# Patient Record
Sex: FEMALE | Race: WHITE | NOT HISPANIC OR LATINO | Employment: OTHER
[De-identification: names, ages, dates, MRNs, and addresses within clinical notes are randomized per-mention and may not be internally consistent; named-entity substitution may affect disease eponyms.]

---

## 2017-05-05 DIAGNOSIS — G25.81 RESTLESS LEG SYNDROME: ICD-10-CM

## 2017-05-05 RX ORDER — ROPINIROLE 1 MG/1
TABLET, FILM COATED ORAL
Qty: 135 TABLET | Refills: 1 | Status: SHIPPED | OUTPATIENT
Start: 2017-05-05 | End: 2017-12-08

## 2017-05-05 NOTE — TELEPHONE ENCOUNTER
Patient request for 90 day supply.      DX: was for restless legs.  Per protocol can give up to 1 year.  No diagnosis for parkinsons.           Last Office Visit with G, P or [facility] prescribing provider: 10/28/16        BP Readings from Last 3 Encounters:   10/28/16 126/68   10/23/15 96/69   04/29/15 125/72     Medication filled for 6 months per protocol.      Ibis Peter RN, [facility]

## 2017-05-26 ENCOUNTER — HEALTH MAINTENANCE LETTER (OUTPATIENT)
Age: 57
End: 2017-05-26

## 2017-06-05 ENCOUNTER — TELEPHONE (OUTPATIENT)
Dept: PEDIATRICS | Facility: CLINIC | Age: 57
End: 2017-06-05

## 2017-06-05 NOTE — TELEPHONE ENCOUNTER
[facility] Call Center    Phone Message    Name of Caller: RAZIA VILLA    Phone Number: Cell number on file:    Telephone Information:   Mobile [phone]       Best time to return call: TODAY.     May a detailed message be left on voicemail: yes    Relation to patient: Self    Reason for Call: Other: Pt needs refill.  Uses it for restless legs (not Parkinsons).   rOPINIRole (REQUIP) 1 MG tablet.  Pt upset that nurse thought she had Parkinsons.     Action Taken: Message routed to:  Primary Care p [phone]

## 2017-06-05 NOTE — TELEPHONE ENCOUNTER
Per 5/5/17 refill encounter:  5/5/17 11:23 AM   Note      Patient request for 90 day supply.       DX: was for restless legs.  Per protocol can give up to 1 year.  No diagnosis for parkinsons.              Last Office Visit with [facility], P or [facility] prescribing provider: 10/28/16              BP Readings from Last 3 Encounters:   10/28/16 126/68   10/23/15 96/69   04/29/15 125/72      Medication filled for 6 months per protocol.       Ibis Peter RN, [facility]            Last fill:   rOPINIRole (REQUIP) 1 MG tablet 135 tablet 1 5/5/2017  No     Sig: TAKE ONE-HALF TABLET BY MOUTH DURING THE DAY AND 1 TABLET AT BEDTIME AS DIRECTED     Class: E-Prescribe     Notes to Pharmacy: **Patient requests 90 days supply**     Order: [id number]     E-Prescribing Status: Receipt confirmed by pharmacy (5/5/2017 11:36 AM CDT)       Printout Tracking      External Result Report       Pharmacy      [facility] [address] [place] [address]           Patient states she spoke to the pharmacist and they dont have the RX above.  Called [facility] pharmacy. They never received this RX.   Gave them verbal order of above RX Requip.  Called patient to notify her verbal order given to pharmacy.    Ibis Peter RN,   [facility]

## 2017-06-13 ENCOUNTER — TELEPHONE (OUTPATIENT)
Dept: PEDIATRICS | Facility: CLINIC | Age: 57
End: 2017-06-13

## 2017-06-13 DIAGNOSIS — Z11.1 SCREENING EXAMINATION FOR PULMONARY TUBERCULOSIS: Primary | ICD-10-CM

## 2017-06-13 DIAGNOSIS — Z11.1 SCREENING EXAMINATION FOR PULMONARY TUBERCULOSIS: ICD-10-CM

## 2017-06-13 PROCEDURE — 36415 COLL VENOUS BLD VENIPUNCTURE: CPT | Performed by: FAMILY MEDICINE

## 2017-06-13 PROCEDURE — 86480 TB TEST CELL IMMUN MEASURE: CPT | Performed by: FAMILY MEDICINE

## 2017-06-13 NOTE — TELEPHONE ENCOUNTER
[facility] Call Center    Phone Message    Name of Caller: DAMIAN VILLA    Phone Number: Cell number on file:    Telephone Information:   Mobile [phone]       Best time to return call: Today, when an order is placed for lab    May a detailed message be left on voicemail: yes    Relation to patient: Self    Reason for Call: Other: Pt states her employeer is asking for a blood mantoux test. They cannot accept the skin mantoux test. Please ask Dr Dyer for order and then call patient when she can come in. Thanks.      Action Taken: Message routed to:  Primary Care p [phone]

## 2017-06-13 NOTE — TELEPHONE ENCOUNTER
Routing encounter to PCP for review and lab order. Patient's employer is requesting a tuberculosis quantiferon. They do not accept mantoux placement.  Marie Kaur CMA

## 2017-06-13 NOTE — TELEPHONE ENCOUNTER
Notified patient that lab order has been placed. Patient scheduled a lab only appointment for today at 2:20 PM.  Marie Kaur CMA

## 2017-06-15 ENCOUNTER — TELEPHONE (OUTPATIENT)
Dept: PEDIATRICS | Facility: CLINIC | Age: 57
End: 2017-06-15

## 2017-06-15 LAB
M TB TUBERC IFN-G BLD QL: NEGATIVE
M TB TUBERC IFN-G/MITOGEN IGNF BLD: 0.17 IU/ML

## 2017-06-15 NOTE — LETTER
June 16, 2017      Zari Francisco  [address]  [place] [address]              Dear Zari,    Your Tuberculosis screen lab test was negative.    Component      Latest Ref Rng & Units 6/13/2017   M Tuberculosis Result      NEG Negative   M Tuberculosis Antigen Value      IU/mL 0.17         Sincerely,      Susanna Dyer MD

## 2017-06-15 NOTE — TELEPHONE ENCOUNTER
Notes Recorded by Susanna Dyer MD on 6/15/2017 at 1:47 PM  Please inform patient of negative TB screen test results

## 2017-06-16 NOTE — TELEPHONE ENCOUNTER
Patient returned call.  Gave her lab results.  She would like a copy of her lab results mailed to her.    Ibis Peter RN,   [facility], [facility]

## 2017-06-16 NOTE — TELEPHONE ENCOUNTER
2nd attempt:    Left message for patient to return call to clinic and ask to speak with nurse.    Ibis Peter RN,   [facility]

## 2017-12-08 DIAGNOSIS — G25.81 RESTLESS LEG SYNDROME: ICD-10-CM

## 2017-12-08 NOTE — LETTER
December 11, 2017      Zari Francisco  [address] [place] [address]              Dear Zari,      We recently received a call from your pharmacy requesting a refill of your medication. We have provided a 30 day refill of your medication, rOPINIRole (REQUIP) 1 MG tablet, as requested.      A review of your chart indicates that your last office visit was on 10/28/2016.  An appointment is required yearly with your provider.      We have authorized one time 30 day refill of your medication to allow time for you to schedule.     If you have a history of diabetes or high cholesterol, please come in fasting for the appointment. Fasting entails nothing to eat or drink 8 hours prior to your appointment; with the exception on water. You may take your medication the day of the appointment.    Please call the clinic to schedule your appointment.    Thank you for taking an active role in your healthcare.    Sincerely,       [facility]  Susanna Dyer MD

## 2017-12-11 RX ORDER — ROPINIROLE 1 MG/1
TABLET, FILM COATED ORAL
Qty: 45 TABLET | Refills: 0 | Status: SHIPPED | OUTPATIENT
Start: 2017-12-11 | End: 2018-01-02

## 2017-12-11 NOTE — TELEPHONE ENCOUNTER
rOPINIRole (REQUIP) 1 MG tablet  Last Written Prescription Date: 5/5/2017  Last Fill Quantity: 135, # refills: 1  Last Office Visit with [facility], [facility] or [facility] prescribing provider: 10/28/2016        BP Readings from Last 3 Encounters:   10/28/16 126/68   10/23/15 96/69   04/29/15 125/72     Lila refill. Letter sent to patient to schedule annual office visit. Jenny Wilks RN

## 2017-12-12 DIAGNOSIS — G25.81 RESTLESS LEG SYNDROME: ICD-10-CM

## 2017-12-13 RX ORDER — ROPINIROLE 1 MG/1
TABLET, FILM COATED ORAL
Qty: 135 TABLET | Refills: 0 | OUTPATIENT
Start: 2017-12-13

## 2018-01-02 ENCOUNTER — MYC MEDICAL ADVICE (OUTPATIENT)
Dept: PEDIATRICS | Facility: CLINIC | Age: 58
End: 2018-01-02

## 2018-01-02 DIAGNOSIS — G25.81 RESTLESS LEG SYNDROME: ICD-10-CM

## 2018-01-02 DIAGNOSIS — Z13.6 CARDIOVASCULAR SCREENING; LDL GOAL LESS THAN 160: Primary | ICD-10-CM

## 2018-01-02 RX ORDER — ROPINIROLE 1 MG/1
TABLET, FILM COATED ORAL
Qty: 45 TABLET | Refills: 0 | Status: SHIPPED | OUTPATIENT
Start: 2018-01-02 | End: 2018-02-09

## 2018-01-02 NOTE — TELEPHONE ENCOUNTER
**Second bernadette given to get patient through to appointment below**    rOPINIRole (REQUIP) 1 MG tablet  Last Written Prescription Date: 12/11/2017  Last Fill Quantity: 45, # refills: 0  Last Office Visit with FMSUKHWINDER, [facility] or [facility] prescribing provider: 10/28/2016   Next 5 appointments (look out 90 days)     Jan 16, 2018  7:50 AM CST   [facility] with Susanna Dyer MD   [facility] ([facility])    [address] [place] [address] [phone]                   BP Readings from Last 3 Encounters:   10/28/16 126/68   10/23/15 96/69   04/29/15 125/72       Jenny Wilks RN

## 2018-01-05 DIAGNOSIS — Z13.1 SCREENING FOR DIABETES MELLITUS (DM): ICD-10-CM

## 2018-01-05 DIAGNOSIS — G25.81 RESTLESS LEG SYNDROME: ICD-10-CM

## 2018-01-05 DIAGNOSIS — Z13.0 SCREENING FOR DEFICIENCY ANEMIA: ICD-10-CM

## 2018-01-05 DIAGNOSIS — Z13.6 CARDIOVASCULAR SCREENING; LDL GOAL LESS THAN 160: ICD-10-CM

## 2018-01-05 DIAGNOSIS — Z00.00 ROUTINE GENERAL MEDICAL EXAMINATION AT A HEALTH CARE FACILITY: Primary | ICD-10-CM

## 2018-01-11 ENCOUNTER — TELEPHONE (OUTPATIENT)
Dept: PEDIATRICS | Facility: CLINIC | Age: 58
End: 2018-01-11

## 2018-01-11 DIAGNOSIS — E55.9 VITAMIN D DEFICIENCY: ICD-10-CM

## 2018-01-11 DIAGNOSIS — G25.81 RESTLESS LEG SYNDROME: ICD-10-CM

## 2018-01-11 DIAGNOSIS — Z13.0 SCREENING FOR DEFICIENCY ANEMIA: ICD-10-CM

## 2018-01-11 DIAGNOSIS — Z00.00 ROUTINE GENERAL MEDICAL EXAMINATION AT A HEALTH CARE FACILITY: ICD-10-CM

## 2018-01-11 DIAGNOSIS — Z13.6 CARDIOVASCULAR SCREENING; LDL GOAL LESS THAN 160: ICD-10-CM

## 2018-01-11 DIAGNOSIS — E55.9 VITAMIN D DEFICIENCY: Primary | ICD-10-CM

## 2018-01-11 DIAGNOSIS — Z13.1 SCREENING FOR DIABETES MELLITUS (DM): ICD-10-CM

## 2018-01-11 LAB
ALBUMIN SERPL-MCNC: 4.1 G/DL (ref 3.4–5)
ALP SERPL-CCNC: 79 U/L (ref 40–150)
ALT SERPL W P-5'-P-CCNC: 19 U/L (ref 0–50)
ANION GAP SERPL CALCULATED.3IONS-SCNC: 7 MMOL/L (ref 3–14)
AST SERPL W P-5'-P-CCNC: 13 U/L (ref 0–45)
BASOPHILS # BLD AUTO: 0.1 10E9/L (ref 0–0.2)
BASOPHILS NFR BLD AUTO: 1 %
BILIRUB SERPL-MCNC: 0.4 MG/DL (ref 0.2–1.3)
BUN SERPL-MCNC: 16 MG/DL (ref 7–30)
CALCIUM SERPL-MCNC: 8.9 MG/DL (ref 8.5–10.1)
CHLORIDE SERPL-SCNC: 106 MMOL/L (ref 94–109)
CHOLEST SERPL-MCNC: 218 MG/DL
CO2 SERPL-SCNC: 27 MMOL/L (ref 20–32)
CREAT SERPL-MCNC: 0.74 MG/DL (ref 0.52–1.04)
DIFFERENTIAL METHOD BLD: NORMAL
EOSINOPHIL # BLD AUTO: 0.5 10E9/L (ref 0–0.7)
EOSINOPHIL NFR BLD AUTO: 6.8 %
ERYTHROCYTE [DISTWIDTH] IN BLOOD BY AUTOMATED COUNT: 11.2 % (ref 10–15)
GFR SERPL CREATININE-BSD FRML MDRD: 80 ML/MIN/1.7M2
GLUCOSE SERPL-MCNC: 88 MG/DL (ref 70–99)
HCT VFR BLD AUTO: 42 % (ref 35–47)
HDLC SERPL-MCNC: 78 MG/DL
HGB BLD-MCNC: 13.6 G/DL (ref 11.7–15.7)
IMM GRANULOCYTES # BLD: 0 10E9/L (ref 0–0.4)
IMM GRANULOCYTES NFR BLD: 0.3 %
LDLC SERPL CALC-MCNC: 127 MG/DL
LYMPHOCYTES # BLD AUTO: 2.1 10E9/L (ref 0.8–5.3)
LYMPHOCYTES NFR BLD AUTO: 30 %
MCH RBC QN AUTO: 29.3 PG (ref 26.5–33)
MCHC RBC AUTO-ENTMCNC: 32.4 G/DL (ref 31.5–36.5)
MCV RBC AUTO: 91 FL (ref 78–100)
MONOCYTES # BLD AUTO: 0.5 10E9/L (ref 0–1.3)
MONOCYTES NFR BLD AUTO: 7.1 %
NEUTROPHILS # BLD AUTO: 3.9 10E9/L (ref 1.6–8.3)
NEUTROPHILS NFR BLD AUTO: 54.8 %
NONHDLC SERPL-MCNC: 140 MG/DL
PLATELET # BLD AUTO: 272 10E9/L (ref 150–450)
POTASSIUM SERPL-SCNC: 3.9 MMOL/L (ref 3.4–5.3)
PROT SERPL-MCNC: 7.4 G/DL (ref 6.8–8.8)
RBC # BLD AUTO: 4.64 10E12/L (ref 3.8–5.2)
SODIUM SERPL-SCNC: 140 MMOL/L (ref 133–144)
TRIGL SERPL-MCNC: 65 MG/DL
WBC # BLD AUTO: 7.1 10E9/L (ref 4–11)

## 2018-01-11 PROCEDURE — 85025 COMPLETE CBC W/AUTO DIFF WBC: CPT | Performed by: FAMILY MEDICINE

## 2018-01-11 PROCEDURE — 80061 LIPID PANEL: CPT | Performed by: FAMILY MEDICINE

## 2018-01-11 PROCEDURE — 82306 VITAMIN D 25 HYDROXY: CPT | Performed by: FAMILY MEDICINE

## 2018-01-11 PROCEDURE — 80053 COMPREHEN METABOLIC PANEL: CPT | Performed by: FAMILY MEDICINE

## 2018-01-11 PROCEDURE — 36415 COLL VENOUS BLD VENIPUNCTURE: CPT | Performed by: FAMILY MEDICINE

## 2018-01-11 NOTE — TELEPHONE ENCOUNTER
----- Message from Anne Malave sent at 1/11/2018  8:05 AM CST -----  Regarding: lab  Patient is requesting a Vitamin D level check. Lab can add this onto the previous draw if ordered. Please order lab if appropriate. Thanks, Anne VALENCIA

## 2018-01-11 NOTE — TELEPHONE ENCOUNTER
Called lab and spoke with Mahogany. Mahogany states she will add on Vitamin D level as requested.  Marie Kaur, CMA

## 2018-01-12 LAB — DEPRECATED CALCIDIOL+CALCIFEROL SERPL-MC: 36 UG/L (ref 20–75)

## 2018-01-15 DIAGNOSIS — G25.81 RESTLESS LEG SYNDROME: ICD-10-CM

## 2018-01-16 RX ORDER — ROPINIROLE 1 MG/1
TABLET, FILM COATED ORAL
Qty: 45 TABLET | Refills: 0 | OUTPATIENT
Start: 2018-01-16

## 2018-01-16 NOTE — TELEPHONE ENCOUNTER
rOPINIRole (REQUIP) 1 MG tablet  Last Written Prescription Date:  1/2/2018  Last Fill Quantity: 45,  # refills: 0   Last Office Visit with [facility], P or [facility] prescribing provider:  10/28/2016   Future Office Visit:         Antiparkinson's Agents Protocol Failed1/15 5:40 PM   Blood pressure under 140/90    Recent or future visit with authorizing provider's specialty    Patient is age 18 or older    No active pregnancy on record    No positive pregnancy test in the past 12 months     Patient canceled her 1/16/2018 appointment due to illness. The above Rx was for a 3 month refill. Patient has not rescheduled. Refill request too soon. Jenny Wilks RN

## 2018-01-30 NOTE — PROGRESS NOTES
Normal vitamin D, fasting blood sugar, liver and kidney functions, hemoglobin and blood counts, stable fasting cholesterol numbers.  These are good and reassuring.  Susanna Dyer MD

## 2018-02-09 ENCOUNTER — MYC MEDICAL ADVICE (OUTPATIENT)
Dept: PEDIATRICS | Facility: CLINIC | Age: 58
End: 2018-02-09

## 2018-02-09 DIAGNOSIS — G25.81 RESTLESS LEG SYNDROME: ICD-10-CM

## 2018-02-09 RX ORDER — ROPINIROLE 1 MG/1
TABLET, FILM COATED ORAL
Qty: 8 TABLET | Refills: 0 | Status: SHIPPED | OUTPATIENT
Start: 2018-02-09 | End: 2018-02-12

## 2018-02-12 ENCOUNTER — OFFICE VISIT (OUTPATIENT)
Dept: PEDIATRICS | Facility: CLINIC | Age: 58
End: 2018-02-12
Payer: COMMERCIAL

## 2018-02-12 VITALS
OXYGEN SATURATION: 100 % | DIASTOLIC BLOOD PRESSURE: 70 MMHG | TEMPERATURE: 97.2 F | WEIGHT: 140.4 LBS | SYSTOLIC BLOOD PRESSURE: 136 MMHG | HEIGHT: 66 IN | BODY MASS INDEX: 22.56 KG/M2 | HEART RATE: 80 BPM

## 2018-02-12 DIAGNOSIS — Z12.39 BREAST CANCER SCREENING: ICD-10-CM

## 2018-02-12 DIAGNOSIS — Z00.00 ROUTINE GENERAL MEDICAL EXAMINATION AT A HEALTH CARE FACILITY: Primary | ICD-10-CM

## 2018-02-12 DIAGNOSIS — R91.8 PULMONARY NODULES: ICD-10-CM

## 2018-02-12 DIAGNOSIS — G25.81 RESTLESS LEG SYNDROME: ICD-10-CM

## 2018-02-12 DIAGNOSIS — N95.2 ATROPHIC VAGINITIS: ICD-10-CM

## 2018-02-12 DIAGNOSIS — F41.1 GENERALIZED ANXIETY DISORDER: ICD-10-CM

## 2018-02-12 DIAGNOSIS — G47.00 PERSISTENT DISORDER OF INITIATING OR MAINTAINING SLEEP: ICD-10-CM

## 2018-02-12 PROBLEM — Z72.0 OCCASIONAL TOBACCO SMOKER: Status: ACTIVE | Noted: 2018-02-12

## 2018-02-12 PROCEDURE — 99396 PREV VISIT EST AGE 40-64: CPT | Performed by: FAMILY MEDICINE

## 2018-02-12 RX ORDER — TRAZODONE HYDROCHLORIDE 50 MG/1
TABLET, FILM COATED ORAL
Qty: 135 TABLET | Refills: 3 | Status: SHIPPED | OUTPATIENT
Start: 2018-02-12 | End: 2020-05-13

## 2018-02-12 RX ORDER — ROPINIROLE 1 MG/1
TABLET, FILM COATED ORAL
Qty: 135 TABLET | Refills: 3 | Status: SHIPPED | OUTPATIENT
Start: 2018-02-12 | End: 2019-01-12

## 2018-02-12 RX ORDER — ROPINIROLE 1 MG/1
TABLET, FILM COATED ORAL
Qty: 8 TABLET | Refills: 0 | Status: SHIPPED | OUTPATIENT
Start: 2018-02-12 | End: 2018-02-12

## 2018-02-12 ASSESSMENT — PAIN SCALES - GENERAL: PAINLEVEL: NO PAIN (0)

## 2018-02-12 NOTE — MR AVS SNAPSHOT
After Visit Summary   2/12/2018    Zari Francisco    MRN: [id number]           Patient Information     Date Of Birth          1960        Visit Information        Provider Department      2/12/2018 2:50 PM Susanna Dyer MD [facility]        Today's Diagnoses     Routine general medical examination at a health care facility    -  1    Restless leg syndrome        Breast cancer screening        Persistent disorder of initiating or maintaining sleep        Atrophic vaginitis        Pulmonary nodules          Care Instructions    Schedule for chest CT and mammogram      Preventive Health Recommendations  Female Ages 50 - 64    Yearly exam: See your health care provider every year in order to  o Review health changes.   o Discuss preventive care.    o Review your medicines if your doctor has prescribed any.      Get a Pap test every three years (unless you have an abnormal result and your provider advises testing more often).    If you get Pap tests with HPV test, you only need to test every 5 years, unless you have an abnormal result.     You do not need a Pap test if your uterus was removed (hysterectomy) and you have not had cancer.    You should be tested each year for STDs (sexually transmitted diseases) if you're at risk.     Have a mammogram every 1 to 2 years.    Have a colonoscopy at age 50, or have a yearly FIT test (stool test). These exams screen for colon cancer.      Have a cholesterol test every 5 years, or more often if advised.    Have a diabetes test (fasting glucose) every three years. If you are at risk for diabetes, you should have this test more often.     If you are at risk for osteoporosis (brittle bone disease), think about having a bone density scan (DEXA).    Shots: Get a flu shot each year. Get a tetanus shot every 10 years.    Nutrition:     Eat at least 5 servings of fruits and vegetables each day.    Eat whole-grain bread, whole-wheat pasta and brown  rice instead of white grains and rice.    Talk to your provider about Calcium and Vitamin D.     Lifestyle    Exercise at least 150 minutes a week (30 minutes a day, 5 days a week). This will help you control your weight and prevent disease.    Limit alcohol to one drink per day.    No smoking.     Wear sunscreen to prevent skin cancer.     See your dentist every six months for an exam and cleaning.    See your eye doctor every 1 to 2 years.            Follow-ups after your visit        Future tests that were ordered for you today     Open Future Orders        Priority Expected Expires Ordered    CT Chest w/o Contrast Routine  2/12/2019 2/12/2018    MA Screening Digital Bilateral Routine  2/12/2019 2/12/2018            Who to contact     If you have questions or need follow up information about today's clinic visit or your schedule please contact [facility] directly at [phone].  Normal or non-critical lab and imaging results will be communicated to you by [employer], letter or phone within 4 business days after the clinic has received the results. If you do not hear from us within 7 days, please contact the clinic through [employer] or phone. If you have a critical or abnormal lab result, we will notify you by phone as soon as possible.  Submit refill requests through [facility] or call your pharmacy and they will forward the refill request to us. Please allow 3 business days for your refill to be completed.          Additional Information About Your Visit        [employer] Information     [facility] gives you secure access to your electronic health record. If you see a primary care provider, you can also send messages to your care team and make appointments. If you have questions, please call your primary care clinic.  If you do not have a primary care provider, please call [phone] and they will assist you.      [facility] is an electronic gateway that provides easy, online access to your medical records.  "With MyChart, you can request a clinic appointment, read your test results, renew a prescription or communicate with your care team.     To access your existing account, please contact your [facility] or call [phone] for assistance.        Care EveryWhere ID     This is your Care EveryWhere ID. This could be used by other organizations to access your [facility] medical records  [id number]        Your Vitals Were     Pulse Temperature Height Pulse Oximetry BMI (Body Mass Index)       80 97.2  F (36.2  C) (Oral) 5' 5.5\" (1.664 m) 100% 23.01 kg/m2        Blood Pressure from Last 3 Encounters:   02/12/18 136/70   10/28/16 126/68   10/23/15 96/69    Weight from Last 3 Encounters:   02/12/18 140 lb 6.4 oz (63.7 kg)   10/28/16 136 lb 12.8 oz (62.1 kg)   10/23/15 136 lb 14.4 oz (62.1 kg)                 Today's Medication Changes          These changes are accurate as of 2/12/18  3:20 PM.  If you have any questions, ask your nurse or doctor.               These medicines have changed or have updated prescriptions.        Dose/Directions    conjugated estrogens cream   Commonly known as:  PREMARIN   This may have changed:  See the new instructions.   Used for:  Atrophic vaginitis   Changed by:  Susanna Dyer MD        INSERT 0.5 GRAMS INTRAVAGINALLY TWICE WEEKLY AS DIRECTED   Quantity:  30 g   Refills:  3         Stop taking these medicines if you haven't already. Please contact your care team if you have questions.     estradiol 0.05 MG/24HR BIW patch   Commonly known as:  VIVELLE-DOT   Stopped by:  Susanna Dyer MD           mometasone 50 MCG/ACT spray   Commonly known as:  NASONEX   Stopped by:  Susanna Dyer MD                Where to get your medicines      These medications were sent to [facility] [address] [facility] [address] [place] [address]  [address] [address] [address]     Phone:  [phone]     conjugated estrogens cream    " rOPINIRole 1 MG tablet    traZODone 50 MG tablet                Primary Care Provider Office Phone # Fax #    Susanna Dyer [phone] [address] [facility] [address]        Equal Access to Services     CHEYENNELINDA SERA : Hadii aad ku hadtoroo Soomaali, waaxda luqadaha, qaybta kaalmada adeegyada, waxay idiin hayloryn adegoyo mercado kerline fiore. So [facility] [phone].    ATENCIÓN: Si habla español, tiene a holbrook disposición servicios gratuitos de asistencia lingüística. Llame al [phone].    We comply with applicable federal civil rights laws and [place] laws. We do not discriminate on the basis of race, color, national origin, age, disability, sex, sexual orientation, or gender identity.            Thank you!     Thank you for choosing [facility]  for your care. Our goal is always to provide you with excellent care. Hearing back from our patients is one way we can continue to improve our services. Please take a few minutes to complete the written survey that you may receive in the mail after your visit with us. Thank you!             Your Updated Medication List - Protect others around you: Learn how to safely use, store and throw away your medicines at www.disposemymeds.org.          This list is accurate as of 2/12/18  3:20 PM.  Always use your most recent med list.                   Brand Name Dispense Instructions for use Diagnosis    albuterol 108 (90 BASE) MCG/ACT Inhaler    VENTOLIN HFA    18 g    INHALE 2 PUFFS BY MOUTH INTO THE LUNGS EVERY 4 HOURS AS NEEDED FOR SHORTNESS OF BREATH/ DYSPNEA    Bronchospasm       conjugated estrogens cream    PREMARIN    30 g    INSERT 0.5 GRAMS INTRAVAGINALLY TWICE WEEKLY AS DIRECTED    Atrophic vaginitis       DULoxetine 30 MG EC capsule    CYMBALTA    360 capsule    Take 2 capsules (60 mg) by mouth 2 times daily    Menopausal syndrome (hot flashes), Generalized anxiety disorder       fluticasone 50 MCG/ACT spray    FLONASE    1  Package    Spray 2 sprays into both nostrils daily    Atrophic vaginitis       lamoTRIgine 25 MG tablet    LaMICtal     Take 1.5 tablets by mouth daily        polyethylene glycol powder    MIRALAX    510 g    Take 17 g by mouth daily.    Chronic constipation       rOPINIRole 1 MG tablet    REQUIP    8 tablet    TAKE 1/2 TABLET BY MOUTH DURING THE DAY AND 1 TABLET AT BEDTIME AS DIRECTED    Restless leg syndrome       traZODone 50 MG tablet    DESYREL    135 tablet    TAKE ONE AND ONE-HALF TABLETS BY MOUTH DAILY AS DIRECTED AS NEEDED FOR SLEEP    Persistent disorder of initiating or maintaining sleep

## 2018-02-12 NOTE — NURSING NOTE
"Chief Complaint   Patient presents with     Physical       Initial /70 (BP Location: Right arm, Patient Position: Sitting, Cuff Size: Adult Regular)  Pulse 80  Temp 97.2  F (36.2  C) (Oral)  Ht 5' 5.5\" (1.664 m)  Wt 140 lb 6.4 oz (63.7 kg)  SpO2 100%  BMI 23.01 kg/m2 Estimated body mass index is 23.01 kg/(m^2) as calculated from the following:    Height as of this encounter: 5' 5.5\" (1.664 m).    Weight as of this encounter: 140 lb 6.4 oz (63.7 kg).  Medication Reconciliation: complete  Nhung Avitia CMA    "
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
## 2018-02-12 NOTE — PATIENT INSTRUCTIONS
Schedule for chest CT and mammogram      Preventive Health Recommendations  Female Ages 50 - 64    Yearly exam: See your health care provider every year in order to  o Review health changes.   o Discuss preventive care.    o Review your medicines if your doctor has prescribed any.      Get a Pap test every three years (unless you have an abnormal result and your provider advises testing more often).    If you get Pap tests with HPV test, you only need to test every 5 years, unless you have an abnormal result.     You do not need a Pap test if your uterus was removed (hysterectomy) and you have not had cancer.    You should be tested each year for STDs (sexually transmitted diseases) if you're at risk.     Have a mammogram every 1 to 2 years.    Have a colonoscopy at age 50, or have a yearly FIT test (stool test). These exams screen for colon cancer.      Have a cholesterol test every 5 years, or more often if advised.    Have a diabetes test (fasting glucose) every three years. If you are at risk for diabetes, you should have this test more often.     If you are at risk for osteoporosis (brittle bone disease), think about having a bone density scan (DEXA).    Shots: Get a flu shot each year. Get a tetanus shot every 10 years.    Nutrition:     Eat at least 5 servings of fruits and vegetables each day.    Eat whole-grain bread, whole-wheat pasta and brown rice instead of white grains and rice.    Talk to your provider about Calcium and Vitamin D.     Lifestyle    Exercise at least 150 minutes a week (30 minutes a day, 5 days a week). This will help you control your weight and prevent disease.    Limit alcohol to one drink per day.    No smoking.     Wear sunscreen to prevent skin cancer.     See your dentist every six months for an exam and cleaning.    See your eye doctor every 1 to 2 years.

## 2018-02-19 ENCOUNTER — TELEPHONE (OUTPATIENT)
Dept: NURSING | Facility: CLINIC | Age: 58
End: 2018-02-19

## 2018-02-19 NOTE — TELEPHONE ENCOUNTER
Patient is calling about a medication she was started on by Isidros. She thought she was calling her PCP Dr Dyer at [place].     Patient states that for 3 days she had no issues with the adderall 10 mg BID that she was started on. She is taking one at 6 am then a second at 1:20 pm. She states that the second one is causing her to have the jitters and some shaking. Today she is noting a racing heart and shaking.    Patient is advised to be seen in Urgent Care for assessment of the symptoms. She is informed of where Urgent Care are located.     She is also informed to call and inform Nystroms of these symptoms.    Vanessa Cordero RN, [facility] Triage

## 2018-03-02 ENCOUNTER — RADIANT APPOINTMENT (OUTPATIENT)
Dept: MAMMOGRAPHY | Facility: CLINIC | Age: 58
End: 2018-03-02
Attending: FAMILY MEDICINE
Payer: COMMERCIAL

## 2018-03-02 ENCOUNTER — RADIANT APPOINTMENT (OUTPATIENT)
Dept: CT IMAGING | Facility: CLINIC | Age: 58
End: 2018-03-02
Attending: FAMILY MEDICINE
Payer: COMMERCIAL

## 2018-03-02 DIAGNOSIS — R91.8 PULMONARY NODULES: ICD-10-CM

## 2018-03-02 DIAGNOSIS — Z00.00 ROUTINE GENERAL MEDICAL EXAMINATION AT A HEALTH CARE FACILITY: ICD-10-CM

## 2018-03-02 DIAGNOSIS — Z12.39 BREAST CANCER SCREENING: ICD-10-CM

## 2018-03-02 PROCEDURE — 71250 CT THORAX DX C-: CPT | Performed by: RADIOLOGY

## 2018-03-02 PROCEDURE — 77067 SCR MAMMO BI INCL CAD: CPT

## 2018-03-02 PROCEDURE — 77063 BREAST TOMOSYNTHESIS BI: CPT

## 2018-03-05 NOTE — PROGRESS NOTES
Hi Maddie,  The CT showed stable and unchanged lung nodules form 3 years ago, this is reassuring.   Let me know if you have any questions. Take care.  Susanna Dyer MD

## 2018-10-22 ENCOUNTER — OFFICE VISIT (OUTPATIENT)
Dept: PEDIATRICS | Facility: CLINIC | Age: 58
End: 2018-10-22
Payer: COMMERCIAL

## 2018-10-22 VITALS
DIASTOLIC BLOOD PRESSURE: 80 MMHG | HEART RATE: 91 BPM | TEMPERATURE: 98 F | WEIGHT: 137 LBS | SYSTOLIC BLOOD PRESSURE: 126 MMHG | OXYGEN SATURATION: 95 % | BODY MASS INDEX: 22.45 KG/M2

## 2018-10-22 DIAGNOSIS — N95.2 ATROPHIC VAGINITIS: ICD-10-CM

## 2018-10-22 DIAGNOSIS — G25.81 RESTLESS LEG SYNDROME: Primary | ICD-10-CM

## 2018-10-22 DIAGNOSIS — N89.8 VAGINAL ITCHING: ICD-10-CM

## 2018-10-22 LAB
FERRITIN SERPL-MCNC: 29 NG/ML (ref 8–252)
MAGNESIUM SERPL-MCNC: 2.3 MG/DL (ref 1.6–2.3)
SPECIMEN SOURCE: NORMAL
TSH SERPL DL<=0.005 MIU/L-ACNC: 2.38 MU/L (ref 0.4–4)
WET PREP SPEC: NORMAL

## 2018-10-22 PROCEDURE — 36415 COLL VENOUS BLD VENIPUNCTURE: CPT | Performed by: FAMILY MEDICINE

## 2018-10-22 PROCEDURE — 99214 OFFICE O/P EST MOD 30 MIN: CPT | Performed by: FAMILY MEDICINE

## 2018-10-22 PROCEDURE — 82728 ASSAY OF FERRITIN: CPT | Performed by: FAMILY MEDICINE

## 2018-10-22 PROCEDURE — 87210 SMEAR WET MOUNT SALINE/INK: CPT | Performed by: FAMILY MEDICINE

## 2018-10-22 PROCEDURE — 83735 ASSAY OF MAGNESIUM: CPT | Performed by: FAMILY MEDICINE

## 2018-10-22 PROCEDURE — 84443 ASSAY THYROID STIM HORMONE: CPT | Performed by: FAMILY MEDICINE

## 2018-10-22 RX ORDER — GABAPENTIN 100 MG/1
100 CAPSULE ORAL AT BEDTIME
Qty: 90 CAPSULE | Refills: 3 | Status: SHIPPED | OUTPATIENT
Start: 2018-10-22 | End: 2019-05-06 | Stop reason: DRUGHIGH

## 2018-10-22 RX ORDER — DEXTROAMPHETAMINE SACCHARATE, AMPHETAMINE ASPARTATE, DEXTROAMPHETAMINE SULFATE AND AMPHETAMINE SULFATE 2.5; 2.5; 2.5; 2.5 MG/1; MG/1; MG/1; MG/1
10 TABLET ORAL DAILY
Refills: 0 | COMMUNITY
Start: 2018-10-08 | End: 2022-01-17

## 2018-10-22 NOTE — MR AVS SNAPSHOT
After Visit Summary   10/22/2018    Zari Francisco    MRN: [id number]           Patient Information     Date Of Birth          1960        Visit Information        Provider Department      10/22/2018 2:30 PM Susanna Dyer MD [facility]        Today's Diagnoses     Restless leg syndrome    -  1    Atrophic vaginitis        Vaginal itching          Care Instructions    Get the labs today  Start on GABAPENTIN 100mg at bedtime            Follow-ups after your visit        Who to contact     If you have questions or need follow up information about today's clinic visit or your schedule please contact [facility] directly at [phone].  Normal or non-critical lab and imaging results will be communicated to you by MyChart, letter or phone within 4 business days after the clinic has received the results. If you do not hear from us within 7 days, please contact the clinic through Extraprise[employer] or phone. If you have a critical or abnormal lab result, we will notify you by phone as soon as possible.  Submit refill requests through [employer] or call your pharmacy and they will forward the refill request to us. Please allow 3 business days for your refill to be completed.          Additional Information About Your Visit        MyChart Information     VertiFlex gives you secure access to your electronic health record. If you see a primary care provider, you can also send messages to your care team and make appointments. If you have questions, please call your primary care clinic.  If you do not have a primary care provider, please call [phone] and they will assist you.      VertiFlex is an electronic gateway that provides easy, online access to your medical records. With [employer], you can request a clinic appointment, read your test results, renew a prescription or communicate with your care team.     To access your existing account, please contact your [facility] or call [phone] for assistance.        Care EveryWhere ID     This is your Care EveryWhere ID. This could be used by other organizations to access your [facility] medical records  [id number]        Your Vitals Were     Pulse Temperature Pulse Oximetry BMI (Body Mass Index)          91 98  F (36.7  C) (Oral) 95% 22.45 kg/m2         Blood Pressure from Last 3 Encounters:   10/22/18 126/80   02/12/18 136/70   10/28/16 126/68    Weight from Last 3 Encounters:   10/22/18 137 lb (62.1 kg)   02/12/18 140 lb 6.4 oz (63.7 kg)   10/28/16 136 lb 12.8 oz (62.1 kg)              We Performed the Following     Ferritin     Magnesium     TSH with free T4 reflex     Wet prep          Today's Medication Changes          These changes are accurate as of 10/22/18  2:59 PM.  If you have any questions, ask your nurse or doctor.               Start taking these medicines.        Dose/Directions    gabapentin 100 MG capsule   Commonly known as:  NEURONTIN   Used for:  Restless leg syndrome   Started by:  Susanna Dyer MD        Dose:  100 mg   Take 1 capsule (100 mg) by mouth At Bedtime   Quantity:  90 capsule   Refills:  3         These medicines have changed or have updated prescriptions.        Dose/Directions    DULoxetine 30 MG EC capsule   Commonly known as:  CYMBALTA   This may have changed:    - how much to take  - how to take this  - when to take this  - additional instructions   Used for:  Menopausal syndrome (hot flashes), Generalized anxiety disorder        Dose:  60 mg   Take 2 capsules (60 mg) by mouth 2 times daily   Quantity:  360 capsule   Refills:  1            Where to get your medicines      These medications were sent to [facility] [address] [facility] [address] [address] [address]     Phone:  [phone]     conjugated estrogens cream    gabapentin 100 MG capsule                Primary Care Provider Office Phone # Fax #    Susanna HERMAN  MD Manisha [phone]       [address]  [facility] [address]        Equal Access to Services     BUFFY ZAMORA : Hadii aad ku hadtorolu Stover, mulufariha marinelliopalha, fernandosimona pittsluisa carr, hasmukh omer alexandergoyo mercado laRamykristina fiore. So [facility] [phone].    ATENCIÓN: Si habla español, tiene a holbrook disposición servicios gratuitos de asistencia lingüística. Llame al [phone].    We comply with applicable federal civil rights laws and [place] laws. We do not discriminate on the basis of race, color, national origin, age, disability, sex, sexual orientation, or gender identity.            Thank you!     Thank you for choosing [facility]  for your care. Our goal is always to provide you with excellent care. Hearing back from our patients is one way we can continue to improve our services. Please take a few minutes to complete the written survey that you may receive in the mail after your visit with us. Thank you!             Your Updated Medication List - Protect others around you: Learn how to safely use, store and throw away your medicines at www.disposemymeds.org.          This list is accurate as of 10/22/18  2:59 PM.  Always use your most recent med list.                   Brand Name Dispense Instructions for use Diagnosis    albuterol 108 (90 Base) MCG/ACT inhaler    VENTOLIN HFA    18 g    INHALE 2 PUFFS BY MOUTH INTO THE LUNGS EVERY 4 HOURS AS NEEDED FOR SHORTNESS OF BREATH/ DYSPNEA    Bronchospasm       amphetamine-dextroamphetamine 10 MG per tablet    ADDERALL     TK ONE AND ONE-HALF TS PO IN THE MORNING AND ONE-HALF T PO IN THE AFTERNOON DAILY.        conjugated estrogens cream    PREMARIN    30 g    INSERT 0.5 GRAMS INTRAVAGINALLY TWICE WEEKLY AS DIRECTED    Atrophic vaginitis       DULoxetine 30 MG EC capsule    CYMBALTA    360 capsule    Take 2 capsules (60 mg) by mouth 2 times daily    Menopausal syndrome (hot flashes), Generalized anxiety disorder       fluticasone  50 MCG/ACT spray    FLONASE    1 Package    Spray 2 sprays into both nostrils daily    Atrophic vaginitis       gabapentin 100 MG capsule    NEURONTIN    90 capsule    Take 1 capsule (100 mg) by mouth At Bedtime    Restless leg syndrome       lamoTRIgine 25 MG tablet    LaMICtal     Take 50 tablets by mouth daily        polyethylene glycol powder    MIRALAX    510 g    Take 17 g by mouth daily.    Chronic constipation       rOPINIRole 1 MG tablet    REQUIP    135 tablet    TAKE 1/2 TABLET BY MOUTH DURING THE DAY AND 1 TABLET AT BEDTIME AS DIRECTED    Restless leg syndrome       traZODone 50 MG tablet    DESYREL    135 tablet    TAKE ONE AND ONE-HALF TABLETS BY MOUTH DAILY AS DIRECTED AS NEEDED FOR SLEEP    Persistent disorder of initiating or maintaining sleep
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
## 2018-12-03 DIAGNOSIS — G25.81 RESTLESS LEG SYNDROME: ICD-10-CM

## 2018-12-04 RX ORDER — ROPINIROLE 1 MG/1
TABLET, FILM COATED ORAL
Qty: 135 TABLET | Refills: 0 | OUTPATIENT
Start: 2018-12-04

## 2018-12-04 NOTE — TELEPHONE ENCOUNTER
rOPINIRole (REQUIP) 1 MG tablet 135 tablet 3 2/12/2018  No   Sig: TAKE 1/2 TABLET BY MOUTH DURING THE DAY AND 1 TABLET AT BEDTIME AS DIRECTED       Antiparkinson's Agents Protocol [id number] 4:09 PM   Blood pressure under 140/90 in past 12 months    CBC on record in past 12 months    ALT on record in past 12 months     Serum Creatinine on file in past 12 months    Patient is age 18 or older    No active pregnancy on record    No positive pregnancy test in the past 12 months    Recent (6 mo) or future (30 days) visit within the authorizing provider's specialty     Pharmacy   [facility] [address] [place] [address]     Same requesting pharmacy - refill request too soon. Jenny Wilks RN

## 2019-01-12 DIAGNOSIS — G25.81 RESTLESS LEG SYNDROME: ICD-10-CM

## 2019-01-16 ENCOUNTER — MYC REFILL (OUTPATIENT)
Dept: PEDIATRICS | Facility: CLINIC | Age: 59
End: 2019-01-16

## 2019-01-16 DIAGNOSIS — G25.81 RESTLESS LEG SYNDROME: ICD-10-CM

## 2019-01-16 RX ORDER — ROPINIROLE 1 MG/1
TABLET, FILM COATED ORAL
Qty: 135 TABLET | Refills: 0 | Status: SHIPPED | OUTPATIENT
Start: 2019-01-16 | End: 2019-01-16

## 2019-01-16 NOTE — TELEPHONE ENCOUNTER
rOPINIRole (REQUIP) 1 MG tablet  Last Written Prescription Date:  2/12/18  Last Fill Quantity: 135,  # refills: 3   Last office visit: 10/22/2018 with prescribing provider:   Future Office Visit:      Routing refill request to provider for review/approval because:  Labs not current:  CBC, ALT, creatinine    Gayle Castillo RN   [facility]

## 2019-01-16 NOTE — TELEPHONE ENCOUNTER
Refill is sent.  Please inform patient to schedule for annual physical, mammogram and fasting labs.

## 2019-01-21 RX ORDER — ROPINIROLE 1 MG/1
TABLET, FILM COATED ORAL
Qty: 135 TABLET | Refills: 0 | Status: SHIPPED | OUTPATIENT
Start: 2019-01-21 | End: 2019-05-06

## 2019-01-21 NOTE — TELEPHONE ENCOUNTER
Labs out of date, last complted 1/2018. Patient has an upcoming appt with PCP. Lila refill given.     Zaira Bates RN

## 2019-02-11 DIAGNOSIS — Z13.6 CARDIOVASCULAR SCREENING; LDL GOAL LESS THAN 160: ICD-10-CM

## 2019-02-11 DIAGNOSIS — Z00.00 ROUTINE GENERAL MEDICAL EXAMINATION AT A HEALTH CARE FACILITY: Primary | ICD-10-CM

## 2019-02-11 DIAGNOSIS — Z13.1 SCREENING FOR DIABETES MELLITUS (DM): ICD-10-CM

## 2019-02-11 DIAGNOSIS — Z13.0 SCREENING FOR DEFICIENCY ANEMIA: ICD-10-CM

## 2019-04-13 ENCOUNTER — ANCILLARY PROCEDURE (OUTPATIENT)
Dept: GENERAL RADIOLOGY | Facility: CLINIC | Age: 59
End: 2019-04-13
Attending: FAMILY MEDICINE
Payer: COMMERCIAL

## 2019-04-13 ENCOUNTER — OFFICE VISIT (OUTPATIENT)
Dept: PEDIATRICS | Facility: CLINIC | Age: 59
End: 2019-04-13
Payer: COMMERCIAL

## 2019-04-13 VITALS
OXYGEN SATURATION: 100 % | TEMPERATURE: 97.8 F | WEIGHT: 137.3 LBS | HEIGHT: 66 IN | SYSTOLIC BLOOD PRESSURE: 112 MMHG | DIASTOLIC BLOOD PRESSURE: 70 MMHG | HEART RATE: 92 BPM | BODY MASS INDEX: 22.07 KG/M2

## 2019-04-13 DIAGNOSIS — M25.512 ACUTE PAIN OF LEFT SHOULDER: ICD-10-CM

## 2019-04-13 DIAGNOSIS — Z91.81 H/O FALL: ICD-10-CM

## 2019-04-13 DIAGNOSIS — M54.50 ACUTE LEFT-SIDED LOW BACK PAIN WITHOUT SCIATICA: ICD-10-CM

## 2019-04-13 DIAGNOSIS — M25.552 HIP PAIN, LEFT: ICD-10-CM

## 2019-04-13 DIAGNOSIS — M62.838 MUSCLE SPASMS OF NECK: ICD-10-CM

## 2019-04-13 DIAGNOSIS — Z91.81 H/O FALL: Primary | ICD-10-CM

## 2019-04-13 PROCEDURE — 72100 X-RAY EXAM L-S SPINE 2/3 VWS: CPT | Performed by: RADIOLOGY

## 2019-04-13 PROCEDURE — 73030 X-RAY EXAM OF SHOULDER: CPT | Mod: LT | Performed by: RADIOLOGY

## 2019-04-13 PROCEDURE — 73502 X-RAY EXAM HIP UNI 2-3 VIEWS: CPT | Mod: LT | Performed by: RADIOLOGY

## 2019-04-13 PROCEDURE — 99214 OFFICE O/P EST MOD 30 MIN: CPT | Performed by: FAMILY MEDICINE

## 2019-04-13 RX ORDER — DICLOFENAC SODIUM 75 MG/1
75 TABLET, DELAYED RELEASE ORAL 2 TIMES DAILY PRN
Qty: 30 TABLET | Refills: 0 | Status: SHIPPED | OUTPATIENT
Start: 2019-04-13 | End: 2021-11-09

## 2019-04-13 RX ORDER — DULOXETIN HYDROCHLORIDE 60 MG/1
120 CAPSULE, DELAYED RELEASE ORAL DAILY
COMMUNITY
End: 2022-01-17 | Stop reason: DRUGHIGH

## 2019-04-13 RX ORDER — DEXTROAMPHETAMINE SACCHARATE, AMPHETAMINE ASPARTATE, DEXTROAMPHETAMINE SULFATE AND AMPHETAMINE SULFATE 5; 5; 5; 5 MG/1; MG/1; MG/1; MG/1
20 TABLET ORAL 2 TIMES DAILY
COMMUNITY
End: 2021-10-25

## 2019-04-13 RX ORDER — LAMOTRIGINE 100 MG/1
200 TABLET ORAL DAILY
COMMUNITY
End: 2022-01-17 | Stop reason: DRUGHIGH

## 2019-04-13 ASSESSMENT — PAIN SCALES - GENERAL: PAINLEVEL: MODERATE PAIN (5)

## 2019-04-13 ASSESSMENT — MIFFLIN-ST. JEOR: SCORE: 1206.6

## 2019-04-13 NOTE — PATIENT INSTRUCTIONS
Get the xrays today    Take tizanidine as needed for muscle spasms of neck  Take diclofenac as needed for pain  Apply ice and heat locally as needed
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
## 2019-04-15 DIAGNOSIS — M54.50 ACUTE LEFT-SIDED LOW BACK PAIN WITHOUT SCIATICA: ICD-10-CM

## 2019-04-15 DIAGNOSIS — M62.838 MUSCLE SPASMS OF NECK: ICD-10-CM

## 2019-04-15 DIAGNOSIS — M25.512 ACUTE PAIN OF LEFT SHOULDER: ICD-10-CM

## 2019-04-15 DIAGNOSIS — Z91.81 H/O FALL: ICD-10-CM

## 2019-04-15 DIAGNOSIS — M25.552 HIP PAIN, LEFT: ICD-10-CM

## 2019-04-15 RX ORDER — DICLOFENAC SODIUM 75 MG/1
75 TABLET, DELAYED RELEASE ORAL 2 TIMES DAILY PRN
Qty: 30 TABLET | Refills: 0 | OUTPATIENT
Start: 2019-04-15

## 2019-04-15 NOTE — RESULT ENCOUNTER NOTE
Dear Maddie,  Your lower back lumbar x-ray showed no concern for fracture, this is reassuring.    Let us proceed with the treatment plan as discussed including medications and physical therapy.  If you do not feel any better in 4 weeks, make sure to follow-up or sooner if needed.   Let me know if you have any questions. Take care.  Susanna Dyer MD

## 2019-04-15 NOTE — TELEPHONE ENCOUNTER
Please inform pharmacy-these medications are not for long-term use  We will not refill for 90 days

## 2019-04-15 NOTE — RESULT ENCOUNTER NOTE
Dear Maddie,  Your  hip x-ray showed moderate arthritis of your left hip but no definite concern for fracture.  Let us proceed with the treatment plan as discussed including medications and physical therapy.  If you do not feel any better in 4 weeks, make sure to follow-up or sooner if needed.   Let me know if you have any questions. Take care.  Susanna Dyer MD

## 2019-04-15 NOTE — TELEPHONE ENCOUNTER
[facility] [place]    90 DAY PRESCRIPTION REQUEST FOR:    tiZANidine (ZANAFLEX) 4 MG tablet and diclofenac (VOLTAREN) 75 MG EC tablet    Cl[facility]    Message routed to refill pool

## 2019-04-18 ENCOUNTER — DOCUMENTATION ONLY (OUTPATIENT)
Dept: LAB | Facility: CLINIC | Age: 59
End: 2019-04-18

## 2019-04-18 DIAGNOSIS — Z11.1 SCREENING EXAMINATION FOR PULMONARY TUBERCULOSIS: Primary | ICD-10-CM

## 2019-04-18 NOTE — PROGRESS NOTES
Patient is requesting to have the QPlus TB test done for her employment.   Please order labs for 4-22 appointment. Thanks, Anne VALENCIA

## 2019-04-26 ENCOUNTER — TRANSFERRED RECORDS (OUTPATIENT)
Dept: HEALTH INFORMATION MANAGEMENT | Facility: CLINIC | Age: 59
End: 2019-04-26

## 2019-05-06 ENCOUNTER — OFFICE VISIT (OUTPATIENT)
Dept: PEDIATRICS | Facility: CLINIC | Age: 59
End: 2019-05-06
Payer: COMMERCIAL

## 2019-05-06 ENCOUNTER — RESULT FOLLOW UP (OUTPATIENT)
Dept: PEDIATRICS | Facility: CLINIC | Age: 59
End: 2019-05-06

## 2019-05-06 VITALS
DIASTOLIC BLOOD PRESSURE: 78 MMHG | BODY MASS INDEX: 22.02 KG/M2 | SYSTOLIC BLOOD PRESSURE: 112 MMHG | TEMPERATURE: 98.2 F | WEIGHT: 137 LBS | HEART RATE: 79 BPM | HEIGHT: 66 IN | OXYGEN SATURATION: 99 %

## 2019-05-06 DIAGNOSIS — Z00.00 ROUTINE GENERAL MEDICAL EXAMINATION AT A HEALTH CARE FACILITY: ICD-10-CM

## 2019-05-06 DIAGNOSIS — N95.2 ATROPHIC VAGINITIS: ICD-10-CM

## 2019-05-06 DIAGNOSIS — F41.1 GENERALIZED ANXIETY DISORDER: ICD-10-CM

## 2019-05-06 DIAGNOSIS — G25.81 RESTLESS LEGS SYNDROME (RLS): Primary | ICD-10-CM

## 2019-05-06 DIAGNOSIS — Z23 NEED FOR SHINGLES VACCINE: ICD-10-CM

## 2019-05-06 DIAGNOSIS — K59.09 CHRONIC CONSTIPATION: ICD-10-CM

## 2019-05-06 DIAGNOSIS — R03.0 ELEVATED BP WITHOUT DIAGNOSIS OF HYPERTENSION: ICD-10-CM

## 2019-05-06 DIAGNOSIS — Z11.4 SCREENING FOR HUMAN IMMUNODEFICIENCY VIRUS: ICD-10-CM

## 2019-05-06 DIAGNOSIS — R91.8 PULMONARY NODULES: ICD-10-CM

## 2019-05-06 DIAGNOSIS — Z13.0 SCREENING FOR DEFICIENCY ANEMIA: ICD-10-CM

## 2019-05-06 DIAGNOSIS — Z00.00 ROUTINE GENERAL MEDICAL EXAMINATION AT A HEALTH CARE FACILITY: Primary | ICD-10-CM

## 2019-05-06 DIAGNOSIS — R73.01 ELEVATED FASTING GLUCOSE: ICD-10-CM

## 2019-05-06 DIAGNOSIS — Z11.1 SCREENING EXAMINATION FOR PULMONARY TUBERCULOSIS: ICD-10-CM

## 2019-05-06 DIAGNOSIS — Z12.39 BREAST CANCER SCREENING: ICD-10-CM

## 2019-05-06 DIAGNOSIS — Z13.1 SCREENING FOR DIABETES MELLITUS (DM): ICD-10-CM

## 2019-05-06 DIAGNOSIS — Z13.6 CARDIOVASCULAR SCREENING; LDL GOAL LESS THAN 160: ICD-10-CM

## 2019-05-06 DIAGNOSIS — Z12.4 SCREENING FOR CERVICAL CANCER: ICD-10-CM

## 2019-05-06 DIAGNOSIS — G25.81 RESTLESS LEG SYNDROME: ICD-10-CM

## 2019-05-06 LAB
ALBUMIN SERPL-MCNC: 4.1 G/DL (ref 3.4–5)
ALP SERPL-CCNC: 97 U/L (ref 40–150)
ALT SERPL W P-5'-P-CCNC: 32 U/L (ref 0–50)
ANION GAP SERPL CALCULATED.3IONS-SCNC: 5 MMOL/L (ref 3–14)
AST SERPL W P-5'-P-CCNC: 22 U/L (ref 0–45)
BASOPHILS # BLD AUTO: 0.1 10E9/L (ref 0–0.2)
BASOPHILS NFR BLD AUTO: 1 %
BILIRUB SERPL-MCNC: 0.5 MG/DL (ref 0.2–1.3)
BUN SERPL-MCNC: 18 MG/DL (ref 7–30)
CALCIUM SERPL-MCNC: 9.2 MG/DL (ref 8.5–10.1)
CHLORIDE SERPL-SCNC: 106 MMOL/L (ref 94–109)
CHOLEST SERPL-MCNC: 222 MG/DL
CO2 SERPL-SCNC: 28 MMOL/L (ref 20–32)
CREAT SERPL-MCNC: 0.71 MG/DL (ref 0.52–1.04)
DIFFERENTIAL METHOD BLD: NORMAL
EOSINOPHIL # BLD AUTO: 0.5 10E9/L (ref 0–0.7)
EOSINOPHIL NFR BLD AUTO: 6.4 %
ERYTHROCYTE [DISTWIDTH] IN BLOOD BY AUTOMATED COUNT: 11.4 % (ref 10–15)
GFR SERPL CREATININE-BSD FRML MDRD: >90 ML/MIN/{1.73_M2}
GLUCOSE SERPL-MCNC: 95 MG/DL (ref 70–99)
HCT VFR BLD AUTO: 41.9 % (ref 35–47)
HDLC SERPL-MCNC: 78 MG/DL
HGB BLD-MCNC: 13.8 G/DL (ref 11.7–15.7)
IMM GRANULOCYTES # BLD: 0 10E9/L (ref 0–0.4)
IMM GRANULOCYTES NFR BLD: 0.4 %
LDLC SERPL CALC-MCNC: 128 MG/DL
LYMPHOCYTES # BLD AUTO: 2.3 10E9/L (ref 0.8–5.3)
LYMPHOCYTES NFR BLD AUTO: 28.2 %
MCH RBC QN AUTO: 29.6 PG (ref 26.5–33)
MCHC RBC AUTO-ENTMCNC: 32.9 G/DL (ref 31.5–36.5)
MCV RBC AUTO: 90 FL (ref 78–100)
MONOCYTES # BLD AUTO: 0.6 10E9/L (ref 0–1.3)
MONOCYTES NFR BLD AUTO: 7 %
NEUTROPHILS # BLD AUTO: 4.6 10E9/L (ref 1.6–8.3)
NEUTROPHILS NFR BLD AUTO: 57 %
NONHDLC SERPL-MCNC: 144 MG/DL
PLATELET # BLD AUTO: 283 10E9/L (ref 150–450)
POTASSIUM SERPL-SCNC: 3.9 MMOL/L (ref 3.4–5.3)
PROT SERPL-MCNC: 7.4 G/DL (ref 6.8–8.8)
RBC # BLD AUTO: 4.66 10E12/L (ref 3.8–5.2)
SODIUM SERPL-SCNC: 139 MMOL/L (ref 133–144)
TRIGL SERPL-MCNC: 82 MG/DL
WBC # BLD AUTO: 8 10E9/L (ref 4–11)

## 2019-05-06 PROCEDURE — 99213 OFFICE O/P EST LOW 20 MIN: CPT | Mod: 25 | Performed by: FAMILY MEDICINE

## 2019-05-06 PROCEDURE — 99396 PREV VISIT EST AGE 40-64: CPT | Performed by: FAMILY MEDICINE

## 2019-05-06 PROCEDURE — G0145 SCR C/V CYTO,THINLAYER,RESCR: HCPCS | Performed by: FAMILY MEDICINE

## 2019-05-06 PROCEDURE — 85025 COMPLETE CBC W/AUTO DIFF WBC: CPT | Performed by: FAMILY MEDICINE

## 2019-05-06 PROCEDURE — 87624 HPV HI-RISK TYP POOLED RSLT: CPT | Performed by: FAMILY MEDICINE

## 2019-05-06 PROCEDURE — 86481 TB AG RESPONSE T-CELL SUSP: CPT | Performed by: FAMILY MEDICINE

## 2019-05-06 PROCEDURE — 80053 COMPREHEN METABOLIC PANEL: CPT | Performed by: FAMILY MEDICINE

## 2019-05-06 PROCEDURE — 36415 COLL VENOUS BLD VENIPUNCTURE: CPT | Performed by: FAMILY MEDICINE

## 2019-05-06 PROCEDURE — 80061 LIPID PANEL: CPT | Performed by: FAMILY MEDICINE

## 2019-05-06 RX ORDER — ROPINIROLE 1 MG/1
TABLET, FILM COATED ORAL
Qty: 135 TABLET | Refills: 3 | Status: SHIPPED | OUTPATIENT
Start: 2019-05-06 | End: 2020-05-13

## 2019-05-06 RX ORDER — GABAPENTIN 300 MG/1
300 CAPSULE ORAL AT BEDTIME
Qty: 90 CAPSULE | Refills: 3 | Status: SHIPPED | OUTPATIENT
Start: 2019-05-06 | End: 2019-05-14 | Stop reason: DRUGHIGH

## 2019-05-06 ASSESSMENT — MIFFLIN-ST. JEOR: SCORE: 1205.24

## 2019-05-06 ASSESSMENT — PAIN SCALES - GENERAL: PAINLEVEL: MODERATE PAIN (4)

## 2019-05-06 NOTE — PATIENT INSTRUCTIONS
Schedule for mammogram  Increase GABAPENTIN TO 300MG AT BEDTIME  Discuss with your psychiatrist on stopping CYMBALTA  Start taking vitamin D 2000 units daily      Preventive Health Recommendations  Female Ages 50 - 64    Yearly exam: See your health care provider every year in order to  o Review health changes.   o Discuss preventive care.    o Review your medicines if your doctor has prescribed any.      Get a Pap test every three years (unless you have an abnormal result and your provider advises testing more often).    If you get Pap tests with HPV test, you only need to test every 5 years, unless you have an abnormal result.     You do not need a Pap test if your uterus was removed (hysterectomy) and you have not had cancer.    You should be tested each year for STDs (sexually transmitted diseases) if you're at risk.     Have a mammogram every 1 to 2 years.    Have a colonoscopy at age 50, or have a yearly FIT test (stool test). These exams screen for colon cancer.      Have a cholesterol test every 5 years, or more often if advised.    Have a diabetes test (fasting glucose) every three years. If you are at risk for diabetes, you should have this test more often.     If you are at risk for osteoporosis (brittle bone disease), think about having a bone density scan (DEXA).    Shots: Get a flu shot each year. Get a tetanus shot every 10 years.    Nutrition:     Eat at least 5 servings of fruits and vegetables each day.    Eat whole-grain bread, whole-wheat pasta and brown rice instead of white grains and rice.    Get adequate Calcium and Vitamin D.     Lifestyle    Exercise at least 150 minutes a week (30 minutes a day, 5 days a week). This will help you control your weight and prevent disease.    Limit alcohol to one drink per day.    No smoking.     Wear sunscreen to prevent skin cancer.     See your dentist every six months for an exam and cleaning.    See your eye doctor every 1 to 2 years.
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
## 2019-05-08 LAB
COPATH REPORT: NORMAL
GAMMA INTERFERON BACKGROUND BLD IA-ACNC: 0.05 IU/ML
M TB IFN-G BLD-IMP: NEGATIVE
M TB IFN-G CD4+ BCKGRND COR BLD-ACNC: >10 IU/ML
MITOGEN IGNF BCKGRD COR BLD-ACNC: 0 IU/ML
MITOGEN IGNF BCKGRD COR BLD-ACNC: 0 IU/ML
PAP: NORMAL

## 2019-05-08 NOTE — RESULT ENCOUNTER NOTE
Dear Maddie,  Your test results for TB screening is negative, this is reassuring.  Let me know if you have any questions. Take care.  Susanna Dyer MD

## 2019-05-09 PROBLEM — R87.810 CERVICAL HIGH RISK HPV (HUMAN PAPILLOMAVIRUS) TEST POSITIVE: Status: ACTIVE | Noted: 2019-05-06

## 2019-05-09 LAB
FINAL DIAGNOSIS: ABNORMAL
HPV HR 12 DNA CVX QL NAA+PROBE: POSITIVE
HPV16 DNA SPEC QL NAA+PROBE: NEGATIVE
HPV18 DNA SPEC QL NAA+PROBE: NEGATIVE
SPECIMEN DESCRIPTION: ABNORMAL
SPECIMEN SOURCE CVX/VAG CYTO: ABNORMAL

## 2019-05-10 NOTE — PROGRESS NOTES
2006, 2007, 2008, 2009 NIL paps.  2010 NIL pap, Neg HPV.  2016 NIL pap, Neg HPV.  5/6/19 NIL pap, + HR HPV (not 16 or 18). Plan cotest in 1 year. (MRA)  5/13/19 pt notified by phone.  5/13/20 NIL Pap, Neg HPV. Plan: Cotest in 3 years.

## 2019-05-28 ENCOUNTER — TRANSFERRED RECORDS (OUTPATIENT)
Dept: HEALTH INFORMATION MANAGEMENT | Facility: CLINIC | Age: 59
End: 2019-05-28

## 2019-05-31 ENCOUNTER — MYC MEDICAL ADVICE (OUTPATIENT)
Dept: PEDIATRICS | Facility: CLINIC | Age: 59
End: 2019-05-31

## 2019-05-31 ENCOUNTER — TRANSFERRED RECORDS (OUTPATIENT)
Dept: HEALTH INFORMATION MANAGEMENT | Facility: CLINIC | Age: 59
End: 2019-05-31

## 2019-08-13 DIAGNOSIS — N95.2 ATROPHIC VAGINITIS: ICD-10-CM

## 2019-08-13 RX ORDER — CONJUGATED ESTROGENS 0.62 MG/G
CREAM VAGINAL
Qty: 30 G | Refills: 2 | Status: SHIPPED | OUTPATIENT
Start: 2019-08-13 | End: 2020-05-13

## 2019-08-23 NOTE — RESULT ENCOUNTER NOTE
Dear Maddie,  Your left shoulder xray showed no concern for fracture but it did show concerns for rotator cuff tendinitis.  Let us proceed with the treatment plan as discussed including medications and physical therapy.  If you do not feel any better in 4 weeks, make sure to follow-up or sooner if needed.   Let me know if you have any questions. Take care.  Susanna Dyer MD     Initial (On Arrival)

## 2019-08-30 ENCOUNTER — TRANSFERRED RECORDS (OUTPATIENT)
Dept: HEALTH INFORMATION MANAGEMENT | Facility: CLINIC | Age: 59
End: 2019-08-30

## 2019-10-28 ENCOUNTER — TELEPHONE (OUTPATIENT)
Dept: PEDIATRICS | Facility: CLINIC | Age: 59
End: 2019-10-28

## 2019-10-28 NOTE — TELEPHONE ENCOUNTER
Patient calls stating she has chills, headache and like her head is foggy after getting the flu shot this morning.   She denies dizziness, heart beating faster or any other symptoms.  She doesn't have a thermometer.  Informed that her symptoms are likely a normal response and her body is making antibodies to the vaccine. She will monitor and go to  if she worsens or will call back if she doesn't improve. Patient verbalized understanding.    Lorena Granda RN

## 2019-11-08 ENCOUNTER — TRANSFERRED RECORDS (OUTPATIENT)
Dept: HEALTH INFORMATION MANAGEMENT | Facility: CLINIC | Age: 59
End: 2019-11-08

## 2019-12-08 ENCOUNTER — HEALTH MAINTENANCE LETTER (OUTPATIENT)
Age: 59
End: 2019-12-08

## 2020-03-15 ENCOUNTER — HEALTH MAINTENANCE LETTER (OUTPATIENT)
Age: 60
End: 2020-03-15

## 2020-04-09 ENCOUNTER — TELEPHONE (OUTPATIENT)
Dept: PEDIATRICS | Facility: CLINIC | Age: 60
End: 2020-04-09

## 2020-04-09 NOTE — TELEPHONE ENCOUNTER
Message routed to provider to review and advise if ok for testing and in office visit.Ericka PENA,CMA

## 2020-04-09 NOTE — TELEPHONE ENCOUNTER
[facility] Call Center    Phone Message    May a detailed message be left on voicemail: yes     Reason for Call: Other: Patient called and would like call back to discuss scheduling her HPV check up. Patient said it is important she get tested and would not like to wait until July.      Action Taken: Message routed to:  Primary Care p [phone]

## 2020-04-10 NOTE — TELEPHONE ENCOUNTER
Called pt gave provider message to pt,call was dropped and lost connection. Pt to call back and she can schedule for physical/PAP after 5/6/2020.            CALL CENTER     If pt calls back please schedule for physical and PAP. Thank you    Ericka PENA CMA

## 2020-04-10 NOTE — TELEPHONE ENCOUNTER
Reviewed chart.  She is not due for pap/HPV until 5/6/2020.  Let us see her on or after 5/6/2020 for repeat PAP and physical

## 2020-04-13 ENCOUNTER — TELEPHONE (OUTPATIENT)
Dept: PEDIATRICS | Facility: CLINIC | Age: 60
End: 2020-04-13

## 2020-04-13 DIAGNOSIS — G25.81 RESTLESS LEG SYNDROME: ICD-10-CM

## 2020-04-13 RX ORDER — GABAPENTIN 100 MG/1
300 CAPSULE ORAL AT BEDTIME
Qty: 210 CAPSULE | Refills: 0 | Status: SHIPPED | OUTPATIENT
Start: 2020-04-13 | End: 2020-04-20

## 2020-04-13 NOTE — TELEPHONE ENCOUNTER
[facility]    Phone Message    May a detailed message be left on voicemail: no     Reason for Call: Medication Question or concern regarding medication   Prescription Clarification  Name of Medication: Gabapentin  Prescribing Provider: Manisha   Pharmacy:      [id number] [address] [facility] [address]     What on the order needs clarification Pt increased her medication on her own.     Was on 300mg and reduced to 200mg.  She is having break through symptoms with her restless legs and went back to 300mg.       Action Taken: Message routed to:  Primary Care p [id number]    Travel Screening:

## 2020-04-13 NOTE — TELEPHONE ENCOUNTER
message routed to provider to review and advise for ok to increase for break through pain her gabapentin from 200mg to 300 mg.Ericka PENA,CMA

## 2020-04-20 ENCOUNTER — TELEPHONE (OUTPATIENT)
Dept: PEDIATRICS | Facility: CLINIC | Age: 60
End: 2020-04-20

## 2020-04-20 RX ORDER — GABAPENTIN 100 MG/1
300 CAPSULE ORAL AT BEDTIME
Qty: 210 CAPSULE | Refills: 0 | Status: SHIPPED | OUTPATIENT
Start: 2020-04-20 | End: 2020-05-13 | Stop reason: DRUGHIGH

## 2020-04-20 NOTE — TELEPHONE ENCOUNTER
[facility] Call [facility]    Phone Message    May a detailed message be left on voicemail: yes     Reason for Call: Medication Question or concern regarding medication   Prescription Clarification  Name of Medication: gabapentin (NEURONTIN) 100 MG capsule [6545] (Order [id number])     Prescribing Provider:    Pharmacy: [facility]   What on the order needs clarification? Pharmacy called, per [facility] conversation please call back to for early refill request.           Action Taken: Message routed to:  Primary Care p [id number]    Travel Screening: Not Applicable

## 2020-04-20 NOTE — TELEPHONE ENCOUNTER
Called [facility] they will fill today and inform patient when ready.    Called patient and left message with information that there is a refill that was sent and to call [facility] before picking up (if they haven't called)    Nay Carolina RN, [facility]

## 2020-04-20 NOTE — TELEPHONE ENCOUNTER
There are 2 encounters going for this medication. Will close this encounter.    Nay Carolina RN, [facility]

## 2020-04-20 NOTE — TELEPHONE ENCOUNTER
Yuir is requesting a call to have an early refill of Gabapentin for the patient.  Please advise.  Thank you.

## 2020-04-20 NOTE — TELEPHONE ENCOUNTER
Patient called and states pharmacy will not allow refill for gabapentin until May 4th, said it is too early. Patient needs Dr Dyer to call pharmacy and ok patient  today. Please call to discuss.

## 2020-04-27 ENCOUNTER — TRANSFERRED RECORDS (OUTPATIENT)
Dept: HEALTH INFORMATION MANAGEMENT | Facility: CLINIC | Age: 60
End: 2020-04-27

## 2020-04-28 ENCOUNTER — TELEPHONE (OUTPATIENT)
Dept: PEDIATRICS | Facility: CLINIC | Age: 60
End: 2020-04-28

## 2020-04-28 NOTE — TELEPHONE ENCOUNTER
I was told earlier that patient was anxious not to postpone the visit.   Ok to keep it as scheduled in May

## 2020-04-28 NOTE — TELEPHONE ENCOUNTER
Called patient to reschedule future physical with fasting lab appointments to July or after. Patient states she was diagnosed with HPV and requires repeat testing within 1 year and would prefer to complete in person visit to repeat testing before July. Rescheduled patient visit and routing to Dr. Dyer to confirm need for in person testing before July as specified by patient.    Future Office Visit:   Next 5 appointments (look out 90 days)    May 13, 2020  8:10 AM CDT  Return Visit with Susanna Dyer MD  [facility] [address] [place] [id number] [phone]         Nhung WHITFIELD CMA

## 2020-05-08 ENCOUNTER — TRANSFERRED RECORDS (OUTPATIENT)
Dept: HEALTH INFORMATION MANAGEMENT | Facility: CLINIC | Age: 60
End: 2020-05-08

## 2020-05-12 ENCOUNTER — TELEPHONE (OUTPATIENT)
Dept: PEDIATRICS | Facility: CLINIC | Age: 60
End: 2020-05-12

## 2020-05-12 DIAGNOSIS — Z00.00 ROUTINE GENERAL MEDICAL EXAMINATION AT A HEALTH CARE FACILITY: Primary | ICD-10-CM

## 2020-05-12 DIAGNOSIS — Z13.6 CARDIOVASCULAR SCREENING; LDL GOAL LESS THAN 160: ICD-10-CM

## 2020-05-12 DIAGNOSIS — Z13.0 SCREENING FOR DEFICIENCY ANEMIA: ICD-10-CM

## 2020-05-12 DIAGNOSIS — Z13.1 SCREENING FOR DIABETES MELLITUS (DM): ICD-10-CM

## 2020-05-12 NOTE — TELEPHONE ENCOUNTER
Patient not currently taking cholesterol lowering medication, [facility] fasting labs every 5 years.  Forwarding to provider for review/signature if appropriate, does not meet standing lab order criteria.    Kandy Elizabeth RN

## 2020-05-13 ENCOUNTER — OFFICE VISIT (OUTPATIENT)
Dept: PEDIATRICS | Facility: CLINIC | Age: 60
End: 2020-05-13
Payer: COMMERCIAL

## 2020-05-13 VITALS
HEIGHT: 66 IN | WEIGHT: 135 LBS | TEMPERATURE: 97.7 F | SYSTOLIC BLOOD PRESSURE: 128 MMHG | DIASTOLIC BLOOD PRESSURE: 87 MMHG | BODY MASS INDEX: 21.69 KG/M2 | HEART RATE: 88 BPM | OXYGEN SATURATION: 96 %

## 2020-05-13 DIAGNOSIS — Z13.6 CARDIOVASCULAR SCREENING; LDL GOAL LESS THAN 160: ICD-10-CM

## 2020-05-13 DIAGNOSIS — Z90.711 S/P ABDOMINAL SUPRACERVICAL SUBTOTAL HYSTERECTOMY: ICD-10-CM

## 2020-05-13 DIAGNOSIS — G47.00 PERSISTENT DISORDER OF INITIATING OR MAINTAINING SLEEP: ICD-10-CM

## 2020-05-13 DIAGNOSIS — Z12.4 CERVICAL CANCER SCREENING: ICD-10-CM

## 2020-05-13 DIAGNOSIS — Z13.0 SCREENING FOR DEFICIENCY ANEMIA: ICD-10-CM

## 2020-05-13 DIAGNOSIS — F41.9 ANXIETY AND DEPRESSION: ICD-10-CM

## 2020-05-13 DIAGNOSIS — N95.2 ATROPHIC VAGINITIS: ICD-10-CM

## 2020-05-13 DIAGNOSIS — Z13.1 SCREENING FOR DIABETES MELLITUS (DM): ICD-10-CM

## 2020-05-13 DIAGNOSIS — Z12.39 BREAST CANCER SCREENING: ICD-10-CM

## 2020-05-13 DIAGNOSIS — F32.A ANXIETY AND DEPRESSION: ICD-10-CM

## 2020-05-13 DIAGNOSIS — Z00.00 ROUTINE GENERAL MEDICAL EXAMINATION AT A HEALTH CARE FACILITY: ICD-10-CM

## 2020-05-13 DIAGNOSIS — G25.81 RESTLESS LEGS SYNDROME (RLS): ICD-10-CM

## 2020-05-13 DIAGNOSIS — Z12.11 COLON CANCER SCREENING: ICD-10-CM

## 2020-05-13 DIAGNOSIS — Z00.00 ROUTINE GENERAL MEDICAL EXAMINATION AT A HEALTH CARE FACILITY: Primary | ICD-10-CM

## 2020-05-13 DIAGNOSIS — F98.8 ATTENTION DEFICIT DISORDER (ADD) WITHOUT HYPERACTIVITY: ICD-10-CM

## 2020-05-13 DIAGNOSIS — G25.81 RESTLESS LEG SYNDROME: ICD-10-CM

## 2020-05-13 DIAGNOSIS — R91.8 PULMONARY NODULES: ICD-10-CM

## 2020-05-13 DIAGNOSIS — R87.810 CERVICAL HIGH RISK HPV (HUMAN PAPILLOMAVIRUS) TEST POSITIVE: ICD-10-CM

## 2020-05-13 DIAGNOSIS — R73.01 ELEVATED FASTING GLUCOSE: ICD-10-CM

## 2020-05-13 DIAGNOSIS — Z23 NEED FOR SHINGLES VACCINE: ICD-10-CM

## 2020-05-13 LAB
ALBUMIN SERPL-MCNC: 4 G/DL (ref 3.4–5)
ALP SERPL-CCNC: 92 U/L (ref 40–150)
ALT SERPL W P-5'-P-CCNC: 29 U/L (ref 0–50)
ANION GAP SERPL CALCULATED.3IONS-SCNC: 4 MMOL/L (ref 3–14)
AST SERPL W P-5'-P-CCNC: 19 U/L (ref 0–45)
BASOPHILS # BLD AUTO: 0.1 10E9/L (ref 0–0.2)
BASOPHILS NFR BLD AUTO: 1.1 %
BILIRUB SERPL-MCNC: 0.6 MG/DL (ref 0.2–1.3)
BUN SERPL-MCNC: 20 MG/DL (ref 7–30)
CALCIUM SERPL-MCNC: 9.3 MG/DL (ref 8.5–10.1)
CHLORIDE SERPL-SCNC: 105 MMOL/L (ref 94–109)
CHOLEST SERPL-MCNC: 223 MG/DL
CO2 SERPL-SCNC: 28 MMOL/L (ref 20–32)
CREAT SERPL-MCNC: 0.71 MG/DL (ref 0.52–1.04)
DIFFERENTIAL METHOD BLD: NORMAL
EOSINOPHIL # BLD AUTO: 0.5 10E9/L (ref 0–0.7)
EOSINOPHIL NFR BLD AUTO: 6.5 %
ERYTHROCYTE [DISTWIDTH] IN BLOOD BY AUTOMATED COUNT: 11.4 % (ref 10–15)
GFR SERPL CREATININE-BSD FRML MDRD: >90 ML/MIN/{1.73_M2}
GLUCOSE SERPL-MCNC: 97 MG/DL (ref 70–99)
HCT VFR BLD AUTO: 43.3 % (ref 35–47)
HDLC SERPL-MCNC: 80 MG/DL
HGB BLD-MCNC: 14 G/DL (ref 11.7–15.7)
IMM GRANULOCYTES # BLD: 0 10E9/L (ref 0–0.4)
IMM GRANULOCYTES NFR BLD: 0.5 %
LDLC SERPL CALC-MCNC: 120 MG/DL
LYMPHOCYTES # BLD AUTO: 3 10E9/L (ref 0.8–5.3)
LYMPHOCYTES NFR BLD AUTO: 36.2 %
MCH RBC QN AUTO: 29 PG (ref 26.5–33)
MCHC RBC AUTO-ENTMCNC: 32.3 G/DL (ref 31.5–36.5)
MCV RBC AUTO: 90 FL (ref 78–100)
MONOCYTES # BLD AUTO: 0.7 10E9/L (ref 0–1.3)
MONOCYTES NFR BLD AUTO: 8.8 %
NEUTROPHILS # BLD AUTO: 3.9 10E9/L (ref 1.6–8.3)
NEUTROPHILS NFR BLD AUTO: 46.9 %
NONHDLC SERPL-MCNC: 143 MG/DL
PLATELET # BLD AUTO: 333 10E9/L (ref 150–450)
POTASSIUM SERPL-SCNC: 3.8 MMOL/L (ref 3.4–5.3)
PROT SERPL-MCNC: 7.5 G/DL (ref 6.8–8.8)
RBC # BLD AUTO: 4.83 10E12/L (ref 3.8–5.2)
SODIUM SERPL-SCNC: 137 MMOL/L (ref 133–144)
TRIGL SERPL-MCNC: 117 MG/DL
VIT B12 SERPL-MCNC: 495 PG/ML (ref 193–986)
WBC # BLD AUTO: 8.3 10E9/L (ref 4–11)

## 2020-05-13 PROCEDURE — 80053 COMPREHEN METABOLIC PANEL: CPT | Performed by: FAMILY MEDICINE

## 2020-05-13 PROCEDURE — 99000 SPECIMEN HANDLING OFFICE-LAB: CPT | Performed by: FAMILY MEDICINE

## 2020-05-13 PROCEDURE — 85025 COMPLETE CBC W/AUTO DIFF WBC: CPT | Performed by: FAMILY MEDICINE

## 2020-05-13 PROCEDURE — 99396 PREV VISIT EST AGE 40-64: CPT | Performed by: FAMILY MEDICINE

## 2020-05-13 PROCEDURE — 80061 LIPID PANEL: CPT | Performed by: FAMILY MEDICINE

## 2020-05-13 PROCEDURE — 82607 VITAMIN B-12: CPT | Performed by: FAMILY MEDICINE

## 2020-05-13 PROCEDURE — 87624 HPV HI-RISK TYP POOLED RSLT: CPT | Performed by: FAMILY MEDICINE

## 2020-05-13 PROCEDURE — 88142 CYTOPATH C/V THIN LAYER: CPT | Performed by: FAMILY MEDICINE

## 2020-05-13 PROCEDURE — 36415 COLL VENOUS BLD VENIPUNCTURE: CPT | Performed by: FAMILY MEDICINE

## 2020-05-13 PROCEDURE — 87389 HIV-1 AG W/HIV-1&-2 AB AG IA: CPT | Performed by: FAMILY MEDICINE

## 2020-05-13 RX ORDER — ROPINIROLE 1 MG/1
TABLET, FILM COATED ORAL
Qty: 135 TABLET | Refills: 3 | Status: SHIPPED | OUTPATIENT
Start: 2020-05-13 | End: 2021-03-08

## 2020-05-13 RX ORDER — GABAPENTIN 300 MG/1
300 CAPSULE ORAL AT BEDTIME
Qty: 90 CAPSULE | Refills: 3 | Status: SHIPPED | OUTPATIENT
Start: 2020-05-13 | End: 2021-03-26

## 2020-05-13 RX ORDER — TRAZODONE HYDROCHLORIDE 50 MG/1
TABLET, FILM COATED ORAL
Qty: 135 TABLET | Refills: 3 | Status: SHIPPED | OUTPATIENT
Start: 2020-05-13 | End: 2021-11-09

## 2020-05-13 ASSESSMENT — MIFFLIN-ST. JEOR: SCORE: 1186.17
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
## 2020-05-14 LAB — HIV 1+2 AB+HIV1 P24 AG SERPL QL IA: NONREACTIVE

## 2020-05-14 NOTE — RESULT ENCOUNTER NOTE
Dear Maddie,  Your test results for HIV screening is negative, this is good and reassuring.   Let me know if you have any questions. Take care.  .Susanna Dyer MD

## 2020-05-15 LAB — METHYLMALONATE SERPL-SCNC: 0.14 UMOL/L (ref 0–0.4)

## 2020-05-18 ENCOUNTER — TRANSFERRED RECORDS (OUTPATIENT)
Dept: HEALTH INFORMATION MANAGEMENT | Facility: CLINIC | Age: 60
End: 2020-05-18

## 2020-05-19 LAB
COPATH REPORT: NORMAL
PAP: NORMAL

## 2020-05-21 DIAGNOSIS — G25.81 RESTLESS LEG SYNDROME: ICD-10-CM

## 2020-05-22 LAB
FINAL DIAGNOSIS: NORMAL
HPV HR 12 DNA CVX QL NAA+PROBE: NEGATIVE
HPV16 DNA SPEC QL NAA+PROBE: NEGATIVE
HPV18 DNA SPEC QL NAA+PROBE: NEGATIVE
SPECIMEN DESCRIPTION: NORMAL
SPECIMEN SOURCE CVX/VAG CYTO: NORMAL

## 2020-05-23 RX ORDER — ROPINIROLE 1 MG/1
TABLET, FILM COATED ORAL
Qty: 135 TABLET | Refills: 3 | OUTPATIENT
Start: 2020-05-23

## 2020-06-01 ENCOUNTER — TRANSFERRED RECORDS (OUTPATIENT)
Dept: HEALTH INFORMATION MANAGEMENT | Facility: CLINIC | Age: 60
End: 2020-06-01

## 2020-10-19 DIAGNOSIS — Z11.1 SCREENING EXAMINATION FOR PULMONARY TUBERCULOSIS: ICD-10-CM

## 2020-10-19 PROCEDURE — 36415 COLL VENOUS BLD VENIPUNCTURE: CPT | Performed by: FAMILY MEDICINE

## 2020-10-19 PROCEDURE — 86481 TB AG RESPONSE T-CELL SUSP: CPT | Performed by: FAMILY MEDICINE

## 2020-10-22 LAB
GAMMA INTERFERON BACKGROUND BLD IA-ACNC: 0.08 IU/ML
M TB IFN-G CD4+ BCKGRND COR BLD-ACNC: 9.92 IU/ML
M TB TUBERC IFN-G BLD QL: NEGATIVE
MITOGEN IGNF BCKGRD COR BLD-ACNC: 0.06 IU/ML
MITOGEN IGNF BCKGRD COR BLD-ACNC: 0.14 IU/ML

## 2020-12-30 ENCOUNTER — TELEPHONE (OUTPATIENT)
Dept: NURSING | Facility: CLINIC | Age: 60
End: 2020-12-30

## 2020-12-30 NOTE — TELEPHONE ENCOUNTER
.  [facility]: Nurse Triage Note  SITUATION/BACKGROUND                                                      Zari Francisco is a 60 year old female who calls with concern of symptoms after receiving Covid19 injection yesterday.   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 (not able to see in chart).   Injection given in left arm. Today left arm is sore. She took a couple of tylenol. However, pain is radiating across her shoulder and back. Overall feeling fuzzy and generalized body weakness. Her temp last night was 93.6 and this morning 93.1. (automated ear thermometer).   Denies any other symptoms at this time.   This nurse advised patient to seek ED care for evaluation. Do not drive self.   Patient verbalized an understanding and agreed with plan. She will have her  take her to  [place] ED now. ..     Routed FYI to Primary Care [place].

## 2021-01-09 ENCOUNTER — HEALTH MAINTENANCE LETTER (OUTPATIENT)
Age: 61
End: 2021-01-09

## 2021-03-26 ENCOUNTER — VIRTUAL VISIT (OUTPATIENT)
Dept: FAMILY MEDICINE | Facility: CLINIC | Age: 61
End: 2021-03-26
Payer: COMMERCIAL

## 2021-03-26 DIAGNOSIS — G25.81 RESTLESS LEG SYNDROME: ICD-10-CM

## 2021-03-26 DIAGNOSIS — M25.551 HIP PAIN, RIGHT: ICD-10-CM

## 2021-03-26 DIAGNOSIS — S56.911A STRAIN OF RIGHT ELBOW, INITIAL ENCOUNTER: ICD-10-CM

## 2021-03-26 DIAGNOSIS — M54.41 RIGHT-SIDED LOW BACK PAIN WITH RIGHT-SIDED SCIATICA, UNSPECIFIED CHRONICITY: ICD-10-CM

## 2021-03-26 DIAGNOSIS — Z91.81 H/O FALL: Primary | ICD-10-CM

## 2021-03-26 PROCEDURE — 99215 OFFICE O/P EST HI 40 MIN: CPT | Mod: GT | Performed by: FAMILY MEDICINE

## 2021-03-26 RX ORDER — GABAPENTIN 300 MG/1
CAPSULE ORAL
Qty: 180 CAPSULE | Refills: 3 | Status: SHIPPED | OUTPATIENT
Start: 2021-03-26 | End: 2021-04-05

## 2021-03-26 NOTE — PROGRESS NOTES
Zari is a 60 year old who is being evaluated via a billable video visit.      How would you like to obtain your AVS? MyChart  If the video visit is dropped, the invitation should be resent by: Send to e-mail at: [email or web address]  Will anyone else be joining your video visit? No    Video Start Time: 3:53pm    Assessment & Plan     H/O fall  Due to ongoing pain in the right hip and right lower back with sciatica, recommended to proceed with x-rays  for further evaluation to exclude fractures.  Will f/u on results and call with recommendations.    - XR Pelvis w Hip Right G/E 2 Views; Future  - XR Lumbar Spine 2/3 Views; Future    Hip pain, right  as above    - XR Pelvis w Hip Right G/E 2 Views; Future    Right-sided low back pain with right-sided sciatica, unspecified chronicity  as above    - XR Pelvis w Hip Right G/E 2 Views; Future  - XR Lumbar Spine 2/3 Views; Future    Strain of right elbow, initial encounter  Resolved bruise, continues to have mild pain on extreme extension.  Continue to monitor, apply ice and heat as needed, avoid triggering activities    Restless leg syndrome  With progressively worsening of severe symptoms, recommended to proceed with consult to sleep physician for further evaluation, will get a ferritin level today.  Continue taking Requip 0.5 mg in the evening around 4 PM and 1 mg at bedtime along with gabapentin 300 mg at bedtime.  We will start taking extra dose of gabapentin 300 mg in the evening around 4 PM along with continuing the 300 mg of gabapentin at bedtime.  I suspect her other mental health medications including Lamictal, Cymbalta and Adderall could be contributing to part of her restless legs, though patient reported having restless leg symptoms for many years even before she started on Cymbalta and Adderall.    - SLEEP EVALUATION & MANAGEMENT REFERRAL - ADULT -[facility] [place]  [phone] (Age 15 and up); Future  - Ferritin; Future  .-       gabapentin (NEURONTIN) 300 MG capsule      49 minutes spent on the date of the encounter doing chart review, review of test results, interpretation of tests, patient visit and documentation       Chart documentation done in part with Dragon Voice recognition Software. Although reviewed after completion, some word and grammatical error may remain.    See Patient Instructions    No follow-ups on file.    Suasnna Dyer MD  [facility] ESTELA Hameed is a 60 year old who presents for the following health issues     HPI        Patient fell on ice x a couple weeks ago and fell on right hip. Patient has tried heat/ice/ibuprofen with little to no relief.   Patient is here for a video visit instead of in person visit due to the current COVID-19 pandemic.  Patient is here today for a video visit to follow-up on her recent history of fall on February 25 at the [employer] parking lot, where she had a bad fall on the broad slippery [employer] parking lot, fell on her right elbow and right hip, sustained a bruise of the right elbow and severe pain of the right hip and right lower back.  Patient reported that she was not able to get up after the fall, she had to call for help to slide her to get closer to her car so she could scoot herself in the driving seat without getting up and attempting to walk and fall again.  Patient denies history of hitting her head or losing consciousness.  She felt improvement in her right elbow bruise and pain in a couple of weeks.  But she continues to have severe pain in the right hip and right lower back especially while she wakes up in the morning or whenever she puts pressure on the right hip including laying on the right side.  She continues to work and does her routine daily activities with no restrictions, but with ongoing pain as mentioned above.  She has a past medical history significant for depression, anxiety, restless legs and ADHD.  Patient has been  experiencing severe restless leg symptoms that has been worsening in the past several months, but she literally threw her leg up on the bed waking herself up affecting her sleep, causing daytime drowsiness and affecting her quality of life during work days.  She is currently taking Requip and gabapentin for her restless legs.  Patient reported having a sleep study several years ago at the [facility].  We do not have those reports to review.  She denies concerns for snoring, but has significant bruxism.          Review of Systems   CONSTITUTIONAL: NEGATIVE for fever, chills, change in weight  INTEGUMENTARY/SKIN: NEGATIVE for worrisome rashes, moles or lesions  RESP: NEGATIVE for significant cough or SOB  CV: NEGATIVE for chest pain, palpitations or peripheral edema  MUSCULOSKELETAL: as above  NEURO: NEGATIVE for weakness, dizziness or paresthesias  HEME/ALLERGY/IMMUNE: NEGATIVE for bleeding problems  PSYCHIATRIC: NEGATIVE for changes in mood or affect and history of anxiety, depression, ADHD      Objective           Vitals:  No vitals were obtained today due to virtual visit.    Physical Exam   GENERAL: Healthy, alert and no distress  RESP: No audible wheeze, cough, or visible cyanosis.  No visible retractions or increased work of breathing.    SKIN: Visible skin clear. No significant rash, abnormal pigmentation or lesions.  NEURO: mentation intact  PSYCH: Mentation appears normal, affect normal/bright, judgement and insight intact, normal speech and appearance well-groomed.                Video-Visit Details    Type of service:  Video Visit    Video End Time:4;22pm    Originating Location (pt. Location): Home    Distant Location (provider location):  [facility]     Platform used for Video Visit: FND

## 2021-03-31 ENCOUNTER — ANCILLARY PROCEDURE (OUTPATIENT)
Dept: GENERAL RADIOLOGY | Facility: CLINIC | Age: 61
End: 2021-03-31
Attending: FAMILY MEDICINE
Payer: COMMERCIAL

## 2021-03-31 DIAGNOSIS — Z91.81 H/O FALL: ICD-10-CM

## 2021-03-31 DIAGNOSIS — M25.551 HIP PAIN, RIGHT: ICD-10-CM

## 2021-03-31 DIAGNOSIS — M54.41 RIGHT-SIDED LOW BACK PAIN WITH RIGHT-SIDED SCIATICA, UNSPECIFIED CHRONICITY: ICD-10-CM

## 2021-03-31 PROCEDURE — 72100 X-RAY EXAM L-S SPINE 2/3 VWS: CPT | Performed by: RADIOLOGY

## 2021-03-31 PROCEDURE — 73521 X-RAY EXAM HIPS BI 2 VIEWS: CPT | Performed by: RADIOLOGY

## 2021-03-31 NOTE — RESULT ENCOUNTER NOTE
Hi Maddie,  Your x-rays of the hip and  the lower back did not show any concern for fracture or dislocation but with mild degenerative arthritis.  This is reassuring   Let me know if you have any questions. Take care.  Susanna Dyer MD
Orthopedic

## 2021-03-31 NOTE — RESULT ENCOUNTER NOTE
Hi Maddie,  Your x-rays of the hip and  the lower back did not show any concern for fracture or dislocation but with mild degenerative arthritis.  This is reassuring   Let me know if you have any questions. Take care.  Susanna Dyer MD

## 2021-04-05 ENCOUNTER — MYC MEDICAL ADVICE (OUTPATIENT)
Dept: FAMILY MEDICINE | Facility: CLINIC | Age: 61
End: 2021-04-05

## 2021-04-05 DIAGNOSIS — G25.81 RESTLESS LEG SYNDROME: Primary | ICD-10-CM

## 2021-04-05 RX ORDER — GABAPENTIN 300 MG/1
CAPSULE ORAL
Qty: 180 CAPSULE | Refills: 3 | COMMUNITY
Start: 2021-04-05 | End: 2021-04-26

## 2021-04-05 RX ORDER — GABAPENTIN 100 MG/1
CAPSULE ORAL
Qty: 30 CAPSULE | Refills: 0 | Status: SHIPPED | OUTPATIENT
Start: 2021-04-05 | End: 2021-04-19

## 2021-05-08 ENCOUNTER — HEALTH MAINTENANCE LETTER (OUTPATIENT)
Age: 61
End: 2021-05-08

## 2021-05-26 ENCOUNTER — MYC MEDICAL ADVICE (OUTPATIENT)
Dept: FAMILY MEDICINE | Facility: CLINIC | Age: 61
End: 2021-05-26

## 2021-05-26 DIAGNOSIS — S56.911A STRAIN OF RIGHT ELBOW, INITIAL ENCOUNTER: ICD-10-CM

## 2021-05-26 DIAGNOSIS — M25.521 RIGHT ELBOW PAIN: ICD-10-CM

## 2021-05-26 DIAGNOSIS — Z91.81 H/O FALL: Primary | ICD-10-CM

## 2021-05-27 NOTE — TELEPHONE ENCOUNTER
Call to Amelia  Left a message on reason for call regarding her MyChart message  We will call her later today or tomorrow

## 2021-05-27 NOTE — TELEPHONE ENCOUNTER
Called Maddie again.  She apologized for sending a harsh [employer] message yesterday,stating she  felt like she wanted to send at the heat of the moment.  She elaborated on her frustration over the insurance on the charges on the video visit and that she had to pay over thousand dollars for virtual video visit and her 's office visit.  She also talked about her 's CT results and the future appointments for the ENT and pulmonary consult for him.  She still has pain on the right elbow on extension and while trying to school something lift anything with the right hand.  While we were talking, patient had to stop the conversation and go, since she was called by the nurses for interpretation for one of the patients.  [employer] message sent for patient to get her elbow x-ray and start on PT.

## 2021-06-10 DIAGNOSIS — Z12.31 VISIT FOR SCREENING MAMMOGRAM: ICD-10-CM

## 2021-06-10 PROCEDURE — 77067 SCR MAMMO BI INCL CAD: CPT | Performed by: RADIOLOGY

## 2021-06-10 PROCEDURE — 77063 BREAST TOMOSYNTHESIS BI: CPT | Performed by: RADIOLOGY

## 2021-06-14 PROBLEM — F41.9 ANXIETY AND DEPRESSION: Chronic | Status: ACTIVE | Noted: 2020-05-13

## 2021-06-14 PROBLEM — F32.A ANXIETY AND DEPRESSION: Chronic | Status: ACTIVE | Noted: 2020-05-13
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
## 2021-06-14 NOTE — PATIENT INSTRUCTIONS
Your BMI is There is no height or weight on file to calculate BMI.  Weight management is a personal decision.  If you are interested in exploring weight loss strategies, the following discussion covers the approaches that may be successful. Body mass index (BMI) is one way to tell whether you are at a healthy weight, overweight, or obese. It measures your weight in relation to your height.  A BMI of 18.5 to 24.9 is in the healthy range. A person with a BMI of 25 to 29.9 is considered overweight, and someone with a BMI of 30 or greater is considered obese. More than two-thirds of American adults are considered overweight or obese.  Being overweight or obese increases the risk for further weight gain. Excess weight may lead to heart disease and diabetes.  Creating and following plans for healthy eating and physical activity may help you improve your health.  Weight control is part of healthy lifestyle and includes exercise, emotional health, and healthy eating habits. Careful eating habits lifelong are the mainstay of weight control. Though there are significant health benefits from weight loss, long-term weight loss with diet alone may be very difficult to achieve- studies show long-term success with dietary management in less than 10% of people. Attaining a healthy weight may be especially difficult to achieve in those with severe obesity. In some cases, medications, devices and surgical management might be considered.  What can you do?  If you are overweight or obese and are interested in methods for weight loss, you should discuss this with your provider.     Consider reducing daily calorie intake by 500 calories.     Keep a food journal.     Avoiding skipping meals, consider cutting portions instead.    Diet combined with exercise helps maintain muscle while optimizing fat loss. Strength training is particularly important for building and maintaining muscle mass. Exercise helps reduce stress, increase energy,  and improves fitness. Increasing exercise without diet control, however, may not burn enough calories to loose weight.       Start walking three days a week 10-20 minutes at a time    Work towards walking thirty minutes five days a week     Eventually, increase the speed of your walking for 1-2 minutes at time    In addition, we recommend that you review healthy lifestyles and methods for weight loss available through the National Institutes of Health patient information sites:  http://win.niddk.nih.gov/publications/index.htm    And look into health and wellness programs that may be available through your health insurance provider, employer, local community center, or jose a club.    Weight management plan: Patient was referred to their PCP to discuss a diet and exercise plan.     Medication Information:  --Be alert for side effects such as compulsive behaviors (gambling, excessive shopping, eating, inappropriate sexual behavior, other compulsive behavior disruptive to self or others), dark moles on skin sudden episodes of falling asleep or other side effects.  --Reports worsened symptoms of RLS.  --This medication should not be discontinued abruptly and should be tapered over a one week period.  --This medication can be taken with or without food.  --This medication is not safe in pregnancy or while breast feeding.  If pregnancy occurs your should contact your provider and medication should be discontinued.    Conservative Measures for RLS (Restless Leg Syndrome):  Patients with restless leg syndrome (RLS) may be helped by nonpharmacologic approaches in addition to prescription medications.  Good sleep habits (sleep hygiene) may be helpful to patients with RLS.      Patients should avoid the following:    Extremes of sustained inactivity or excessive exercising.  However, moderate, regular exercise is recommended.    Antihistamines: include those found in most allergy, cold, and sinus preparations    Avoid late in  the afternoon and evening:    Caffeine    Tobacco    Alcohol    Patients should try:    Mild stretching exercises at bedtime    Baths with water at extreme temperatures (either hot or cold)    Pastimes that promote mental activity, such as puzzles, video/computer, games, etc .

## 2021-06-15 ENCOUNTER — VIRTUAL VISIT (OUTPATIENT)
Dept: SLEEP MEDICINE | Facility: CLINIC | Age: 61
End: 2021-06-15
Attending: FAMILY MEDICINE
Payer: COMMERCIAL

## 2021-06-15 DIAGNOSIS — G25.81 RESTLESS LEG SYNDROME: ICD-10-CM

## 2021-06-15 PROCEDURE — 99203 OFFICE O/P NEW LOW 30 MIN: CPT | Mod: GT | Performed by: PHYSICIAN ASSISTANT

## 2021-07-03 ENCOUNTER — HEALTH MAINTENANCE LETTER (OUTPATIENT)
Age: 61
End: 2021-07-03

## 2021-08-16 ENCOUNTER — MYC MEDICAL ADVICE (OUTPATIENT)
Dept: SLEEP MEDICINE | Facility: CLINIC | Age: 61
End: 2021-08-16

## 2021-08-18 ENCOUNTER — TRANSFERRED RECORDS (OUTPATIENT)
Dept: HEALTH INFORMATION MANAGEMENT | Facility: CLINIC | Age: 61
End: 2021-08-18

## 2021-08-19 NOTE — TELEPHONE ENCOUNTER
I spoke with the patient regarding her sleep concerns. She will continue on Trazodone 75 mg/  She will take Gabapentin at 8 PM and Ropinirole plus Lamotrigine around bedtime.

## 2021-09-16 VITALS — BODY MASS INDEX: 21.69 KG/M2 | HEIGHT: 66 IN | WEIGHT: 135 LBS

## 2021-09-16 ASSESSMENT — MIFFLIN-ST. JEOR: SCORE: 1181.17
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
## 2021-09-16 NOTE — PATIENT INSTRUCTIONS
Your BMI is Body mass index is 22.12 kg/m .  Weight management is a personal decision.  If you are interested in exploring weight loss strategies, the following discussion covers the approaches that may be successful. Body mass index (BMI) is one way to tell whether you are at a healthy weight, overweight, or obese. It measures your weight in relation to your height.  A BMI of 18.5 to 24.9 is in the healthy range. A person with a BMI of 25 to 29.9 is considered overweight, and someone with a BMI of 30 or greater is considered obese. More than two-thirds of American adults are considered overweight or obese.  Being overweight or obese increases the risk for further weight gain. Excess weight may lead to heart disease and diabetes.  Creating and following plans for healthy eating and physical activity may help you improve your health.  Weight control is part of healthy lifestyle and includes exercise, emotional health, and healthy eating habits. Careful eating habits lifelong are the mainstay of weight control. Though there are significant health benefits from weight loss, long-term weight loss with diet alone may be very difficult to achieve- studies show long-term success with dietary management in less than 10% of people. Attaining a healthy weight may be especially difficult to achieve in those with severe obesity. In some cases, medications, devices and surgical management might be considered.  What can you do?  If you are overweight or obese and are interested in methods for weight loss, you should discuss this with your provider.     Consider reducing daily calorie intake by 500 calories.     Keep a food journal.     Avoiding skipping meals, consider cutting portions instead.    Diet combined with exercise helps maintain muscle while optimizing fat loss. Strength training is particularly important for building and maintaining muscle mass. Exercise helps reduce stress, increase energy, and improves fitness.  Increasing exercise without diet control, however, may not burn enough calories to loose weight.       Start walking three days a week 10-20 minutes at a time    Work towards walking thirty minutes five days a week     Eventually, increase the speed of your walking for 1-2 minutes at time    In addition, we recommend that you review healthy lifestyles and methods for weight loss available through the National Institutes of Health patient information sites:  http://win.niddk.nih.gov/publications/index.htm    And look into health and wellness programs that may be available through your health insurance provider, employer, local community center, or [employer].

## 2021-09-17 ENCOUNTER — VIRTUAL VISIT (OUTPATIENT)
Dept: SLEEP MEDICINE | Facility: CLINIC | Age: 61
End: 2021-09-17
Payer: COMMERCIAL

## 2021-09-17 DIAGNOSIS — G25.81 RESTLESS LEGS SYNDROME (RLS): Primary | ICD-10-CM

## 2021-09-17 PROBLEM — F98.8 ATTENTION DEFICIT DISORDER (ADD) WITHOUT HYPERACTIVITY: Chronic | Status: ACTIVE | Noted: 2020-05-13

## 2021-09-17 PROCEDURE — 99214 OFFICE O/P EST MOD 30 MIN: CPT | Mod: GT | Performed by: PHYSICIAN ASSISTANT

## 2021-10-01 ENCOUNTER — TELEPHONE (OUTPATIENT)
Dept: SLEEP MEDICINE | Facility: CLINIC | Age: 61
End: 2021-10-01

## 2021-10-01 NOTE — TELEPHONE ENCOUNTER
Reason for Call:  Other appointment    Detailed comments: patent has a current [facility] in [facility]  Was suggested by provider to be seen last week of October for this appointment.  Wait list requested.  Thank you.    Phone Number Patient can be reached at: Home number on file;  [phone]    Best Time: any    Can we leave a detailed message on this number? YES    Call taken on 10/1/2021 at 10:37 AM by Yamilet Ray

## 2021-10-04 ENCOUNTER — LAB (OUTPATIENT)
Dept: LAB | Facility: CLINIC | Age: 61
End: 2021-10-04
Payer: COMMERCIAL

## 2021-10-04 DIAGNOSIS — Z11.1 SCREENING EXAMINATION FOR PULMONARY TUBERCULOSIS: ICD-10-CM

## 2021-10-04 DIAGNOSIS — Z13.1 SCREENING FOR DIABETES MELLITUS (DM): ICD-10-CM

## 2021-10-04 DIAGNOSIS — G25.81 RESTLESS LEG SYNDROME: ICD-10-CM

## 2021-10-04 DIAGNOSIS — Z13.0 SCREENING FOR DEFICIENCY ANEMIA: ICD-10-CM

## 2021-10-04 DIAGNOSIS — Z13.29 SCREENING FOR THYROID DISORDER: ICD-10-CM

## 2021-10-04 DIAGNOSIS — R73.01 ELEVATED FASTING BLOOD SUGAR: Primary | ICD-10-CM

## 2021-10-04 LAB
ALBUMIN SERPL-MCNC: 3.8 G/DL (ref 3.4–5)
ALP SERPL-CCNC: 87 U/L (ref 40–150)
ALT SERPL W P-5'-P-CCNC: 25 U/L (ref 0–50)
ANION GAP SERPL CALCULATED.3IONS-SCNC: 3 MMOL/L (ref 3–14)
AST SERPL W P-5'-P-CCNC: 14 U/L (ref 0–45)
BILIRUB SERPL-MCNC: 0.3 MG/DL (ref 0.2–1.3)
BUN SERPL-MCNC: 25 MG/DL (ref 7–30)
CALCIUM SERPL-MCNC: 9.1 MG/DL (ref 8.5–10.1)
CHLORIDE BLD-SCNC: 107 MMOL/L (ref 94–109)
CO2 SERPL-SCNC: 31 MMOL/L (ref 20–32)
CREAT SERPL-MCNC: 0.87 MG/DL (ref 0.52–1.04)
ERYTHROCYTE [DISTWIDTH] IN BLOOD BY AUTOMATED COUNT: 11.4 % (ref 10–15)
FERRITIN SERPL-MCNC: 34 NG/ML (ref 8–252)
GFR SERPL CREATININE-BSD FRML MDRD: 72 ML/MIN/1.73M2
GLUCOSE BLD-MCNC: 111 MG/DL (ref 70–99)
HBA1C MFR BLD: 5.6 % (ref 0–5.6)
HCT VFR BLD AUTO: 38.2 % (ref 35–47)
HGB BLD-MCNC: 12.8 G/DL (ref 11.7–15.7)
MCH RBC QN AUTO: 30.3 PG (ref 26.5–33)
MCHC RBC AUTO-ENTMCNC: 33.5 G/DL (ref 31.5–36.5)
MCV RBC AUTO: 91 FL (ref 78–100)
PLATELET # BLD AUTO: 257 10E3/UL (ref 150–450)
POTASSIUM BLD-SCNC: 3.5 MMOL/L (ref 3.4–5.3)
PROT SERPL-MCNC: 6.9 G/DL (ref 6.8–8.8)
RBC # BLD AUTO: 4.22 10E6/UL (ref 3.8–5.2)
SODIUM SERPL-SCNC: 141 MMOL/L (ref 133–144)
TSH SERPL DL<=0.005 MIU/L-ACNC: 1.17 MU/L (ref 0.4–4)
WBC # BLD AUTO: 8 10E3/UL (ref 4–11)

## 2021-10-04 PROCEDURE — 36415 COLL VENOUS BLD VENIPUNCTURE: CPT

## 2021-10-04 PROCEDURE — 80053 COMPREHEN METABOLIC PANEL: CPT

## 2021-10-04 PROCEDURE — 85027 COMPLETE CBC AUTOMATED: CPT

## 2021-10-04 PROCEDURE — 83036 HEMOGLOBIN GLYCOSYLATED A1C: CPT

## 2021-10-04 PROCEDURE — 84443 ASSAY THYROID STIM HORMONE: CPT

## 2021-10-04 PROCEDURE — 82728 ASSAY OF FERRITIN: CPT

## 2021-10-04 PROCEDURE — 86481 TB AG RESPONSE T-CELL SUSP: CPT

## 2021-10-04 NOTE — TELEPHONE ENCOUNTER
Spoke with patient offered her a sooner appointment with Cynthia REDD but she had a conflict with the date and time. Patient has been put on the wait list.

## 2021-10-06 LAB
GAMMA INTERFERON BACKGROUND BLD IA-ACNC: 0.03 IU/ML
M TB IFN-G BLD-IMP: NEGATIVE
M TB IFN-G CD4+ BCKGRND COR BLD-ACNC: 9.97 IU/ML
MITOGEN IGNF BCKGRD COR BLD-ACNC: 0.06 IU/ML
MITOGEN IGNF BCKGRD COR BLD-ACNC: 0.06 IU/ML
QUANTIFERON MITOGEN: 10 IU/ML
QUANTIFERON NIL TUBE: 0.03 IU/ML
QUANTIFERON TB1 TUBE: 0.09 IU/ML
QUANTIFERON TB2 TUBE: 0.09

## 2021-10-12 NOTE — RESULT ENCOUNTER NOTE
Dear Zari,   Dr. Dyer is out of the office. I'm review your results on her behalf.     Your recent test results showed the following:  --Comprehensive metabolic panel is normal except for glucose in the prediabetes range  -- Your blood sugar is in the prediabetes range. Prediabetes means that your blood sugar level is higher than normal, but it's not yet increased enough to be classified as type 2 diabetes. Still, without intervention, prediabetes is likely to become type 2 diabetes in 10 years or less. And, if you have prediabetes, the long-term damage of diabetes - especially to your heart and circulatory system - may already be starting.     There's good news, however. Prediabetes can be an opportunity for you to improve your health, because progression from prediabetes to type 2 diabetes isn't inevitable. With healthy lifestyle changes - such as eating healthy foods, including physical activity in your daily routine and maintaining a healthy weight - you may be able to bring your blood sugar level back to normal.     Here is a web site link from [facility] on this condition and life style change you can make to improve this. I hope this is helpful.     [email or web address]    Navarro Graham MD       Please call or Mychart to our office if you have further questions.     Navarro Graham MD-PhD

## 2021-10-12 NOTE — RESULT ENCOUNTER NOTE
Dear Zari,   Your recent test result are within acceptable range or at baseline. Please continue with your current plan of care.       Please call or Mychart to our office if you have further questions.     Navarro Graham MD-PhD

## 2021-10-18 ENCOUNTER — MYC MEDICAL ADVICE (OUTPATIENT)
Dept: FAMILY MEDICINE | Facility: CLINIC | Age: 61
End: 2021-10-18

## 2021-10-19 ASSESSMENT — ENCOUNTER SYMPTOMS
BREAST MASS: 0
HEADACHES: 0
ARTHRALGIAS: 1
NERVOUS/ANXIOUS: 1
DYSURIA: 0
ABDOMINAL PAIN: 0
EYE PAIN: 0
JOINT SWELLING: 0
SORE THROAT: 0
DIARRHEA: 0
DIZZINESS: 0
FREQUENCY: 0
CONSTIPATION: 0
MYALGIAS: 0
SHORTNESS OF BREATH: 0
FEVER: 0
PALPITATIONS: 0
NAUSEA: 0
HEARTBURN: 0
HEMATURIA: 0
CHILLS: 0
HEMATOCHEZIA: 0
COUGH: 0
PARESTHESIAS: 0
WEAKNESS: 0

## 2021-10-23 ENCOUNTER — HEALTH MAINTENANCE LETTER (OUTPATIENT)
Age: 61
End: 2021-10-23

## 2021-10-24 ASSESSMENT — ENCOUNTER SYMPTOMS
PALPITATIONS: 0
ARTHRALGIAS: 1
HEMATURIA: 0
FREQUENCY: 0
SORE THROAT: 0
WEAKNESS: 0
JOINT SWELLING: 0
CHILLS: 0
DIARRHEA: 0
CONSTIPATION: 0
EYE PAIN: 0
NERVOUS/ANXIOUS: 1
HEMATOCHEZIA: 0
MYALGIAS: 0
PARESTHESIAS: 0
HEARTBURN: 0
DIZZINESS: 0
DYSURIA: 0
SHORTNESS OF BREATH: 0
ABDOMINAL PAIN: 0
BREAST MASS: 0
COUGH: 0
NAUSEA: 0
HEADACHES: 0
FEVER: 0
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
## 2021-10-25 ENCOUNTER — OFFICE VISIT (OUTPATIENT)
Dept: FAMILY MEDICINE | Facility: CLINIC | Age: 61
End: 2021-10-25
Payer: COMMERCIAL

## 2021-10-25 VITALS
HEIGHT: 65 IN | WEIGHT: 143.6 LBS | RESPIRATION RATE: 18 BRPM | DIASTOLIC BLOOD PRESSURE: 82 MMHG | OXYGEN SATURATION: 99 % | TEMPERATURE: 98.7 F | SYSTOLIC BLOOD PRESSURE: 122 MMHG | HEART RATE: 70 BPM | BODY MASS INDEX: 23.93 KG/M2

## 2021-10-25 DIAGNOSIS — N95.2 ATROPHIC VAGINITIS: ICD-10-CM

## 2021-10-25 DIAGNOSIS — F41.1 GENERALIZED ANXIETY DISORDER: ICD-10-CM

## 2021-10-25 DIAGNOSIS — Z12.11 COLON CANCER SCREENING: ICD-10-CM

## 2021-10-25 DIAGNOSIS — G25.81 RESTLESS LEG SYNDROME: ICD-10-CM

## 2021-10-25 DIAGNOSIS — Z23 NEED FOR TDAP VACCINATION: ICD-10-CM

## 2021-10-25 DIAGNOSIS — Z23 NEED FOR SHINGLES VACCINE: ICD-10-CM

## 2021-10-25 DIAGNOSIS — Z00.00 ROUTINE GENERAL MEDICAL EXAMINATION AT A HEALTH CARE FACILITY: Primary | ICD-10-CM

## 2021-10-25 DIAGNOSIS — Z12.31 ENCOUNTER FOR SCREENING MAMMOGRAM FOR MALIGNANT NEOPLASM OF BREAST: ICD-10-CM

## 2021-10-25 DIAGNOSIS — Z12.4 CERVICAL CANCER SCREENING: ICD-10-CM

## 2021-10-25 DIAGNOSIS — F98.8 ATTENTION DEFICIT DISORDER (ADD) WITHOUT HYPERACTIVITY: ICD-10-CM

## 2021-10-25 PROCEDURE — 99396 PREV VISIT EST AGE 40-64: CPT | Performed by: FAMILY MEDICINE

## 2021-10-25 RX ORDER — ROPINIROLE 1 MG/1
TABLET, FILM COATED ORAL
Qty: 135 TABLET | Refills: 3 | Status: SHIPPED | OUTPATIENT
Start: 2021-10-25 | End: 2022-07-19

## 2021-10-25 ASSESSMENT — MIFFLIN-ST. JEOR: SCORE: 1212.25

## 2021-10-25 ASSESSMENT — PAIN SCALES - GENERAL: PAINLEVEL: NO PAIN (0)

## 2021-10-26 ENCOUNTER — MYC MEDICAL ADVICE (OUTPATIENT)
Dept: FAMILY MEDICINE | Facility: CLINIC | Age: 61
End: 2021-10-26

## 2021-10-27 NOTE — TELEPHONE ENCOUNTER
[facility]ia [facility], patient is requesting a refill of Gabapentin 100 mg, writer does not see on patients medication list

## 2021-10-27 NOTE — TELEPHONE ENCOUNTER
I see that she was given 1 year supply on gabapentin on 4/26//2021  Please inform patient to take with pharmacy first

## 2021-10-27 NOTE — TELEPHONE ENCOUNTER
gabapentin (NEURONTIN) 100 MG capsule   Sig: Take 100 mg tab with 300 mg tab at bedtime for restless legs.      Medication no longer on current med list, removed as of 10/25/21.  See patient MyChart messages below. Asking if PCP can fill this time, till gets further clarification if Sleep Medicine provider, Dr. Braxton is to be prescribing or not.      Calista Lester RN  [facility]

## 2021-10-28 ENCOUNTER — TELEPHONE (OUTPATIENT)
Dept: FAMILY MEDICINE | Facility: CLINIC | Age: 61
End: 2021-10-28

## 2021-10-28 ENCOUNTER — MYC MEDICAL ADVICE (OUTPATIENT)
Dept: FAMILY MEDICINE | Facility: CLINIC | Age: 61
End: 2021-10-28

## 2021-10-28 NOTE — TELEPHONE ENCOUNTER
Called pt and LVM asking her to call back or answer my Tumblr message. I need to know if this pt got her tetanus vaccine at her appt on 10/25 and if so what arm did she get it in? We did not document it at that time.

## 2021-11-08 NOTE — PROGRESS NOTES
Medication Therapy Management (MTM) Encounter    ASSESSMENT:                            Medication Adherence/Access: No issues identified    RlS: Could consider iron supplementation with ferritin <75ng/mL. Patient unable to tolerate oral iron therapy due to severe constipation. Could consider IV therapy. There are post-marketing reports of duloxetine causing restless legs. Could consider alternate therapy in the future.     Insomnia: Stable.    ADHD/Anxiety: Will be starting new lamotrigine dose. Patient follows with Isidro.    Constatipation: Stable.    PLAN:                            Could consider IV iron therapy. Will consult with Sleep Clinic    Follow-up: after hearing back from sleep clinic    SUBJECTIVE/OBJECTIVE:                          Zari Francisco is a 61 year old female contacted via secure video for an initial visit. She was referred to me from Susanna Dyer      Reason for visit: RLS.    Allergies/ADRs: Reviewed in chart  Past Medical History: Reviewed in chart  Tobacco: She reports that she has never smoked. She has never used smokeless tobacco. Might smoke while on vacaton  Alcohol: history of alcohol dependence and sober for 30 years  Caffeine: was recommended she not have any caffeine by sleep specialist to help with her legs. Has not   Activity: works with  6 times a month, has horses, rides horses and physical work. Minimum steps 9044-9847. Trying to stretch before going to bed. When she does stretch before bed feels it helps her legs.  Is a .    Medication Adherence/Access: Patient takes medications directly from bottles.  Per patient, misses medication 0 times per week.   Yuri Underwood  Medication barriers: none.     RLS: current therapy includes gabapentin 400mg at 730pm, ropinirole 0.5mg during at 2pm and 1mg an hour before bedtime (has been on this dose for 13 years). Bedtime is usually around 10pm. Patient is following with sleep clinic. As  soon as she fully relax in the afternoon her legs start to bother her. Denies side effects. Medications work for awhile and then the medications wear off. No morning breakthrough symptoms.   Cannot take iron because it causes constipation.   Calcium and Vitamin D causes constipation.   Reports her legs got worse when duloxetine was added.  Ferritin   Date Value Ref Range Status   10/04/2021 34 8 - 252 ng/mL Final   10/22/2018 29 8 - 252 ng/mL Final     Insomnia: Current medications include: trazodone 50mg, magnesium glycinate 800mg daily. Patient reports falling asleep ok usually sleeps 7-8 hours. Legs are under control during the night. Morning grogginess has improved. Sleep clinic is working on reducing her trazodone dose.     ADHD/Anxiety: current medications include adderall 10mg daily, duloxetine 120mg daily, lamotrigine 200mg daily (patient is to increase her dose to 225mg daily)  [facility] at [facility]. Last visit was 10/21/2021 and lamotrigine was increased to help with anxiety.    Constipation: magnesium glycinate has been helping her with constipation so she hasn't had to use the miralax.     Today's Vitals: There were no vitals taken for this visit.  ----------------    I spent 36 minutes with this patient today. All changes were made via collaborative practice agreement with Susanna Dyer A copy of the visit note was provided to the patient's primary care provider.    The patient was sent via [employer] a summary of these recommendations.     Nay Francois, Pharm.D, [facility]  Medication Therapy Management Pharmacist      Telemedicine Visit Details  Type of service:  Video Conference via ClickShift  Start Time: 3:30PM  End Time: 4:06PM  Originating Location (patient location): Home  Distant Location (provider location):  [facility]     Medication Therapy Recommendations  Restless leg syndrome    Rationale: Synergistic therapy - Needs additional medication therapy - Indication    Recommendation: Start Medication - INJECTAFER IV   Status: Contact Provider - Awaiting Response

## 2021-11-09 ENCOUNTER — VIRTUAL VISIT (OUTPATIENT)
Dept: PHARMACY | Facility: CLINIC | Age: 61
End: 2021-11-09
Attending: FAMILY MEDICINE

## 2021-11-09 DIAGNOSIS — K59.00 CONSTIPATION, UNSPECIFIED CONSTIPATION TYPE: ICD-10-CM

## 2021-11-09 DIAGNOSIS — F98.8 ATTENTION DEFICIT DISORDER (ADD) WITHOUT HYPERACTIVITY: ICD-10-CM

## 2021-11-09 DIAGNOSIS — G47.00 PERSISTENT DISORDER OF INITIATING OR MAINTAINING SLEEP: ICD-10-CM

## 2021-11-09 DIAGNOSIS — G25.81 RESTLESS LEG SYNDROME: ICD-10-CM

## 2021-11-09 DIAGNOSIS — F32.A ANXIETY AND DEPRESSION: Primary | ICD-10-CM

## 2021-11-09 DIAGNOSIS — F41.9 ANXIETY AND DEPRESSION: Primary | ICD-10-CM

## 2021-11-09 PROCEDURE — 99605 MTMS BY PHARM NP 15 MIN: CPT | Performed by: PHARMACIST

## 2021-11-09 PROCEDURE — 99607 MTMS BY PHARM ADDL 15 MIN: CPT | Performed by: PHARMACIST

## 2021-11-09 RX ORDER — TRAZODONE HYDROCHLORIDE 50 MG/1
50 TABLET, FILM COATED ORAL AT BEDTIME
Status: CANCELLED
Start: 2021-11-09

## 2021-11-09 RX ORDER — TRAZODONE HYDROCHLORIDE 50 MG/1
50 TABLET, FILM COATED ORAL AT BEDTIME
COMMUNITY
End: 2022-02-28

## 2021-11-09 NOTE — Clinical Note
Abdulkadir Marie,  I met with Zari today and was reviewing therapy for her restless legs. Her most recent ferritin was 34ng/ml (10/4/21). Wondering if you would consider starting iron therapy with a ferritin less than 75ng/ml? She is unable to tolerate oral iron due to severe constipation, so we would need to consider IV therapy.     Thanks for considering,  Nay Francois, Pharm.D, BCACP  Medication Therapy Management Pharmacist

## 2021-11-09 NOTE — PATIENT INSTRUCTIONS
Recommendations from today's MTM visit:                                                    MTM (medication therapy management) is a service provided by a clinical pharmacist designed to help you get the most of out of your medicines.      Could consider IV iron. Will reach out to sleep clinic for further direction.    Follow-up: after hearing back from sleep clinic    It was great to speak with you today.  I value your experience and would be very thankful for your time with providing feedback on our clinic survey. You may receive a survey via email or text message in the next few days.     To schedule another MTM appointment, please call the clinic directly or you may call the MTM scheduling line at [phone] or toll-free at [phone].     My Clinical Pharmacist's contact information:                                                      Please feel free to contact me with any questions or concerns you have.      Nay Francois, Pharm.D, BCACP  Medication Therapy Management Pharmacist

## 2021-11-17 ENCOUNTER — OFFICE VISIT (OUTPATIENT)
Dept: ORTHOPEDICS | Facility: CLINIC | Age: 61
End: 2021-11-17
Attending: FAMILY MEDICINE
Payer: COMMERCIAL

## 2021-11-17 DIAGNOSIS — M51.16 LUMBAR DISC HERNIATION WITH RADICULOPATHY: ICD-10-CM

## 2021-11-17 DIAGNOSIS — M54.16 LUMBAR RADICULAR PAIN: Primary | ICD-10-CM

## 2021-11-17 DIAGNOSIS — M25.551 HIP PAIN, RIGHT: ICD-10-CM

## 2021-11-17 PROCEDURE — 99204 OFFICE O/P NEW MOD 45 MIN: CPT | Performed by: PREVENTIVE MEDICINE

## 2021-11-17 RX ORDER — METHYLPREDNISOLONE 4 MG
TABLET, DOSE PACK ORAL
Qty: 21 TABLET | Refills: 0 | Status: SHIPPED | OUTPATIENT
Start: 2021-11-17 | End: 2022-07-19

## 2021-11-17 ASSESSMENT — PAIN SCALES - GENERAL: PAINLEVEL: NO PAIN (0)
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
## 2021-11-29 ENCOUNTER — IMMUNIZATION (OUTPATIENT)
Dept: NURSING | Facility: CLINIC | Age: 61
End: 2021-11-29
Payer: COMMERCIAL

## 2021-11-29 PROCEDURE — 91300 PR COVID VAC PFIZER DIL RECON 30 MCG/0.3 ML IM: CPT

## 2021-11-29 PROCEDURE — 0004A PR COVID VAC PFIZER DIL RECON 30 MCG/0.3 ML IM: CPT

## 2021-12-01 ASSESSMENT — SLEEP AND FATIGUE QUESTIONNAIRES
HOW LIKELY ARE YOU TO NOD OFF OR FALL ASLEEP WHILE SITTING AND READING: WOULD NEVER DOZE
HOW LIKELY ARE YOU TO NOD OFF OR FALL ASLEEP WHILE LYING DOWN TO REST IN THE AFTERNOON WHEN CIRCUMSTANCES PERMIT: MODERATE CHANCE OF DOZING
HOW LIKELY ARE YOU TO NOD OFF OR FALL ASLEEP WHILE WATCHING TV: WOULD NEVER DOZE
HOW LIKELY ARE YOU TO NOD OFF OR FALL ASLEEP WHILE SITTING INACTIVE IN A PUBLIC PLACE: WOULD NEVER DOZE
HOW LIKELY ARE YOU TO NOD OFF OR FALL ASLEEP WHILE SITTING AND TALKING TO SOMEONE: WOULD NEVER DOZE
HOW LIKELY ARE YOU TO NOD OFF OR FALL ASLEEP WHEN YOU ARE A PASSENGER IN A CAR FOR AN HOUR WITHOUT A BREAK: WOULD NEVER DOZE
HOW LIKELY ARE YOU TO NOD OFF OR FALL ASLEEP WHILE SITTING QUIETLY AFTER LUNCH WITHOUT ALCOHOL: WOULD NEVER DOZE
HOW LIKELY ARE YOU TO NOD OFF OR FALL ASLEEP IN A CAR, WHILE STOPPED FOR A FEW MINUTES IN TRAFFIC: WOULD NEVER DOZE

## 2021-12-08 ENCOUNTER — VIRTUAL VISIT (OUTPATIENT)
Dept: SLEEP MEDICINE | Facility: CLINIC | Age: 61
End: 2021-12-08
Payer: COMMERCIAL

## 2021-12-08 DIAGNOSIS — G25.81 RESTLESS LEGS SYNDROME (RLS): Primary | ICD-10-CM

## 2021-12-08 PROCEDURE — 99214 OFFICE O/P EST MOD 30 MIN: CPT | Mod: 95 | Performed by: PHYSICIAN ASSISTANT
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
## 2021-12-09 NOTE — NURSING NOTE
Attempted to reach patient via phone unsuccessful left message to call back to schedule 1-month follow up appointment.           HUGO Townsend  [facility]

## 2021-12-20 ENCOUNTER — TELEPHONE (OUTPATIENT)
Dept: SLEEP MEDICINE | Facility: CLINIC | Age: 61
End: 2021-12-20
Payer: COMMERCIAL

## 2021-12-20 NOTE — TELEPHONE ENCOUNTER
Reason for call:  Other   Patient called regarding (reason for call): call back  Additional comments: Patient is calling to see if she should be seen sooner than 2/1 as patient needs to be seen 4 weeks after last appointment on 12/8. Please call patient.    Phone number to reach patient:  Home number on file [phone] (home)    Best Time:  Any time    Can we leave a detailed message on this number?  YES    Travel screening: Not Applicable

## 2021-12-28 ENCOUNTER — IMMUNIZATION (OUTPATIENT)
Dept: FAMILY MEDICINE | Facility: CLINIC | Age: 61
End: 2021-12-28
Payer: COMMERCIAL

## 2021-12-28 PROCEDURE — 90682 RIV4 VACC RECOMBINANT DNA IM: CPT

## 2021-12-28 PROCEDURE — 90471 IMMUNIZATION ADMIN: CPT

## 2022-01-03 ENCOUNTER — ANCILLARY PROCEDURE (OUTPATIENT)
Dept: MRI IMAGING | Facility: CLINIC | Age: 62
End: 2022-01-03
Attending: PREVENTIVE MEDICINE
Payer: COMMERCIAL

## 2022-01-03 DIAGNOSIS — M54.16 LUMBAR RADICULAR PAIN: ICD-10-CM

## 2022-01-03 DIAGNOSIS — M51.16 LUMBAR DISC HERNIATION WITH RADICULOPATHY: ICD-10-CM

## 2022-01-03 PROCEDURE — 72148 MRI LUMBAR SPINE W/O DYE: CPT | Performed by: RADIOLOGY

## 2022-01-16 ASSESSMENT — PATIENT HEALTH QUESTIONNAIRE - PHQ9
SUM OF ALL RESPONSES TO PHQ QUESTIONS 1-9: 4
SUM OF ALL RESPONSES TO PHQ QUESTIONS 1-9: 4
10. IF YOU CHECKED OFF ANY PROBLEMS, HOW DIFFICULT HAVE THESE PROBLEMS MADE IT FOR YOU TO DO YOUR WORK, TAKE CARE OF THINGS AT HOME, OR GET ALONG WITH OTHER PEOPLE: NOT DIFFICULT AT ALL
10. IF YOU CHECKED OFF ANY PROBLEMS, HOW DIFFICULT HAVE THESE PROBLEMS MADE IT FOR YOU TO DO YOUR WORK, TAKE CARE OF THINGS AT HOME, OR GET ALONG WITH OTHER PEOPLE: NOT DIFFICULT AT ALL
SUM OF ALL RESPONSES TO PHQ QUESTIONS 1-9: 4
SUM OF ALL RESPONSES TO PHQ QUESTIONS 1-9: 4

## 2022-01-17 ENCOUNTER — VIRTUAL VISIT (OUTPATIENT)
Dept: PSYCHOLOGY | Facility: CLINIC | Age: 62
End: 2022-01-17
Payer: COMMERCIAL

## 2022-01-17 ENCOUNTER — VIRTUAL VISIT (OUTPATIENT)
Dept: PSYCHIATRY | Facility: CLINIC | Age: 62
End: 2022-01-17
Attending: FAMILY MEDICINE
Payer: COMMERCIAL

## 2022-01-17 DIAGNOSIS — F98.8 ATTENTION DEFICIT DISORDER (ADD) WITHOUT HYPERACTIVITY: ICD-10-CM

## 2022-01-17 DIAGNOSIS — F10.11 ALCOHOL ABUSE, IN REMISSION: ICD-10-CM

## 2022-01-17 DIAGNOSIS — F41.9 ANXIETY: Primary | ICD-10-CM

## 2022-01-17 DIAGNOSIS — F90.9 ATTENTION DEFICIT HYPERACTIVITY DISORDER (ADHD), UNSPECIFIED ADHD TYPE: ICD-10-CM

## 2022-01-17 DIAGNOSIS — F39 MOOD DISORDER (H): ICD-10-CM

## 2022-01-17 DIAGNOSIS — F41.1 GENERALIZED ANXIETY DISORDER: Primary | ICD-10-CM

## 2022-01-17 PROCEDURE — 90791 PSYCH DIAGNOSTIC EVALUATION: CPT | Mod: 95 | Performed by: PSYCHOLOGIST

## 2022-01-17 PROCEDURE — 99205 OFFICE O/P NEW HI 60 MIN: CPT | Mod: 95 | Performed by: PSYCHIATRY & NEUROLOGY

## 2022-01-17 RX ORDER — DULOXETIN HYDROCHLORIDE 60 MG/1
60 CAPSULE, DELAYED RELEASE ORAL DAILY
Qty: 90 CAPSULE | Refills: 0 | Status: SHIPPED | OUTPATIENT
Start: 2022-01-17 | End: 2022-05-20

## 2022-01-17 RX ORDER — CLONIDINE HYDROCHLORIDE 0.1 MG/1
0.1 TABLET ORAL AT BEDTIME
Qty: 30 TABLET | Refills: 1 | Status: SHIPPED | OUTPATIENT
Start: 2022-01-17 | End: 2022-02-08

## 2022-01-17 RX ORDER — DEXTROAMPHETAMINE SACCHARATE, AMPHETAMINE ASPARTATE, DEXTROAMPHETAMINE SULFATE AND AMPHETAMINE SULFATE 2.5; 2.5; 2.5; 2.5 MG/1; MG/1; MG/1; MG/1
10 TABLET ORAL DAILY
Qty: 30 TABLET | Refills: 0 | Status: SHIPPED | OUTPATIENT
Start: 2022-01-17 | End: 2022-02-28

## 2022-01-17 RX ORDER — LAMOTRIGINE 100 MG/1
100 TABLET ORAL 2 TIMES DAILY
Qty: 180 TABLET | Refills: 0 | Status: SHIPPED | OUTPATIENT
Start: 2022-01-17 | End: 2022-02-08

## 2022-01-17 RX ORDER — DULOXETIN HYDROCHLORIDE 30 MG/1
30 CAPSULE, DELAYED RELEASE ORAL DAILY
Qty: 90 CAPSULE | Refills: 0 | Status: SHIPPED | OUTPATIENT
Start: 2022-01-17 | End: 2022-04-26

## 2022-01-17 ASSESSMENT — COLUMBIA-SUICIDE SEVERITY RATING SCALE - C-SSRS
2. HAVE YOU ACTUALLY HAD ANY THOUGHTS OF KILLING YOURSELF?: NO
ATTEMPT PAST THREE MONTHS: NO
2. HAVE YOU ACTUALLY HAD ANY THOUGHTS OF KILLING YOURSELF LIFETIME?: NO
5. HAVE YOU STARTED TO WORK OUT OR WORKED OUT THE DETAILS OF HOW TO KILL YOURSELF? DO YOU INTEND TO CARRY OUT THIS PLAN?: NO
3. HAVE YOU BEEN THINKING ABOUT HOW YOU MIGHT KILL YOURSELF?: NO
4. HAVE YOU HAD THESE THOUGHTS AND HAD SOME INTENTION OF ACTING ON THEM?: NO
1. IN THE PAST MONTH, HAVE YOU WISHED YOU WERE DEAD OR WISHED YOU COULD GO TO SLEEP AND NOT WAKE UP?: NO
4. HAVE YOU HAD THESE THOUGHTS AND HAD SOME INTENTION OF ACTING ON THEM?: NO
5. HAVE YOU STARTED TO WORK OUT OR WORKED OUT THE DETAILS OF HOW TO KILL YOURSELF? DO YOU INTEND TO CARRY OUT THIS PLAN?: NO
1. IN THE PAST MONTH, HAVE YOU WISHED YOU WERE DEAD OR WISHED YOU COULD GO TO SLEEP AND NOT WAKE UP?: NO
ATTEMPT LIFETIME: NO

## 2022-01-17 ASSESSMENT — PATIENT HEALTH QUESTIONNAIRE - PHQ9
SUM OF ALL RESPONSES TO PHQ QUESTIONS 1-9: 4
SUM OF ALL RESPONSES TO PHQ QUESTIONS 1-9: 4

## 2022-01-17 NOTE — PATIENT INSTRUCTIONS
Treatment Plan:    Continue ropinirole and gabapentin as prescribed by sleep medicine/pcp.     Continue Adderall immediate release 10 mg daily as needed for ADHD.      Continue trazodone 50 mg at bedtime as needed for sleep. Can decrease to 25 mg at bedtime (and even trial discontinuation) if other changes going well.     Decrease duloxetine/Cymbalta to 90 mg daily for anxiety, mood. Can alternate 90 mg and 120 m g dose every other day/every few days to slow down taper as needed.     Decrease lamotrigine to 200 mg daily for mood/augmentation to duloxetine. We will likely continue to taper this medication over time.     Start clonidine 0.1 mg at bedtime for ADHD/irritability/impulsivity.      Recommend individual psychotherapy for additional support and ongoing development of nonpharmacologic coping skills and strategies.  Discussed DBT and ACT therapies.    Continue all other cares per primary care provider.     Continue all other medications as reviewed per electronic medical record today.     Safety plan reviewed. To the Emergency Department as needed or call after hours crisis line at [phone] or [phone]. [place] Crisis Text Line: Text [place] to 377212  or  Suicide LifeLine Chat: suicidepreventionlifeline.org/chat    Schedule an appointment with me in 4 weeks or sooner as needed.  Call [facility] at [phone] to schedule.    Follow up with primary care provider as planned or sooner if needed for acute medical concerns.    Call the psychiatric nurse line with medication questions or concerns at [phone].    DreamHosthart may be used to communicate with your provider, but this is not intended to be used for emergencies.    Patient Education:  Risks of stimulant medication include, but not limited to, decreased appetite, risk of tics (and that they may be lasting), trouble sleeping, cardiac risks such as increased heart rate and blood pressure, and rare risk of sudden cardiac death.  Also  "risk of addiction/tolerance/dependence.    Get Out of Your Mind & Into Your Life   By Lance Love    The Happiness Trap: How to Stop Struggling and Start Living  By Monster Ambrosio    The Reality Slap: Finding Peace & Fulfillment When Life Hurts  By Monster Ambrosio    Things Might Go Terribly, Horribly Wrong: A Guide to Life Liberated from Anxiety  By Venice Dykes, PhD    Stress Less, Live More: How Acceptance and Commitment Therapy Can Help You Live a Busy Yet Balanced Life  By Germain Whitlock    Finding Life Beyond Trauma: Using Acceptance and Commitment Therapy to Heal From Post-Traumatic Stress and Trauma-Related Problems  By Ericka Freeman and Chelsea Bagley    Other ACT Skills References     Monster Ambrosio on YouTube - Demonstrates several different skills to deal with difficult thoughts and feelings     Book:  \"A Liberated Mind: How to Pivot Toward What Matters\" by Lance Love     Psychological flexibility: How love turns pain into purpose  Tedx Talk by Dr. Love discussing his struggle with anxiety and panic disorder which motivated him to develop ACT therapy (Acceptance and Commitment Therapy)     You Are Not Your Thoughts      THE DIALECTICAL BEHAVIOR THERAPY SKILLS WORKBOOK  By Stanislaw Alvarado and Brantley    DBT Skills Training Manual, Second Edition.  By Ayanna Leslie, 2014.    DBT Skills Training Handouts and Worksheets, Second Edition.  By Ayanna Leslie, 2014.    Building a Life [place] Living: A Memoir 2020.  By Ayanna Leslie, 2020.    Community Resources:    National Suicide Prevention Lifeline: 723.529.9334 (TTY: 417.161.5077). Call anytime for help.  (www.suicidepreventionlifeline.org)  National [place] on Mental Illness (www.terence.org): 233-684-5242 or 873-656-7554.   Mental Health Association (www.mentalhealth.org): 800.173.7621 or 693-704-7354.  [place] Crisis Text Line: Text [place] to 951087  Suicide LifeLine Chat: suicidepreventionlifeline.org/chat  "

## 2022-01-17 NOTE — PROGRESS NOTES
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 also be contributing to restless leg symptoms.  In place of trazodone she will start a trial with clonidine to take at bedtime.  Could consider dosing clonidine twice daily or could consider guanfacine as an alternative.  She is also agreeable to start a very slow taper of lamotrigine since it is unclear how helpful this medication has been and would be nice to decrease psychiatric polypharmacy.  She is encouraged to consider individual psychotherapy.  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  No acute safety concerns or SI.    Medication side effects and alternatives reviewed. Health promotion activities recommended and reviewed today. All questions addressed. Education and counseling completed regarding risks and benefits of medications and psychotherapy options. Recommend therapy for additional support.     Treatment Plan:    Continue ropinirole and gabapentin as prescribed by sleep medicine/pcp.     Continue Adderall immediate release 10 mg daily as needed for ADHD.      Continue trazodone 50 mg at bedtime as needed for sleep. Can decrease to 25 mg at bedtime (and even trial discontinuation) if other changes going well.     Decrease duloxetine/Cymbalta to 90 mg daily for anxiety, mood. Can alternate 90 mg and 120 m g dose every other day/every few days to slow  down taper as needed.     Decrease lamotrigine to 200 mg daily for mood/augmentation to duloxetine. We will likely continue to taper this medication over time.     Start clonidine 0.1 mg at bedtime for ADHD/irritability/impulsivity.      Recommend individual psychotherapy for additional support and ongoing development of nonpharmacologic coping skills and strategies.  Discussed DBT and ACT therapies.    Continue all other cares per primary care provider.     Continue all other medications as reviewed per electronic medical record today.     Safety plan reviewed. To the Emergency Department as needed or call after hours crisis line at [phone] or [phone]. [place] Crisis Text Line: Text [place] to 907799  or  Suicide LifeLine Chat: [email or web address].org/chat    Schedule an appointment with me in 4 weeks or sooner as needed.  Call [facility] at [phone] to schedule.    Follow up with primary care provider as planned or sooner if needed for acute medical concerns.    Call the psychiatric nurse line with medication questions or concerns at [phone].    [employer] may be used to communicate with your provider, but this is not intended to be used for emergencies.    Patient Education:  Risks of stimulant medication include, but not limited to, decreased appetite, risk of tics (and that they may be lasting), trouble sleeping, cardiac risks such as increased heart rate and blood pressure, and rare risk of sudden cardiac death.  Also risk of addiction/tolerance/dependence.    Get Out of Your Mind & Into Your Life   By Lance Love    The Happiness Trap: How to Stop Struggling and Start Living  By Monster Ambrosio    The Reality Slap: Finding Peace & Fulfillment When Life Hurts  By Monster Ambrosio    Things Might Go Terribly, Horribly Wrong: A Guide to Life Liberated from Anxiety  By Venice Dykes, PhD    Stress Less, Live More: How Acceptance and Commitment Therapy Can Help You Live a Busy Yet  "Balanced Life  By Germain Whitlock    Finding Life Beyond Trauma: Using Acceptance and Commitment Therapy to Heal From Post-Traumatic Stress and Trauma-Related Problems  By Ericka Freeman and Chelsea Bagley    Other ACT Skills References     Monster Ambrosio on YouTube - Demonstrates several different skills to deal with difficult thoughts and feelings     Book:  \"A Liberated Mind: How to Pivot Toward What Matters\" by Lance Love     Psychological flexibility: How love turns pain into purpose  Tedx Talk by Dr. Love discussing his struggle with anxiety and panic disorder which motivated him to develop ACT therapy (Acceptance and Commitment Therapy)     You Are Not Your Thoughts      THE DIALECTICAL BEHAVIOR THERAPY SKILLS WORKBOOK  By Stanislaw Alvarado and Brantley    DBT Skills Training Manual, Second Edition.  By Ayanna Leslie, 2014.    DBT Skills Training Handouts and Worksheets, Second Edition.  By Ayanna Leslie, 2014.    Building a Life [place] Living: A Memoir 2020.  By Ayanna Leslie, 2020.    Community Resources:    National Suicide Prevention Lifeline: 207.354.1849 (TTY: 837.675.9738). Call anytime for help.  (www.suicidepreventionlifeline.org)  National [place] on Mental Illness (www.terence.org): 205.181.9784 or 077-004-5675.   Mental Health Association (www.mentalhealth.org): 580.753.5860 or 727-682-7144.  [place] Crisis Text Line: Text [place] to 510771  Suicide LifeLine Chat: suicideThe Original [email or web address].[email or web address]/chat    Administrative Billing:   Phone Call/Video Duration: 56 Minutes  Start: 9:26a  Stop: 10:22a    Plus 10 minutes for chart and laboratory review.     66 minutes total visit .    Complexity of Care: Patient with multiple psychiatric diagnoses and multiple medication changes adding to complexity of care.    Patient Status:  Patient will continue to be seen for ongoing consultation and stabilization.    Signed:   Marie Valentine DO  [facility]    Disclaimer: This note consists of symbols " derived from keyboarding, dictation and/or voice recognition software. As a result, there may be errors in the script that have gone undetected. Please consider this when interpreting information found in this chart.

## 2022-01-17 NOTE — Clinical Note
Please call this patient to get them scheduled for a follow-up visit in 4 weeks. Please schedule with me and the [facility] [place], Dr. Olsen. Thanks!

## 2022-01-17 NOTE — PROGRESS NOTES
"[facility] Psychiatry Service  Provider Name:  Ervin Olsen     Credentials:  ALISIA Torres    PATIENT'S NAME: Zari Francisco  PREFERRED NAME: Zari  PRONOUNS: she/her/hers     MRN: [id number]  : 1960  ADDRESS: [facility] [place] [address]  ACCT. NUMBER:  [id number]  DATE OF SERVICE: 22  START TIME: 08:30am  END TIME: 09:10am  PREFERRED PHONE: [phone]  May we leave a program related message: Yes  SERVICE MODALITY:  Video Visit:      Provider verified identity through the following two step process.  Patient provided:  Patient     Telemedicine Visit: The patient's condition can be safely assessed and treated via synchronous audio and visual telemedicine encounter.      Reason for Telemedicine Visit: Services only offered telehealth    Originating Site (Patient Location): Patient's home    Distant Site (Provider Location): Provider Remote Setting- Home Office    Consent:  The patient/guardian has verbally consented to: the potential risks and benefits of telemedicine (video visit) versus in person care; bill my insurance or make self-payment for services provided; and responsibility for payment of non-covered services.     Patient would like the video invitation sent by:  My Chart    Mode of Communication:  Video Conference via eMeter    As the provider I attest to compliance with applicable laws and regulations related to telemedicine.    UNIVERSAL ADULT Mental Health DIAGNOSTIC ASSESSMENT    Identifying Information:  Patient is a 61 year old,   .  The pronoun use throughout this assessment reflects the patient's chosen pronoun.  Patient was referred for an assessment by  self.  Patient attended the session alone.    Chief Complaint:   The reason for seeking services at this time is: \"Anxiety\". Longstanding problem with anxiety and episodes of explosive anger toward others. Now has a restraining order from [employer] from an incident 2 months ago. Struggling " "with restless legs interfering with sleep. Was treated at [facility] until psychiatrist retired and now establishing with [facility]. The problem(s) began 89.    Patient has attempted to resolve these concerns in the past through medication and psychotherapy.    Social/Family History:  Patient reported they grew up in other [place].  They were raised by biological parents  .  Parents were always together. Has 2 younger sisters and a younger brother. Patient reported that their childhood was \"fine. We were poor but it was fine\". Dropped out of school at 14, smoking pot and drinking. Patient described their current relationships with family of origin as good with her mother; father is . She maintains a good relationship with her sisters; brother has isolated from the family.     The patient describes their cultural background as .  Cultural influences and impact on patient's life structure, values, norms, and healthcare: None.  Contextual influences on patient's health include: Individual Factors transitioning care from one psychiatrist to another.    These factors will be addressed in the Preliminary Treatment plan. Patient identified their preferred language to be English. Patient reported they does not need the assistance of an  or other support involved in therapy.     Patient reported had no significant delays in developmental tasks.   Patient's highest education level was associate degree / vocational certificate  .  Patient identified the following learning problems: some behavioral problems, dropped out of school at 14..  Modifications will not be used to assist communication in therapy. Patient reports they are  able to understand written materials.    Patient reported the following relationship history; 1 marriage.  Patient's current relationship status is  for 40 years.   Patient identified their sexual orientation as heterosexual.  Patient reported having 2 child(yenifer). " Patient identified parents; mother; siblings; adult child; friends; spouse as part of their support system.  Patient identified the quality of these relationships as stable and meaningful. 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     Patient's current living/housing situation involves staying in own home/apartment.  The immediate members of family and household include Suhas Francisco, 65,Spouse and they report that housing is stable.    Patient is currently employed part time.  Patient reports their finances are obtained through employment; spouse. Patient does not identify finances as a current stressor.      Patient reported that they have been involved with the legal system; restraining order at [employer] 2 months ago. DUI in 1990. Patient does not report being under probation/ parole/ jurisdiction. They are not under any current court jurisdiction. .    Patient's Strengths and Limitations:  Patient identified the following strengths or resources that will help them succeed in treatment: commitment to health and well being, community involvement, exercise routine, friends / good social support, family support, insight, intelligence, positive work environment, motivation, sober support group / recovery support , strong social skills and work ethic. Things that may interfere with the patient's success in treatment include: none identified.     Assessments:  PHQ9:   PHQ-9 SCORE 1/18/2011 3/9/2011 12/20/2011 11/8/2013 1/16/2022 1/16/2022   PHQ-9 Total Score 9 3 4 3 - -   PHQ-9 Total Score [facility] - - - - - 4 (Minimal depression)   PHQ-9 Total Score - - - - 4 4     GAD2:   PUSHPA-2 1/13/2022   Feeling nervous, anxious, or on edge 1   Not being able to stop or control worrying 1   PUSHPA-2 Total Score 2     GAD7:   PUSHPA-7 SCORE 12/20/2011 11/8/2013   Total Score 5 8     CAGE-AID:   CAGE-AID Total Score 1/12/2022 1/12/2022   Total Score 0 0   Total Score MyChart -  0 (A total score of 2 or greater is considered clinically significant)       Stonewall Suicide Severity Rating Scale (Lifetime/Recent)  Stonewall Suicide Severity Rating (Lifetime/Recent) 1/17/2022   1. Wish to be Dead (Lifetime) No   1. Wish to be Dead (Recent) No   2. Non-Specific Active Suicidal Thoughts (Lifetime) No   2. Non-Specific Active Suicidal Thoughts (Recent) No   3. Active Suicidal Ideation with any Methods (Not Plan) Without Intent to Act (Lifetime) No   3. Active Suicidal Ideation with any Methods (Not Plan) Without Intent to Act (Recent) No   4. Active Suicidal Ideation with Some Intent to Act, Without Specific Plan (Lifetime) No   4. Active Suicidal Ideation with Some Intent to Act, Without Specific Plan (Recent) No   5. Active Suicidal Ideation with Specific Plan and Intent (Lifetime) No   5. Active Suicidal Ideation with Specific Plan and Intent (Recent) No   Actual Attempt (Lifetime) No   Actual Attempt (Past 3 Months) No   Has subject engaged in non-suicidal self-injurious behavior? (Lifetime) No   Has subject engaged in non-suicidal self-injurious behavior? (Past 3 Months) No       Personal and Family Medical History:  Patient does report a family history of mental health concerns.  Patient reports family history includes Alcohol/Drug in an other family member; Allergies in her mother; Arthritis in her mother and sister; Breast Cancer in her maternal grandmother; Cancer in her father, maternal aunt, and maternal grandmother; Heart Disease in her paternal grandfather; Lipids in her father; Obesity in her mother; Other Cancer in her father..     Patient does report Mental Health Diagnosis and/or Treatment.  Patient Patient reported the following previous diagnoses which include(s): ADHD; an anxiety disorder .  Patient reported symptoms began in late adolescence.  Patient has received mental health services in the past:  therapy; psychiatry  .  Psychiatric Hospitalizations: none when   ,  ,  ,  ,   ,  ,  ,  ,  ,  ,  .  Patient denies a history of civil commitment.  Currently, patient none  receiving other mental health services.  These include none.       Patient has had a physical exam to rule out medical causes for current symptoms.  Date of last physical exam was within the past year. Client was encouraged to follow up with PCP if symptoms were to develop. The patient has a [facility] Primary Care Provider, who is named Susanna Dyer.  Patient reports no current medical concerns.  Patient denies any issues with pain..   There are not significant appetite / nutritional concerns / weight changes.   Patient does report a history of head injury / trauma / cognitive impairment. 1 LOC in 1983    Patient reports current meds as:   Outpatient Medications Marked as Taking for the 1/17/22 encounter (Virtual Visit) with Mike Olsen PsyD   Medication Sig     amphetamine-dextroamphetamine (ADDERALL) 10 MG per tablet Take 10 mg by mouth daily      DULoxetine (CYMBALTA) 60 MG capsule Take 120 mg by mouth daily      gabapentin (NEURONTIN) 100 MG capsule Take 1 capsule along with gabapentin 300 mg capsule= 400 mg at bedtime     gabapentin (NEURONTIN) 300 MG capsule Take 300 mg capsule plus 100 mg capsule to = 400 mg at bedtime     lamoTRIgine (LAMICTAL) 100 MG tablet Take 200 mg by mouth daily      traZODone (DESYREL) 50 MG tablet Take 50 mg by mouth At Bedtime       Medication Adherence:  Patient reports taking.  taking prescribed medications as prescribed.    Patient Allergies:    Allergies   Allergen Reactions     Metal [Staples]      Sudafed [Fd&C Red #40-Fd&C Yellow #6-Pse]      Sympathomimetics      Clariten D     Contrast Dye Itching     Isovue 370-CT contrast. Very small area of itchiness after contrast injection       Medical History:    Past Medical History:   Diagnosis Date     Anxiety      Bell palsy      Cervical high risk HPV (human papillomavirus) test positive 05/06/2019    See problem list      S/P MARYLIN     still has cervix     Seasonal allergies          Current Mental Status Exam:   Appearance:  Appropriate    Eye Contact:  Good   Psychomotor:  Normal       Gait / station:  no problem  Attitude / Demeanor: Cooperative   Speech      Rate / Production: Normal/ Responsive      Volume:  Normal  volume      Language:  intact  Mood:   Normal  Affect:   Appropriate    Thought Content: Clear   Thought Process: Tangential       Associations: No loosening of associations  Insight:   Good   Judgment:  Intact   Orientation:  Person  Attention/concentration: Fair      Substance Use:  Patient did report a family history of substance use concerns; see medical history section for details.  Patient has received chemical dependency treatment in the past at [facility].  Patient has ever been to detox.      Patient is not currently receiving any chemical dependency treatment.           Substance History of use Age of first use Date of last use     Pattern and duration of use (include amounts and frequency)   Alcohol used in the past   15 05/12/91 REPORTS SUBSTANCE USE: N/A   Cannabis   currently use 14 01/01/22 REPORTS SUBSTANCE USE: reports using substance 1 times per week and has 1   at a time.   Patient reports heaviest use was teenage/early 20s.     Amphetamines   never used     REPORTS SUBSTANCE USE: N/A   Cocaine/crack    never used       REPORTS SUBSTANCE USE: N/A   Hallucinogens never used         REPORTS SUBSTANCE USE: N/A   Inhalants never used         REPORTS SUBSTANCE USE: N/A   Heroin never used         REPORTS SUBSTANCE USE: N/A   Other Opiates never used     REPORTS SUBSTANCE USE: N/A   Benzodiazepine   never used     REPORTS SUBSTANCE USE: N/A   Barbiturates never used     REPORTS SUBSTANCE USE: N/A   Over the counter meds never used     REPORTS SUBSTANCE USE: N/A   Caffeine currently use 15   REPORTS SUBSTANCE USE: reports using substance 3 times per day and has 8 ounces at a time.   Patient reports  heaviest use was several years ago.   Nicotine  used in the past 14 01/12/15 REPORTS SUBSTANCE USE: N/A   Other substances not listed above:  Identify:  never used     REPORTS SUBSTANCE USE: N/A     Patient reported the following problems as a result of their substance use: no problems, not applicable.    Substance Use: No symptoms    Based on the negative CAGE score and clinical interview there  are not indications of drug or alcohol abuse.      Significant Losses / Trauma / Abuse / Neglect Issues:   Patient did not serve in the .  There are indications or report of significant loss, trauma, abuse or neglect issues related to: are no indications and client denies any losses, trauma, abuse, or neglect concerns.  Concerns for possible neglect are not present.    Safety Assessment:   Patient denies current homicidal ideation and behaviors.  Patient denies current self-injurious ideation and behaviors.    Patient denied risk behaviors associated with substance use.  Patient reported outbursts of intense anger associated with mental health symptoms.  Patient reports the following current concerns for their personal safety: None.  Patient reports there are firearms in the house.     yes, they are secured.  .    History of Safety Concerns:  Patient denied a history of homicidal ideation.     Patient denied a history of personal safety concerns.    Patient denied a history of assaultive behaviors.    Patient denied a history of sexual assault behaviors.     Patient reported a history unsafe motor vehicle operation reported a history of placing themselves in unsafe environment(s) associated with substance use.  Patient reported a history of impulsive decision making reported a history of outbursts of anger associated with mental health symptoms.  Patient reports the following protective factors: forward or future oriented thinking; dedication to family or friends; safe and stable environment; regular sleep; regular  physical activity; sense of belonging; purpose; secure attachment; help seeking behaviors when distressed; adherence with prescribed medication; living with other people; commitment to well being; sense of meaning; positive social skills; healthy fear of risky behaviors or pain; financial stability; strong sense of self worth or esteem; sense of personal control or determination    Risk Plan:  See Recommendations for Safety and Risk Management Plan    Review of Symptoms per patient report:  Depression: No symptoms  Sharmila:  No Symptoms  Psychosis: No Symptoms  Anxiety: Excessive worry, Nervousness, Physical complaints, such as headaches, stomachaches, muscle tension, Sleep disturbance, Psychomotor agitation, Ruminations, Poor concentration, Irritability and Anger outbursts  Panic:  Palpitations, Tremors, Shortness of breath, Tingling, Numbness and Sense of impending doom  Post Traumatic Stress Disorder:  No Symptoms   Eating Disorder: No Symptoms  ADD / ADHD:  Inattentive, Difficulties listening, Poor task completion, Poor organizational skills, Distractibility, Forgetful, Interrupts, Intrudes and Impulsive  Conduct Disorder: No symptoms  Autism Spectrum Disorder: No symptoms  Obsessive Compulsive Disorder: No Symptoms    Patient reports the following compulsive behaviors and treatment history:  .      Sleep: restless legs; in bed 6-7 hours. Falls asleep fine but wakes up. No napping.  Cannabis: 1x per week, weekends  Alcohol: none, sober 32 years  Tobacco: social smoer      Diagnostic Criteria:   Generalized Anxiety Disorder  A. Excessive anxiety and worry about a number of events or activities (such as work or school performance).   B. The person finds it difficult to control the worry.  C. Select 3 or more symptoms (required for diagnosis). Only one item is required in children.   - Restlessness or feeling keyed up or on edge.    - Being easily fatigued.    - Difficulty concentrating or mind going blank.    -  Irritability.    - Muscle tension.    - Sleep disturbance (difficulty falling or staying asleep, or restless unsatisfying sleep).   D. The focus of the anxiety and worry is not confined to features of an Axis I disorder.  E. The anxiety, worry, or physical symptoms cause clinically significant distress or impairment in social, occupational, or other important areas of functioning.   F. The disturbance is not due to the direct physiological effects of a substance (e.g., a drug of abuse, a medication) or a general medical condition (e.g., hyperthyroidism) and does not occur exclusively during a Mood Disorder, a Psychotic Disorder, or a Pervasive Developmental Disorder. Attention Deficit Hyperactivity Disorder  A) A persistent pattern of inattention and/or hyperactivity-impulsivity that interferes with functioning or development, as characterized by (1) Inattention and/or (2) Hyperactivity and Impulsivity  (1) Inattention: 6 or more of the following symptoms have persisted for at least 6 months to a degree that is inconsistent with developmental level and that negatively impacts directly on social and academic/occupational activities:  - Often fails to give close attention to details or makes careless mistakes in schoolwork, at work, or during other activities  - Often has difficulty sustaining attention in tasks or play activities  - Often does not seem to listen when spoken to directly  - Often does not follow through on instructions and fails to finish schoolwork, chores, or duties in the workplace  - Often has difficulty organizing tasks and activities  - Often avoids, dislikes, or is reluctant to engage in tasks that require sustained mental effort  - Often loses things necessary for tasks or activities  - Is often easily distractedby extraneous stimuli  - Is often forgetful in daily activities  (2) Hyeractivity and Impulsivity: 6 or more of the following symptoms have persisted for at least 6 months to a degree  "that is inconsistent with developmental level and that negatively impacts directly on social and academic/occupational activities:  - Often fidgets with or taps hands or feet or squirms in seat  - Often leaves seat in situations when remaining seated is expected  - Often runs about or climbs in situationswhere it is inappropriate  - Often unable to play or engage in leisure activities quietly  - Is often \"on the go,\" acting as if \"driven by a motor\"  - Often talks excessively  - Often blurts out an answer before a question has been completed  - Often has difficulty waiting his or her turn  - Often interrupts or intrudes on others  B) Several inattentive or hyperactive-impulsive symptoms were present prior to age 11 years  C) Several inattentive or hyperactive-impulsive symptoms are present in two or more settings  D) There is clear evidence that the symptoms interfere with, or reduce the quality of, social academic, or occupational functioning  E) The Symptoms do not occur exclusively during the course of schizophrenia or another psychotic disorder and are not better explained by another mental disorder    Functional Status:  Patient reports the following functional impairments:  relationship(s) and social interactions.     Nonprogrammatic care:  Patient is requesting basic services to address current mental health concerns.    Clinical Summary:  1. Reason for assessment: evaluation for medication and therapy   .  2. Psychosocial, Cultural and Contextual Factors: transitioning psychiatric care from [facility] to [place]  .  3. Principal DSM5 Diagnoses  (Sustained by DSM5 Criteria Listed Above):   Attention-Deficit/Hyperactivity Disorder  314.01 (F90.9) Unspecified Attention -Deficit / Hyperactivity Disorder  300.02 (F41.1) Generalized Anxiety Disorder.  4. 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  5. " Provisional Diagnosis: R/0 301.83 (F60.3) Borderline Personality Disorder and 301.81 (F60.81) Narcissistic Personaltiy Disorder .  6. Prognosis: Expect Improvement and Relieve Acute Symptoms.  7. Likely consequences of symptoms if not treated: further deterioration of functioning.  8. Client strengths include:  caring, committed to sobriety, creative, educated, empathetic, employed, goal-focused, good listener, has a previous history of therapy, insightful, intelligent, motivated, open to learning, open to suggestions / feedback, responsible parent, support of family, friends and providers, supportive, wants to learn, willing to ask questions, willing to relate to others and work history .     Recommendations:     1. Plan for Safety and Risk Management:   Recommended that patient call 911 or go to the local ED should there be a change in any of these risk factors..          Report to child / adult protection services was NA.     2. Patient's identified no significant cultural/Protestant factors that would interfere with her care.     3. Initial Treatment will focus on:    Anxiety - complete medication evaluation and consider proposed therapy/treatment options  Anger Management - complete medication evaluation and consider proposed therapy/treatment options.     4. Resources/Service Plan:    services are not indicated.   Modifications to assist communication are not indicated.   Additional disability accommodations are not indicated.      5. Collaboration:   Collaboration / coordination of treatment will be initiated with the following  support professionals: psychiatry.      6.  Referrals:   The following referral(s) will be initiated: TBD after consultation with psychiatry. Next Scheduled Appointment: TBD.     A Release of Information has been obtained for the following: n/a.    7. MANOLO:    MANOLO:  Discussed the general effects of drugs and alcohol on health and well-being. Provider gave patient printed  information about the effects of chemical use on their health and well being. Recommendations:  Maintain sobriety .     8. Records:   These were reviewed at time of assessment.   Information in this assessment was obtained from the medical record and  provided by patient who is a fair historian.    Patient will have open access to their mental health medical record.        Provider Name/ Credentials:  Ervin Olsen PsyD, ALISIA  January 17, 2022

## 2022-01-19 ENCOUNTER — OFFICE VISIT (OUTPATIENT)
Dept: ORTHOPEDICS | Facility: CLINIC | Age: 62
End: 2022-01-19
Payer: COMMERCIAL

## 2022-01-19 VITALS — BODY MASS INDEX: 23.82 KG/M2 | HEIGHT: 65 IN | WEIGHT: 143 LBS

## 2022-01-19 DIAGNOSIS — M51.16 LUMBAR DISC HERNIATION WITH RADICULOPATHY: ICD-10-CM

## 2022-01-19 DIAGNOSIS — Z91.041 CONTRAST MEDIA ALLERGY: ICD-10-CM

## 2022-01-19 DIAGNOSIS — M54.16 LUMBAR RADICULAR PAIN: Primary | ICD-10-CM

## 2022-01-19 PROCEDURE — 99214 OFFICE O/P EST MOD 30 MIN: CPT | Performed by: PREVENTIVE MEDICINE

## 2022-01-19 RX ORDER — METHYLPREDNISOLONE 16 MG/1
TABLET ORAL
Qty: 4 TABLET | Refills: 0 | Status: SHIPPED | OUTPATIENT
Start: 2022-01-19 | End: 2022-07-19

## 2022-01-19 RX ORDER — IBUPROFEN 800 MG/1
800 TABLET, FILM COATED ORAL EVERY 8 HOURS PRN
Qty: 60 TABLET | Refills: 1 | Status: SHIPPED | OUTPATIENT
Start: 2022-01-19 | End: 2023-06-13

## 2022-01-19 ASSESSMENT — MIFFLIN-ST. JEOR: SCORE: 1209.52
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
## 2022-01-21 ENCOUNTER — ANCILLARY PROCEDURE (OUTPATIENT)
Dept: GENERAL RADIOLOGY | Facility: CLINIC | Age: 62
End: 2022-01-21
Attending: PREVENTIVE MEDICINE
Payer: COMMERCIAL

## 2022-01-21 DIAGNOSIS — M51.16 LUMBAR DISC HERNIATION WITH RADICULOPATHY: ICD-10-CM

## 2022-01-21 PROCEDURE — 62323 NJX INTERLAMINAR LMBR/SAC: CPT | Performed by: RADIOLOGY

## 2022-01-21 PROCEDURE — A9585 GADOBUTROL INJECTION: HCPCS | Performed by: RADIOLOGY

## 2022-01-21 RX ORDER — GADOBUTROL 604.72 MG/ML
0.1 INJECTION INTRAVENOUS ONCE
Status: COMPLETED | OUTPATIENT
Start: 2022-01-21 | End: 2022-01-21

## 2022-01-21 RX ORDER — METHYLPREDNISOLONE ACETATE 80 MG/ML
80 INJECTION, SUSPENSION INTRA-ARTICULAR; INTRALESIONAL; INTRAMUSCULAR; SOFT TISSUE ONCE
Status: COMPLETED | OUTPATIENT
Start: 2022-01-21 | End: 2022-01-21

## 2022-01-21 RX ORDER — BUPIVACAINE HYDROCHLORIDE 5 MG/ML
5 INJECTION, SOLUTION EPIDURAL; INTRACAUDAL ONCE
Status: COMPLETED | OUTPATIENT
Start: 2022-01-21 | End: 2022-01-21

## 2022-01-21 RX ORDER — IOPAMIDOL 408 MG/ML
10 INJECTION, SOLUTION INTRATHECAL ONCE
Status: ACTIVE | OUTPATIENT
Start: 2022-01-21

## 2022-01-21 RX ADMIN — BUPIVACAINE HYDROCHLORIDE 10 MG: 5 INJECTION, SOLUTION EPIDURAL; INTRACAUDAL at 09:50

## 2022-01-21 RX ADMIN — GADOBUTROL 2 ML: 604.72 INJECTION INTRAVENOUS at 09:49

## 2022-01-21 RX ADMIN — METHYLPREDNISOLONE ACETATE 80 MG: 80 INJECTION, SUSPENSION INTRA-ARTICULAR; INTRALESIONAL; INTRAMUSCULAR; SOFT TISSUE at 09:49

## 2022-01-27 ENCOUNTER — TELEPHONE (OUTPATIENT)
Dept: FAMILY MEDICINE | Facility: CLINIC | Age: 62
End: 2022-01-27
Payer: COMMERCIAL

## 2022-01-27 NOTE — TELEPHONE ENCOUNTER
gabapentin (NEURONTIN) 100 MG capsule 21 capsule 0 1/4/2022     PA is needed for medication. Plan does not cover this medication. Please call plan at [phone] to initiate PA or call/fax pharmacy to change medication. Patient ID # is [id number].

## 2022-01-28 NOTE — TELEPHONE ENCOUNTER
Central Prior Authorization Team   Phone: [phone]    PA Initiation    Medication: gabapentin (NEURONTIN) 100 MG [id number]/4/2022  Insurance Company: [employer] - Phone [phone] Fax [phone]  Pharmacy Filling the Rx: [id number] - [place], [place] - [address]  Filling Pharmacy Phone: [phone]  Filling Pharmacy Fax:    Start Date: 1/28/2022

## 2022-01-31 NOTE — TELEPHONE ENCOUNTER
Prior Authorization Not Needed per Insurance    Medication: gabapentin (NEURONTIN) 100 MG [id number]/4/2022  Insurance Company: [employer] - Phone [phone] Fax [phone]  Expected CoPay:      Pharmacy Filling the Rx: [id number] - [place], [place] - [address]  Pharmacy Notified:  y  Patient Notified:  y    Pharmacy has a paid claim.  Closing encounter.

## 2022-02-01 ENCOUNTER — LAB (OUTPATIENT)
Dept: URGENT CARE | Facility: URGENT CARE | Age: 62
End: 2022-02-01
Attending: FAMILY MEDICINE
Payer: COMMERCIAL

## 2022-02-01 ENCOUNTER — MYC MEDICAL ADVICE (OUTPATIENT)
Dept: PSYCHIATRY | Facility: CLINIC | Age: 62
End: 2022-02-01

## 2022-02-01 DIAGNOSIS — Z20.822 SUSPECTED 2019 NOVEL CORONAVIRUS INFECTION: ICD-10-CM

## 2022-02-01 LAB — SARS-COV-2 RNA RESP QL NAA+PROBE: NEGATIVE

## 2022-02-01 PROCEDURE — U0003 INFECTIOUS AGENT DETECTION BY NUCLEIC ACID (DNA OR RNA); SEVERE ACUTE RESPIRATORY SYNDROME CORONAVIRUS 2 (SARS-COV-2) (CORONAVIRUS DISEASE [COVID-19]), AMPLIFIED PROBE TECHNIQUE, MAKING USE OF HIGH THROUGHPUT TECHNOLOGIES AS DESCRIBED BY CMS-2020-01-R: HCPCS

## 2022-02-01 PROCEDURE — U0005 INFEC AGEN DETEC AMPLI PROBE: HCPCS

## 2022-02-02 ENCOUNTER — MYC MEDICAL ADVICE (OUTPATIENT)
Dept: PSYCHIATRY | Facility: CLINIC | Age: 62
End: 2022-02-02
Payer: COMMERCIAL

## 2022-02-07 ENCOUNTER — MYC MEDICAL ADVICE (OUTPATIENT)
Dept: PSYCHIATRY | Facility: CLINIC | Age: 62
End: 2022-02-07
Payer: COMMERCIAL

## 2022-02-07 DIAGNOSIS — F98.8 ATTENTION DEFICIT DISORDER (ADD) WITHOUT HYPERACTIVITY: ICD-10-CM

## 2022-02-07 DIAGNOSIS — F41.1 GENERALIZED ANXIETY DISORDER: ICD-10-CM

## 2022-02-08 ENCOUNTER — TELEPHONE (OUTPATIENT)
Dept: FAMILY MEDICINE | Facility: CLINIC | Age: 62
End: 2022-02-08
Payer: COMMERCIAL

## 2022-02-08 RX ORDER — CLONIDINE HYDROCHLORIDE 0.1 MG/1
0.1 TABLET ORAL AT BEDTIME
Qty: 30 TABLET | Refills: 1 | Status: SHIPPED | OUTPATIENT
Start: 2022-02-08 | End: 2022-05-18

## 2022-02-08 RX ORDER — LAMOTRIGINE 100 MG/1
100 TABLET ORAL 2 TIMES DAILY
Qty: 180 TABLET | Refills: 0 | Status: SHIPPED | OUTPATIENT
Start: 2022-02-08 | End: 2022-05-10

## 2022-02-08 NOTE — CONFIDENTIAL NOTE
Can RN reach out to pharmacy since there appears to be refills that could be transferred to [place]? Or see what needs to be done? I don't prescribe pt's requip so just need to look into clonidine it sounds like? Please let pt know we are looking into it. Thanks.

## 2022-02-08 NOTE — TELEPHONE ENCOUNTER
From patient's message, it looks like she was able to refill Requip  If is not the case, let me know

## 2022-02-08 NOTE — TELEPHONE ENCOUNTER
Please review patient reply. RN Will also route to patient's pcp for her Requip. From Dr. Valentine patient needs Clonidine and Lamotrigine.

## 2022-02-08 NOTE — TELEPHONE ENCOUNTER
Writer called patients cell phone and left a brief message and left our number if there is anything she needs.

## 2022-02-25 ASSESSMENT — ANXIETY QUESTIONNAIRES
7. FEELING AFRAID AS IF SOMETHING AWFUL MIGHT HAPPEN: NOT AT ALL
7. FEELING AFRAID AS IF SOMETHING AWFUL MIGHT HAPPEN: NOT AT ALL
3. WORRYING TOO MUCH ABOUT DIFFERENT THINGS: NOT AT ALL
1. FEELING NERVOUS, ANXIOUS, OR ON EDGE: MORE THAN HALF THE DAYS
GAD7 TOTAL SCORE: 6
5. BEING SO RESTLESS THAT IT IS HARD TO SIT STILL: SEVERAL DAYS
GAD7 TOTAL SCORE: 6
GAD7 TOTAL SCORE: 6
6. BECOMING EASILY ANNOYED OR IRRITABLE: SEVERAL DAYS
4. TROUBLE RELAXING: SEVERAL DAYS
2. NOT BEING ABLE TO STOP OR CONTROL WORRYING: SEVERAL DAYS

## 2022-02-26 ASSESSMENT — ANXIETY QUESTIONNAIRES: GAD7 TOTAL SCORE: 6

## 2022-02-28 ENCOUNTER — VIRTUAL VISIT (OUTPATIENT)
Dept: PSYCHIATRY | Facility: CLINIC | Age: 62
End: 2022-02-28
Payer: COMMERCIAL

## 2022-02-28 ENCOUNTER — VIRTUAL VISIT (OUTPATIENT)
Dept: PSYCHOLOGY | Facility: CLINIC | Age: 62
End: 2022-02-28
Payer: COMMERCIAL

## 2022-02-28 DIAGNOSIS — F10.11 ALCOHOL ABUSE, IN REMISSION: ICD-10-CM

## 2022-02-28 DIAGNOSIS — F41.1 GENERALIZED ANXIETY DISORDER: Primary | ICD-10-CM

## 2022-02-28 DIAGNOSIS — F90.9 ATTENTION DEFICIT HYPERACTIVITY DISORDER (ADHD), UNSPECIFIED ADHD TYPE: ICD-10-CM

## 2022-02-28 DIAGNOSIS — F98.8 ATTENTION DEFICIT DISORDER (ADD) WITHOUT HYPERACTIVITY: ICD-10-CM

## 2022-02-28 DIAGNOSIS — Z86.59 HX OF MAJOR DEPRESSION: ICD-10-CM

## 2022-02-28 DIAGNOSIS — F41.9 ANXIETY: Primary | ICD-10-CM

## 2022-02-28 PROCEDURE — 90832 PSYTX W PT 30 MINUTES: CPT | Mod: 95 | Performed by: PSYCHOLOGIST

## 2022-02-28 PROCEDURE — 99214 OFFICE O/P EST MOD 30 MIN: CPT | Mod: 95 | Performed by: PSYCHIATRY & NEUROLOGY

## 2022-02-28 RX ORDER — DEXTROAMPHETAMINE SACCHARATE, AMPHETAMINE ASPARTATE, DEXTROAMPHETAMINE SULFATE AND AMPHETAMINE SULFATE 2.5; 2.5; 2.5; 2.5 MG/1; MG/1; MG/1; MG/1
10 TABLET ORAL DAILY
Qty: 30 TABLET | Refills: 0 | Status: SHIPPED | OUTPATIENT
Start: 2022-02-28 | End: 2022-05-13

## 2022-02-28 RX ORDER — TRAZODONE HYDROCHLORIDE 50 MG/1
50 TABLET, FILM COATED ORAL AT BEDTIME
Qty: 90 TABLET | Refills: 1 | Status: SHIPPED | OUTPATIENT
Start: 2022-02-28 | End: 2022-09-29 | Stop reason: ALTCHOICE

## 2022-02-28 ASSESSMENT — PATIENT HEALTH QUESTIONNAIRE - PHQ9
SUM OF ALL RESPONSES TO PHQ QUESTIONS 1-9: 5
SUM OF ALL RESPONSES TO PHQ QUESTIONS 1-9: 5
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
## 2022-02-28 NOTE — PATIENT INSTRUCTIONS
Treatment Plan:    Continue trazodone 50 mg at bedtime for sleep.    Continue Adderall 10 mg daily for ADHD.    Continue clonidine 0.1 mg at bedtime for anxiety, sleep, irritability, ADHD.  Discussed trialing half a tablet around dinnertime and the other half a tablet at bedtime (or the entire tablet slightly earlier than bedtime to see if that lessens morning sedation).    Decrease duloxetine to 60 mg daily for 2 weeks and then try decreasing to 30 mg daily before next follow-up visit.  Take the taper as low as you need to.  Discussed alternating between 90 and 60 mg initially and then potentially alternating between 60 and 30 when you step down/work towards the 30 mg daily dose.  Please reach out if you have any questions or concerns related to discontinuation symptoms and the dose changes.    Continue all other cares per primary care provider.     Continue all other medications as reviewed per electronic medical record today.     Safety plan reviewed. To the Emergency Department as needed or call after hours crisis line at [phone] or [phone]. [place] Crisis Text Line. Text [place] to 546482 or Suicide LifeLine Chat: suicidepreventionlifeline.org/chat    Continue to consider individual psychotherapy for additional support and ongoing development of nonpharmacologic coping skills and strategies.    Schedule an appointment with me in about 4 weeks or sooner as needed. Call [facility] at [phone] to schedule.    Follow up with primary care provider as planned or for acute medical concerns.    Call the psychiatric nurse line with medication questions or concerns at [phone].    MyChart may be used to communicate with your provider, but this is not intended to be used for emergencies.    Have previously discussed risks of stimulant medication including, but not limited to, decreased appetite, risk of tics (and that they may be lasting), trouble sleeping, cardiac risks such as increased  heart rate and blood pressure, and rare risk of sudden cardiac death.  Also risk of addiction/tolerance/dependence.

## 2022-02-28 NOTE — Clinical Note
Please call this patient to get them scheduled for a follow-up visit in 4 weeks. Please schedule with me and the [facility]. Thanks!

## 2022-02-28 NOTE — PROGRESS NOTES
[facility] Psychiatry Service  February 28, 2022      Behavioral Health Clinician Progress Note    Patient Name: Zari Francisco      Telemedicine Visit: The patient's condition can be safely assessed and treated via synchronous audio and visual telemedicine encounter.      Reason for Telemedicine Visit: Services only offered telehealth    Originating Site (Patient Location): Patient's home    Distant Site (Provider Location): Provider Remote Setting- Home Office    Consent:  The patient/guardian has verbally consented to: the potential risks and benefits of telemedicine (video visit) versus in person care; bill my insurance or make self-payment for services provided; and responsibility for payment of non-covered services.     Mode of Communication:  Video Conference via Minbox    As the provider I attest to compliance with applicable laws and regulations related to telemedicine.         Service Type:  Individual      Service Location:   [facility] [place]     Session Start Time: 07:40am  Session End Time: 08:00am      Session Length: 16 - 37      Attendees: Patient    Visit Activities (Refresh list every visit): [facility] Only    Diagnostic Assessment Date: 01/17/2022  Treatment Plan Review Date: 02/28/2022  See Flowsheets for today's PHQ-9 and PUSHPA-7 results  Previous PHQ-9:   PHQ-9 SCORE 1/16/2022 1/16/2022 2/28/2022   PHQ-9 Total Score - - -   PHQ-9 Total Score MyChart - 4 (Minimal depression) 5 (Mild depression)   PHQ-9 Total Score 4 4 5     Previous PUSHPA-7:   PUSHPA-7 SCORE 12/20/2011 11/8/2013 2/25/2022   Total Score 5 8 -   Total Score - - 6 (mild anxiety)   Total Score - - 6       DATA  Extended Session (60+ minutes): No  Interactive Complexity: No  Crisis: No  [facility] Patient: No    Treatment Objective(s) Addressed in This Session:  Target Behavior(s): Anxiety management    Anxiety: will develop more effective coping skills to manage anxiety symptoms    Current Stressors / Issues:  Reported going through  a remodel, and her son and his family are staying with them. She enjoys it but the home is a little chaotic. She reported that she had some difficulty on vacation while in [place] and is grateful that she was able to get help from the nurses here. She spoke about struggling with some rhythmic tapping and leg shaking but said that it is much better than it once was. Most of the time she does not realize she is doing it. She is able to stop it when she realizes it is happening. She believes cymbalta makes that worse and would like to eventually be off Cymbalta. Despite these struggles, her mood is more stable and she is pleased with that. She spoke about a poor experience with a therapist and that it did not seem to be helping. We reviewed different types of therapies and why skills building options might be better for her. Additional information was sent via [employer] for review before next follow-up.      Progress on Treatment Objective(s) / Homework:  Minimal progress - PREPARATION (Decided to change - considering how); Intervened by negotiating a change plan and determining options / strategies for behavior change, identifying triggers, exploring social supports, and working towards setting a date to begin behavior change    Motivational Interviewing    MI Intervention: Expressed Empathy/Understanding, Supported Autonomy, Collaboration, Evocation, Open-ended questions and Reflections: simple and complex     Change Talk Expressed by the Patient: Activation Taking steps    Provider Response to Change Talk: E - Evoked more info from patient about behavior change, A - Affirmed patient's thoughts, decisions, or attempts at behavior change and R - Reflected patient's change talk    Also provided psychoeducation about behavioral health condition, symptoms, and treatment options    Care Plan review completed: Yes    Medication Review:  Changes to psychiatric medications, see updated Medication List in EPIC.     Medication  Compliance:  Yes    Changes in Health Issues:   None reported    Chemical Use Review:   Substance Use: Chemical use reviewed, no active concerns identified      Tobacco Use: No current tobacco use.      Assessment: Current Emotional / Mental Status (status of significant symptoms):  Risk status (Self / Other harm or suicidal ideation)  Patient denies a history of suicidal ideation, suicide attempts, self-injurious behavior, homicidal ideation, homicidal behavior and and other safety concerns  Patient denies current fears or concerns for personal safety.  Patient denies current or recent suicidal ideation or behaviors.  Patient denies current or recent homicidal ideation or behaviors.  Patient denies current or recent self injurious behavior or ideation.  Patient denies other safety concerns.  A safety and risk management plan has not been developed at this time, however patient was encouraged to call [place] [phone] should there be a change in any of these risk factors.    Appearance:   Appropriate   Eye Contact:   Good   Psychomotor Behavior: Normal   Attitude:   Cooperative   Orientation:   All  Speech   Rate / Production: Normal    Volume:  Normal   Mood:    Normal  Affect:    Appropriate   Thought Content:  Clear   Thought Form:  Coherent  Tangential   Insight:    Fair     Diagnoses:  1. Anxiety    2. Attention deficit hyperactivity disorder (ADHD), unspecified ADHD type    3. Alcohol abuse, in remission        Collateral Reports Completed:  Communicated with: Dr. Valentine    Plan: (Homework, other):  Patient was given information about behavioral services and instructed to schedule a follow up appointment with the [facility]   .  She was also given information about mental health symptoms and treatment options .  CD Recommendations: No indications of CD issues.  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    Anticipated number of session for this episode of care: 5-6  Anticipation frequency of session: As determined by Dr. Valentine  Anticipated Duration of each session: 16-37 minutes  Treatment plan will be reviewed in 90 days or when goals have been changed.       MeasurableTreatment Goal(s) related to diagnosis / functional impairment(s)  Goal 1: Patient will work with providers to manage symptoms    I will know I've met my goal when I'm calmer.      Objective #A (Patient Action)  Patient will attend all appointments, take medication as prescribed.  Status: New - Date: 02/28/2022     Intervention(s)  [facility] will Monitor and assist in overcoming barriers to treatment adherence    Objective #B  Patient will consider all recommendations offered.  Status: New - Date: 02/28/2022      Intervention(s)  [facility] will educate patient on treatment options, clarify concerns, work with pt to overcome any resistance to compliance.      Patient has reviewed and agreed to the above plan.      Mike Olsen PsyD  February 28, 2022

## 2022-03-01 ASSESSMENT — PATIENT HEALTH QUESTIONNAIRE - PHQ9: SUM OF ALL RESPONSES TO PHQ QUESTIONS 1-9: 5

## 2022-03-09 ENCOUNTER — TELEPHONE (OUTPATIENT)
Dept: PSYCHOLOGY | Facility: CLINIC | Age: 62
End: 2022-03-09
Payer: COMMERCIAL

## 2022-03-09 NOTE — TELEPHONE ENCOUNTER
Reason for call:  Other   Patient called regarding (reason for call): [facility] appt   Additional comments: Pt called to [facility] f/u with    Pt refusing [facility] appt states it is a financial burden and adds nothing to the process  Per client the information given to the [facility] was just asked again by dr. Valentine. When   Intake suggested that she discuss this with the [facility] and dr. Valentine @ next appt she stated absolutely not   I will not talk to the BC I will only work with     Phone number to reach patient:  Cell number on file:    Telephone Information:   Mobile [phone]       Best Time:  unk    Can we leave a detailed message on this number?  YES    Travel screening: Not Applicable

## 2022-03-16 ENCOUNTER — TRANSFERRED RECORDS (OUTPATIENT)
Dept: HEALTH INFORMATION MANAGEMENT | Facility: CLINIC | Age: 62
End: 2022-03-16
Payer: COMMERCIAL

## 2022-03-22 ENCOUNTER — MYC MEDICAL ADVICE (OUTPATIENT)
Dept: PSYCHIATRY | Facility: CLINIC | Age: 62
End: 2022-03-22
Payer: COMMERCIAL

## 2022-03-22 DIAGNOSIS — F10.11 ALCOHOL ABUSE, IN REMISSION: ICD-10-CM

## 2022-03-22 DIAGNOSIS — F41.1 GENERALIZED ANXIETY DISORDER: Primary | ICD-10-CM

## 2022-03-22 DIAGNOSIS — Z86.59 HX OF MAJOR DEPRESSION: ICD-10-CM

## 2022-03-22 DIAGNOSIS — F39 MOOD DISORDER (H): ICD-10-CM

## 2022-03-22 DIAGNOSIS — F98.8 ATTENTION DEFICIT DISORDER (ADD) WITHOUT HYPERACTIVITY: ICD-10-CM

## 2022-03-27 ENCOUNTER — E-VISIT (OUTPATIENT)
Dept: URGENT CARE | Facility: CLINIC | Age: 62
End: 2022-03-27
Payer: COMMERCIAL

## 2022-03-27 DIAGNOSIS — B96.89 ACUTE BACTERIAL SINUSITIS: Primary | ICD-10-CM

## 2022-03-27 DIAGNOSIS — J01.90 ACUTE BACTERIAL SINUSITIS: Primary | ICD-10-CM

## 2022-03-27 PROCEDURE — 99421 OL DIG E/M SVC 5-10 MIN: CPT | Performed by: NURSE PRACTITIONER

## 2022-03-27 RX ORDER — BENZONATATE 100 MG/1
100 CAPSULE ORAL 3 TIMES DAILY PRN
Qty: 30 CAPSULE | Refills: 0 | Status: SHIPPED | OUTPATIENT
Start: 2022-03-27 | End: 2022-07-19

## 2022-03-27 RX ORDER — GUAIFENESIN 1200 MG/1
1200 TABLET, EXTENDED RELEASE ORAL 2 TIMES DAILY
Qty: 60 TABLET | Refills: 0 | Status: SHIPPED | OUTPATIENT
Start: 2022-03-27 | End: 2022-07-19

## 2022-03-27 NOTE — PATIENT INSTRUCTIONS
You may want to try warm salt water gargles or rinses to feel better or help prevent another bout in the future. Mix 1 teaspoon of salt in 8 ounces of water, gargle, and spit. Do this several times a day for several days. Do not swallow the mixture.    You may want to try a nasal lavage (also known as nasal irrigation). You can find over-the-counter products, such as Neti-Pot, at retail locations or make your own at home. Instructions for homemade nasal lavage and more information on the process are available online at http://www.aafp.org/afp/2009/1115/p1121.html.      Sinusitis (Antibiotic Treatment)    The sinuses are air-filled spaces within the bones of the face. They connect to the inside of the nose. Sinusitis is an inflammation of the tissue that lines the sinuses. Sinusitis can occur during a cold. It can also happen due to allergies to pollens and other particles in the air. Sinusitis can cause symptoms of sinus congestion and a feeling of fullness. A sinus infection causes fever, headache, and facial pain. There is often green or yellow fluid draining from the nose or into the back of the throat (post-nasal drip). You have been given antibiotics to treat this condition.   Home care    Take the full course of antibiotics as instructed. Don't stop taking them, even when you feel better.    Drink plenty of water, hot tea, and other liquids as directed by the healthcare provider. This may help thin nasal mucus. It also may help your sinuses drain fluids.    Heat may help soothe painful areas of your face. Use a towel soaked in hot water. Or,  the shower and direct the warm spray onto your face. Using a vaporizer along with a menthol rub at night may also help soothe symptoms.     An expectorant with guaifenesin may help thin nasal mucus and help your sinuses drain fluids. Talk with your provider or pharmacists before taking an over-the-counter (OTC) medicine if you have any questions about it or its  side effects..    You can use an OTC decongestant, unless a similar medicine was prescribed to you. Nasal sprays work the fastest. Use one that contains phenylephrine or oxymetazoline. First blow your nose gently. Then use the spray. Don't use these medicines more often than directed on the label. If you do, your symptoms may get worse. You may also take pills that contain pseudoephedrine. Don t use products that combine multiple medicines. This is because side effects may be increased. Read labels. You can also ask the pharmacist for help. (People with high blood pressure should not use decongestants. They can raise blood pressure.) Talk with your provider or pharmacist if you have any questions about the medicine..    OTC antihistamines may help if allergies contributed to your sinusitis. Talk with your provider or pharmacist if you have any questions about the medicine..    Don't use nasal rinses or irrigation during an acute sinus infection, unless your healthcare provider tells you to. Rinsing may spread the infection to other areas in your sinuses.    Use acetaminophen or ibuprofen to control pain, unless another pain medicine was prescribed to you. If you have chronic liver or kidney disease or ever had a stomach ulcer, talk with your healthcare provider before using these medicines. Never give aspirin to anyone under age 18 who is ill with a fever. It may cause severe liver damage.    Don't smoke. This can make symptoms worse.    Follow-up care  Follow up with your healthcare provider, or as advised.   When to seek medical advice  Call your healthcare provider if any of these occur:     Facial pain or headache that gets worse    Stiff neck    Unusual drowsiness or confusion    Swelling of your forehead or eyelids    Symptoms don't go away in 10 days    Vision problems, such as blurred or double vision    Fever of 100.4 F (38 C) or higher, or as directed by your healthcare provider  Call 911  Call 911 if any  of these occur:     Seizure    Trouble breathing    Feeling dizzy or faint    Fingernails, skin or lips look blue, purple , or gray  Prevention  Here are steps you can take to help prevent an infection:     Keep good hand washing habits.    Don t have close contact with people who have sore throats, colds, or other upper respiratory infections.    Don t smoke, and stay away from secondhand smoke.    Stay up to date with of your vaccines.  [employer] last reviewed this educational content on 12/1/2019 2000-2021 [employer]. All rights reserved. This information is not intended as a substitute for professional medical care. Always follow your healthcare professional's instructions.        Dear Zari Francisco    After reviewing your responses, I've been able to diagnose you with?a sinus infection caused by bacteria.?     Based on your responses and diagnosis, I have prescribed augmentin to treat your symptoms. I have sent this to your pharmacy.?     It is also important to stay well hydrated, get lots of rest and take over-the-counter decongestants,?tylenol?or ibuprofen if you?are able to?take those medications per your primary care provider to help relieve discomfort.?     It is important that you take?all of?your prescribed medication even if your symptoms are improving after a few doses.? Taking?all of?your medicine helps prevent the symptoms from returning.?     If your symptoms worsen, you develop severe headache, vomiting, high fever (>102), or are not improving in 7 days, please contact your primary care provider for an appointment or visit any of our convenient Walk-in Care or Urgent Care Centers to be seen which can be found on our website?here.?     Thanks again for choosing?us?as your health care partner,?   ?  Zaira Bravo, CARLOS CNP?

## 2022-04-11 ENCOUNTER — TRANSFERRED RECORDS (OUTPATIENT)
Dept: HEALTH INFORMATION MANAGEMENT | Facility: CLINIC | Age: 62
End: 2022-04-11
Payer: COMMERCIAL

## 2022-04-19 DIAGNOSIS — F41.1 GENERALIZED ANXIETY DISORDER: ICD-10-CM

## 2022-04-19 RX ORDER — DULOXETIN HYDROCHLORIDE 60 MG/1
60 CAPSULE, DELAYED RELEASE ORAL DAILY
Qty: 90 CAPSULE | Refills: 0 | OUTPATIENT
Start: 2022-04-19

## 2022-04-19 NOTE — TELEPHONE ENCOUNTER
Date of Last Office Visit: 2/28/2022  Date of Next Office Visit: None scheduled.    No shows since last visit: none  Cancellations since last visit: none    Medication requested:DULoxetine (CYMBALTA) 60 MG capsule  Date last ordered: 1/17/2022 Qty: 90 Refills: 0    DULoxetine (CYMBALTA) 60 MG capsule 90 capsule 0 1/17/2022  --   Sig - Route: Take 1 capsule (60 mg) by mouth daily Take WITH 30 mg cap for total daily dose 90 mg. - Oral   Sent to pharmacy as: DULoxetine HCl 60 MG Oral Capsule Delayed Release Particles (CYMBALTA)   Class: E-Prescribe   Notes to Pharmacy: Dose decrease.   Order: [id number]   E-Prescribing Status: Receipt confirmed by pharmacy (1/17/2022 10:53 AM CST)          Review of [place] ?: not needed.      Lapse in medication adherence greater than 5 days?: no  If yes, call patient and gather details: na  Medication refill request verified as identical to current order?: yes  Result of Last DAM, VPA, Li+ Level, CBC, or Carbamazepine Level (at or since last visit): na    Last visit treatment plan:   Treatment Plan:    Continue trazodone 50 mg at bedtime for sleep.    Continue Adderall 10 mg daily for ADHD.    Continue clonidine 0.1 mg at bedtime for anxiety, sleep, irritability, ADHD.  Discussed trialing half a tablet around dinnertime and the other half a tablet at bedtime (or the entire tablet slightly earlier than bedtime to see if that lessens morning sedation).    Decrease duloxetine to 60 mg daily for 2 weeks and then try decreasing to 30 mg daily before next follow-up visit.  Take the taper as low as you need to.  Discussed alternating between 90 and 60 mg initially and then potentially alternating between 60 and 30 when you step down/work towards the 30 mg daily dose.  Please reach out if you have any questions or concerns related to discontinuation symptoms and the dose changes.    Continue all other cares per primary care provider.     Continue all other medications as reviewed per electronic  medical record today.     Safety plan reviewed. To the Emergency Department as needed or call after hours crisis line at [phone] or [phone]. [place] Crisis Text Line. Text [place] to 662669 or Suicide LifeLine Chat: suicidepreventionlifeline.org/chat    Continue to consider individual psychotherapy for additional support and ongoing development of nonpharmacologic coping skills and strategies.    Schedule an appointment with me in about 4 weeks or sooner as needed. Call [facility] at [phone] to schedule.    Follow up with primary care provider as planned or for acute medical concerns.    Call the psychiatric nurse line with medication questions or concerns at [phone].    Cearnahart may be used to communicate with your provider, but this is not intended to be used for emergencies.    []Medication refilled per  Medication Refill in Ambulatory Care  policy.  [x]Medication unable to be refilled by RN due to criteria not met as indicated below:    []Eligibility - not seen in the last year   [x]Supervision - no future appointment   []Compliance - no shows, cancellations or lapse in therapy   []Verification - order discrepancy   []Controlled medication   [x]Medication not included in policy   []90-day supply request   []Other

## 2022-04-26 DIAGNOSIS — F41.1 GENERALIZED ANXIETY DISORDER: ICD-10-CM

## 2022-04-26 RX ORDER — DULOXETIN HYDROCHLORIDE 30 MG/1
30 CAPSULE, DELAYED RELEASE ORAL DAILY
Qty: 90 CAPSULE | Refills: 0 | Status: SHIPPED | OUTPATIENT
Start: 2022-04-26 | End: 2022-07-22

## 2022-04-26 NOTE — TELEPHONE ENCOUNTER
Date of Last Office Visit: 02/28/2022  Date of Next Office Visit: None-will route to scheduling   No shows since last visit: 0  Cancellations since last visit: 0     Disp Refills Start End MICKY   DULoxetine (CYMBALTA) 30 MG capsule 90 capsule 0 1/17/2022  --       Lapse in medication adherence greater than 5 days?: no  If yes, call patient and gather details: n/a  Medication refill request verified as identical to current order?: yes  Result of Last DAM, VPA, Li+ Level, CBC, or Carbamazepine Level (at or since last visit): N/A    Last visit treatment plan:   Treatment Plan:    Continue trazodone 50 mg at bedtime for sleep.    Continue Adderall 10 mg daily for ADHD.    Continue clonidine 0.1 mg at bedtime for anxiety, sleep, irritability, ADHD.  Discussed trialing half a tablet around dinnertime and the other half a tablet at bedtime (or the entire tablet slightly earlier than bedtime to see if that lessens morning sedation).    Decrease duloxetine to 60 mg daily for 2 weeks and then try decreasing to 30 mg daily before next follow-up visit.  Take the taper as low as you need to.  Discussed alternating between 90 and 60 mg initially and then potentially alternating between 60 and 30 when you step down/work towards the 30 mg daily dose.  Please reach out if you have any questions or concerns related to discontinuation symptoms and the dose changes.    Continue all other cares per primary care provider.     Continue all other medications as reviewed per electronic medical record today.     Safety plan reviewed. To the Emergency Department as needed or call after hours crisis line at [phone] or [phone]. [place] Crisis Text Line. Text [place] to 302449 or Suicide LifeLine Chat: suicidepreventionlifeline.org/chat    Continue to consider individual psychotherapy for additional support and ongoing development of nonpharmacologic coping skills and strategies.    Schedule an appointment with me in about 4 weeks or  sooner as needed. Call [facility] at [phone] to schedule.    Follow up with primary care provider as planned or for acute medical concerns.    Call the psychiatric nurse line with medication questions or concerns at [phone].    WinViewhart may be used to communicate with your provider, but this is not intended to be used for emergencies.      []Medication refilled per  Medication Refill in Ambulatory Care  policy.  [x]Medication unable to be refilled by RN due to criteria not met as indicated below:    []Eligibility - not seen in the last year   [x]Supervision - no future appointment   []Compliance - no shows, cancellations or lapse in therapy   [x]Verification - order discrepancy - SIG should be changed for taper    []Controlled medication   [x]Medication not included in policy   []90-day supply request   []Other

## 2022-05-09 ENCOUNTER — MYC MEDICAL ADVICE (OUTPATIENT)
Dept: PSYCHIATRY | Facility: CLINIC | Age: 62
End: 2022-05-09
Payer: COMMERCIAL

## 2022-05-09 DIAGNOSIS — F41.1 GENERALIZED ANXIETY DISORDER: ICD-10-CM

## 2022-05-10 RX ORDER — LAMOTRIGINE 100 MG/1
100 TABLET ORAL 2 TIMES DAILY
Qty: 180 TABLET | Refills: 0 | Status: SHIPPED | OUTPATIENT
Start: 2022-05-10 | End: 2022-07-19

## 2022-05-10 NOTE — TELEPHONE ENCOUNTER
Date of Last Office Visit: 2/28/2022  Date of Next Office Visit: 5/19/2022  No shows since last visit: none  Cancellations since last visit: none    Medication requested: Lamotrigine 100 mg tablet Date last ordered: 2/8/2022 Qty: 180 Refills: 0     Review of [place] ?: n/a      Lapse in medication adherence greater than 5 days?: no  If yes, call patient and gather details: no  Medication refill request verified as identical to current order?: yes  Result of Last DAM, VPA, Li+ Level, CBC, or Carbamazepine Level (at or since last visit): N/A    Last visit treatment plan: Treatment Plan:    Continue trazodone 50 mg at bedtime for sleep.    Continue Adderall 10 mg daily for ADHD.    Continue clonidine 0.1 mg at bedtime for anxiety, sleep, irritability, ADHD.  Discussed trialing half a tablet around dinnertime and the other half a tablet at bedtime (or the entire tablet slightly earlier than bedtime to see if that lessens morning sedation).    Decrease duloxetine to 60 mg daily for 2 weeks and then try decreasing to 30 mg daily before next follow-up visit.  Take the taper as low as you need to.  Discussed alternating between 90 and 60 mg initially and then potentially alternating between 60 and 30 when you step down/work towards the 30 mg daily dose.  Please reach out if you have any questions or concerns related to discontinuation symptoms and the dose changes.    Continue all other cares per primary care provider.     Continue all other medications as reviewed per electronic medical record today.     Safety plan reviewed. To the Emergency Department as needed or call after hours crisis line at [phone] or [phone]. [place] Crisis Text Line. Text [place] to 673126 or Suicide LifeLine Chat: suicidepreventionlifeline.org/chat    Continue to consider individual psychotherapy for additional support and ongoing development of nonpharmacologic coping skills and strategies.    Schedule an appointment with me in about 4  weeks or sooner as needed. Call [facility] at [phone] to schedule.    Follow up with primary care provider as planned or for acute medical concerns.    Call the psychiatric nurse line with medication questions or concerns at [phone].    ComponentLabhart may be used to communicate with your provider, but this is not intended to be used for emergencies.     Have previously discussed risks of stimulant medication including, but not limited to, decreased appetite, risk of tics (and that they may be lasting), trouble sleeping, cardiac risks such as increased heart rate and blood pressure, and rare risk of sudden cardiac death.  Also risk of addiction/tolerance/dependence    []Medication refilled per  Medication Refill in Ambulatory Care  policy.  [x]Medication unable to be refilled by RN due to criteria not met as indicated below:    []Eligibility - not seen in the last year   []Supervision - no future appointment   []Compliance - no shows, cancellations or lapse in therapy   []Verification - order discrepancy   []Controlled medication   [x]Medication not included in policy   []90-day supply request   []Other

## 2022-05-13 ENCOUNTER — MYC MEDICAL ADVICE (OUTPATIENT)
Dept: PSYCHIATRY | Facility: CLINIC | Age: 62
End: 2022-05-13
Payer: COMMERCIAL

## 2022-05-18 DIAGNOSIS — F41.1 GENERALIZED ANXIETY DISORDER: ICD-10-CM

## 2022-05-18 DIAGNOSIS — F98.8 ATTENTION DEFICIT DISORDER (ADD) WITHOUT HYPERACTIVITY: ICD-10-CM

## 2022-05-18 RX ORDER — CLONIDINE HYDROCHLORIDE 0.1 MG/1
0.1 TABLET ORAL AT BEDTIME
Qty: 7 TABLET | Refills: 0 | Status: SHIPPED | OUTPATIENT
Start: 2022-05-18 | End: 2022-05-26

## 2022-05-18 NOTE — TELEPHONE ENCOUNTER
Date of Last Office Visit: 2/28/2022  Date of Next Office Visit: 5/20/2022  No shows since last visit: none  Cancellations since last visit: none    Medication requested: Clonidine 0.1 mg tablet Date last ordered: 2/8/2022 Qty: 0 Refills: 1     Review of [place] ?: n/a      Lapse in medication adherence greater than 5 days?: no  If yes, call patient and gather details: no  Medication refill request verified as identical to current order?: yes  Result of Last DAM, VPA, Li+ Level, CBC, or Carbamazepine Level (at or since last visit): N/A    Last visit treatment plan: Treatment Plan:    Continue trazodone 50 mg at bedtime for sleep.    Continue Adderall 10 mg daily for ADHD.    Continue clonidine 0.1 mg at bedtime for anxiety, sleep, irritability, ADHD.  Discussed trialing half a tablet around dinnertime and the other half a tablet at bedtime (or the entire tablet slightly earlier than bedtime to see if that lessens morning sedation).    Decrease duloxetine to 60 mg daily for 2 weeks and then try decreasing to 30 mg daily before next follow-up visit.  Take the taper as low as you need to.  Discussed alternating between 90 and 60 mg initially and then potentially alternating between 60 and 30 when you step down/work towards the 30 mg daily dose.  Please reach out if you have any questions or concerns related to discontinuation symptoms and the dose changes.    Continue all other cares per primary care provider.     Continue all other medications as reviewed per electronic medical record today.     Safety plan reviewed. To the Emergency Department as needed or call after hours crisis line at [phone] or [phone]. [place] Crisis Text Line. Text [place] to 090206 or Suicide LifeLine Chat: suicidepreventionlifeline.org/chat    Continue to consider individual psychotherapy for additional support and ongoing development of nonpharmacologic coping skills and strategies.    Schedule an appointment with me in about 4 weeks or  sooner as needed. Call [facility] at [phone] to schedule.    Follow up with primary care provider as planned or for acute medical concerns.    Call the psychiatric nurse line with medication questions or concerns at [phone].    MyChart may be used to communicate with your provider, but this is not intended to be used for emergencies.     Have previously discussed risks of stimulant medication including, but not limited to, decreased appetite, risk of tics (and that they may be lasting), trouble sleeping, cardiac risks such as increased heart rate and blood pressure, and rare risk of sudden cardiac death.  Also risk of addiction/tolerance/dependence.       []Medication refilled per  Medication Refill in Ambulatory Care  policy.  [x]Medication unable to be refilled by RN due to criteria not met as indicated below:    []Eligibility - not seen in the last year   []Supervision - no future appointment   []Compliance - no shows, cancellations or lapse in therapy   []Verification - order discrepancy   []Controlled medication   [x]Medication not included in policy   []90-day supply request   []Other

## 2022-05-19 ASSESSMENT — PATIENT HEALTH QUESTIONNAIRE - PHQ9
10. IF YOU CHECKED OFF ANY PROBLEMS, HOW DIFFICULT HAVE THESE PROBLEMS MADE IT FOR YOU TO DO YOUR WORK, TAKE CARE OF THINGS AT HOME, OR GET ALONG WITH OTHER PEOPLE: NOT DIFFICULT AT ALL
SUM OF ALL RESPONSES TO PHQ QUESTIONS 1-9: 2
SUM OF ALL RESPONSES TO PHQ QUESTIONS 1-9: 2

## 2022-05-19 NOTE — PROGRESS NOTES
"[facility] Psychiatry Service  May 20, 2022      Behavioral Health Clinician Progress Note    Patient Name: Zari Francisco      Telemedicine Visit: The patient's condition can be safely assessed and treated via synchronous audio and visual telemedicine encounter.      Reason for Telemedicine Visit: Services only offered telehealth    Originating Site (Patient Location): Patient's home    Distant Site (Provider Location): Provider Remote Setting- Home Office    Consent:  The patient/guardian has verbally consented to: the potential risks and benefits of telemedicine (video visit) versus in person care; bill my insurance or make self-payment for services provided; and responsibility for payment of non-covered services.     Mode of Communication:  Video Conference via [employer]    As the provider I attest to compliance with applicable laws and regulations related to telemedicine.         Service Type:  Individual      Service Location:   [facility]     Session Start Time: 07:35am  Session End Time: 08:00am      Session Length: 16 - 37      Attendees: Patient    Visit Activities (Refresh list every visit): [facility] Only    Diagnostic Assessment Date: 01/17/2022  Treatment Plan Review Date: 08/20/2022  See Flowsheets for today's PHQ-9 and PUSHPA-7 results  Previous PHQ-9:   PHQ-9 SCORE 2/28/2022 5/19/2022 5/19/2022   PHQ-9 Total Score - - -   PHQ-9 Total Score MyChart 5 (Mild depression) - 2 (Minimal depression)   PHQ-9 Total Score 5 2 2     Previous PUSHPA-7:   PUSHPA-7 SCORE 12/20/2011 11/8/2013 2/25/2022   Total Score 5 8 -   Total Score - - 6 (mild anxiety)   Total Score - - 6       DATA  Extended Session (60+ minutes): No  Interactive Complexity: No  Crisis: No  [facility] Patient: No    Treatment Objective(s) Addressed in This Session:  Target Behavior(s): Anxiety management    Anxiety: will develop more effective coping skills to manage anxiety symptoms    Current Stressors / Issues:  Reported that she is \"doing " "fabulous.\" She spoke about a recent 4 day horse riding trip. Her mood has been stable. She is enjoying spending time with her grandchildren while they're living with her until they close on their new home. Her sleep has been stable and she is no longer feeling sedated in the morning. She spoke about her struggles tapering off duloxetine. She was feeling \"edgy and snappy\" when she started taking 60 mg and decided to go back to 90 mg. She spoke about wanting to reduce the trazodone. She continues to have restless legs and the rhythmic tapping of her hands and shaking her legs. Most of the time she does not notice it until either someone points it out to her or it increases in intensity and she notices it. She takes her Adderall around noon and that seems to be good time for her without interfering with her sleep at night.       02/28/2022:  Reported going through a remodel, and her son and his family are staying with them. She enjoys it but the home is a little chaotic. She reported that she had some difficulty on vacation while in [place] and is grateful that she was able to get help from the nurses here. She spoke about struggling with some rhythmic tapping and leg shaking but said that it is much better than it once was. Most of the time she does not realize she is doing it. She is able to stop it when she realizes it is happening. She believes cymbalta makes that worse and would like to eventually be off Cymbalta. Despite these struggles, her mood is more stable and she is pleased with that. She spoke about a poor experience with a therapist and that it did not seem to be helping. We reviewed different types of therapies and why skills building options might be better for her. Additional information was sent via [employer] for review before next follow-up.      Progress on Treatment Objective(s) / Homework:  Minimal progress - PREPARATION (Decided to change - considering how); Intervened by negotiating a change plan " and determining options / strategies for behavior change, identifying triggers, exploring social supports, and working towards setting a date to begin behavior change    Motivational Interviewing    MI Intervention: Expressed Empathy/Understanding, Supported Autonomy, Collaboration, Evocation, Open-ended questions and Reflections: simple and complex     Change Talk Expressed by the Patient: Activation Taking steps    Provider Response to Change Talk: E - Evoked more info from patient about behavior change, A - Affirmed patient's thoughts, decisions, or attempts at behavior change and R - Reflected patient's change talk    Also provided psychoeducation about behavioral health condition, symptoms, and treatment options    Care Plan review completed: Yes    Medication Review:  Changes to psychiatric medications, see updated Medication List in EPIC.     Medication Compliance:  Yes    Changes in Health Issues:   None reported    Chemical Use Review:   Substance Use: Chemical use reviewed, no active concerns identified      Tobacco Use: No current tobacco use.      Assessment: Current Emotional / Mental Status (status of significant symptoms):  Risk status (Self / Other harm or suicidal ideation)  Patient denies a history of suicidal ideation, suicide attempts, self-injurious behavior, homicidal ideation, homicidal behavior and and other safety concerns  Patient denies current fears or concerns for personal safety.  Patient denies current or recent suicidal ideation or behaviors.  Patient denies current or recent homicidal ideation or behaviors.  Patient denies current or recent self injurious behavior or ideation.  Patient denies other safety concerns.  A safety and risk management plan has not been developed at this time, however patient was encouraged to call [facility] [place] [place] / [facility] should there be a change in any of these risk factors.    Appearance:   Appropriate   Eye Contact:   Good   Psychomotor Behavior: Normal    Attitude:   Cooperative   Orientation:   All  Speech   Rate / Production: Normal    Volume:  Normal   Mood:    Anxious  Normal  Affect:    Appropriate   Thought Content:  Clear   Thought Form:  Coherent  Tangential   Insight:    Fair     Diagnoses:  1. Generalized anxiety disorder    2. Alcohol abuse, in remission    3. Attention deficit disorder (ADD) without hyperactivity        Collateral Reports Completed:  Communicated with: Dr. Valentine    Plan: (Homework, other):  Patient was given information about behavioral services and instructed to schedule a follow up appointment with [facility]   .  She was also given information about mental health symptoms and treatment options .  CD Recommendations: No indications of CD issues.  Ervin Olsen PsyD, LP      ______________________________________________________________________    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    Anticipated number of session for this episode of care: 5-6  Anticipation frequency of session: As determined by Dr. Valentine  Anticipated Duration of each  session: 16-37 minutes  Treatment plan will be reviewed in 90 days or when goals have been changed.       MeasurableTreatment Goal(s) related to diagnosis / functional impairment(s)  Goal 1: Patient will work with providers to manage symptoms    I will know I've met my goal when I'm calmer.      Objective #A (Patient Action)  Patient will attend all appointments, take medication as prescribed.  Status: Continued - Date(s): 05/20/2022     Intervention(s)  [facility] will Monitor and assist in overcoming barriers to treatment adherence    Objective #B  Patient will consider all recommendations offered.  Status: Continued - Date(s): 05/20/2022      Intervention(s)  [facility] will educate patient on treatment options, clarify concerns, work with pt to overcome any resistance to compliance.      Patient has reviewed and agreed to the above plan.      Mike Olsen PsyD  May 20, 2022

## 2022-05-20 ENCOUNTER — VIRTUAL VISIT (OUTPATIENT)
Dept: PSYCHOLOGY | Facility: CLINIC | Age: 62
End: 2022-05-20
Payer: COMMERCIAL

## 2022-05-20 ENCOUNTER — MYC MEDICAL ADVICE (OUTPATIENT)
Dept: PSYCHIATRY | Facility: CLINIC | Age: 62
End: 2022-05-20
Payer: COMMERCIAL

## 2022-05-20 ENCOUNTER — VIRTUAL VISIT (OUTPATIENT)
Dept: PSYCHIATRY | Facility: CLINIC | Age: 62
End: 2022-05-20
Payer: COMMERCIAL

## 2022-05-20 DIAGNOSIS — F41.1 GENERALIZED ANXIETY DISORDER: Primary | ICD-10-CM

## 2022-05-20 DIAGNOSIS — F41.1 GENERALIZED ANXIETY DISORDER: ICD-10-CM

## 2022-05-20 DIAGNOSIS — F98.8 ATTENTION DEFICIT DISORDER (ADD) WITHOUT HYPERACTIVITY: ICD-10-CM

## 2022-05-20 DIAGNOSIS — Z86.59 HX OF MAJOR DEPRESSION: ICD-10-CM

## 2022-05-20 DIAGNOSIS — F10.11 ALCOHOL ABUSE, IN REMISSION: ICD-10-CM

## 2022-05-20 PROCEDURE — 99214 OFFICE O/P EST MOD 30 MIN: CPT | Mod: 95 | Performed by: PSYCHIATRY & NEUROLOGY

## 2022-05-20 PROCEDURE — 90832 PSYTX W PT 30 MINUTES: CPT | Mod: 95 | Performed by: PSYCHOLOGIST

## 2022-05-20 RX ORDER — DEXTROAMPHETAMINE SACCHARATE, AMPHETAMINE ASPARTATE, DEXTROAMPHETAMINE SULFATE AND AMPHETAMINE SULFATE 2.5; 2.5; 2.5; 2.5 MG/1; MG/1; MG/1; MG/1
10 TABLET ORAL DAILY
Qty: 30 TABLET | Refills: 0 | Status: SHIPPED | OUTPATIENT
Start: 2022-07-21 | End: 2022-08-20

## 2022-05-20 RX ORDER — DEXTROAMPHETAMINE SACCHARATE, AMPHETAMINE ASPARTATE, DEXTROAMPHETAMINE SULFATE AND AMPHETAMINE SULFATE 2.5; 2.5; 2.5; 2.5 MG/1; MG/1; MG/1; MG/1
10 TABLET ORAL DAILY
Qty: 30 TABLET | Refills: 0 | Status: SHIPPED | OUTPATIENT
Start: 2022-05-20 | End: 2022-06-19

## 2022-05-20 RX ORDER — DULOXETIN HYDROCHLORIDE 60 MG/1
60 CAPSULE, DELAYED RELEASE ORAL DAILY
Qty: 90 CAPSULE | Refills: 0 | Status: SHIPPED | OUTPATIENT
Start: 2022-05-20 | End: 2022-09-29

## 2022-05-20 RX ORDER — DEXTROAMPHETAMINE SACCHARATE, AMPHETAMINE ASPARTATE, DEXTROAMPHETAMINE SULFATE AND AMPHETAMINE SULFATE 2.5; 2.5; 2.5; 2.5 MG/1; MG/1; MG/1; MG/1
10 TABLET ORAL DAILY
Qty: 7 TABLET | Refills: 0 | Status: SHIPPED | OUTPATIENT
Start: 2022-05-20 | End: 2022-07-19

## 2022-05-20 RX ORDER — DEXTROAMPHETAMINE SACCHARATE, AMPHETAMINE ASPARTATE, DEXTROAMPHETAMINE SULFATE AND AMPHETAMINE SULFATE 2.5; 2.5; 2.5; 2.5 MG/1; MG/1; MG/1; MG/1
10 TABLET ORAL DAILY
Qty: 30 TABLET | Refills: 0 | Status: SHIPPED | OUTPATIENT
Start: 2022-06-20 | End: 2022-07-19
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
## 2022-05-20 NOTE — PATIENT INSTRUCTIONS
Treatment Plan:  Decrease trazodone to 25 mg at bedtime as needed for sleep.  Continue Adderall 10 mg daily as needed for ADHD.  Increase clonidine to 0.15-0.2 mg at bedtime for anxiety, sleep, irritability, ADHD. Can split your dose and take some in the AM or afternoon and rest at bedtime, just do not exceed total daily dose of 0.2 mg.    Decrease/Discontinue duloxetine after off of lamotrigine for two weeks:   For weeks 1-2: take 90 mg for two days then 60 mg for one day then 90 mg for two days then 60 mg for one day, etc.  For weeks 3-4: alternate 60 mg every other day with 90 mg  For weeks 4-6: take 60 mg for two days then 90 mg for one day then 60 mg for two days then 90 mg for one day, etc  For week 7: take 60 mg daily  For weeks 8-9: take 60 mg for two days then 30 mg for one day then 60 mg for two days then 30 mg for one day, etc  For weeks 10-11: alternate 60 mg every other day with 30 mg  For weeks 12-13: take 30 mg for two days then 60 mg for one day then 30 mg for two days then 60 mg for one day, etc  For week 14: take 30 mg daily  For weeks 15-16: take 30 mg for two days then no duloxetine for one day then 30 mg for two days then no duloxetine for one day, etc  For weeks 17-18: alternate 30 mg every other day with no duloxetine  For weeks 19-20: take 30 mg one day and then two days no duloxetine then 30 mg one day then two days no medication  Week 21: Then stop duloxetine  Discontinue lamotrigine: take 150 mg daily for 5 days then 100 mg for 5 days then 50 mg for 5 days then stop.    Continue all other cares per primary care provider.   Continue all other medications as reviewed per electronic medical record today.   Safety plan reviewed. To the Emergency Department as needed or call after hours crisis line at [phone] or [phone]. [place] Crisis Text Line. Text [place] to 284683 or Suicide LifeLine Chat: suicidepreventionlifeline.org/chat  Continue to consider individual psychotherapy for  additional support and ongoing development of nonpharmacologic coping skills and strategies.  Schedule an appointment with me in about 6 months or sooner as needed. Call [facility] at [phone] to schedule.  Follow up with primary care provider as planned or for acute medical concerns.  Call the psychiatric nurse line with medication questions or concerns at [phone].  [employer] may be used to communicate with your provider, but this is not intended to be used for emergencies.    Have previously discussed risks of stimulant medication including, but not limited to, decreased appetite, risk of tics (and that they may be lasting), trouble sleeping, cardiac risks such as increased heart rate and blood pressure, and rare risk of sudden cardiac death.  Also risk of addiction/tolerance/dependence.    Have previously discussed Lamictal (lamotrigine) can cause serious rashes including Madison-Martin syndrome which may requiring hospitalization and discontinuation of treatment. If any signs of a rash occur, please see your Primary Care Provider or a dermatologist immediately.

## 2022-05-26 RX ORDER — CLONIDINE HYDROCHLORIDE 0.1 MG/1
TABLET ORAL
Qty: 180 TABLET | Refills: 1 | Status: SHIPPED | OUTPATIENT
Start: 2022-05-26 | End: 2022-09-29

## 2022-06-20 ENCOUNTER — TELEPHONE (OUTPATIENT)
Dept: SLEEP MEDICINE | Facility: CLINIC | Age: 62
End: 2022-06-20

## 2022-06-20 DIAGNOSIS — G25.81 RESTLESS LEGS SYNDROME (RLS): Primary | ICD-10-CM

## 2022-06-20 NOTE — TELEPHONE ENCOUNTER
Reason for call:  Other   Patient called regarding (reason for call): prescription  Additional comments: RLS symptoms are getting worse, PT is requesting  A change in medication dosage    Phone number to reach patient:  Cell number on file:    Telephone Information:   Mobile [phone]       Best Time:  any    Can we leave a detailed message on this number?  YES    Travel screening: Not Applicable

## 2022-06-21 NOTE — TELEPHONE ENCOUNTER
LOV: 12/08/2021  Upcoming Visit: 09/13/2022    [employer] message sent to patient letting her know that Cynthia Braxton PA-C is currently out of office but will address this concern upon his return.    Zuly Lilly RN on 6/21/2022 at 7:36 AM

## 2022-07-01 ENCOUNTER — MYC MEDICAL ADVICE (OUTPATIENT)
Dept: FAMILY MEDICINE | Facility: CLINIC | Age: 62
End: 2022-07-01

## 2022-07-01 ENCOUNTER — MYC MEDICAL ADVICE (OUTPATIENT)
Dept: PSYCHIATRY | Facility: CLINIC | Age: 62
End: 2022-07-01

## 2022-07-01 DIAGNOSIS — G25.81 RESTLESS LEG SYNDROME: ICD-10-CM

## 2022-07-01 RX ORDER — GABAPENTIN 300 MG/1
600 CAPSULE ORAL AT BEDTIME
Qty: 180 CAPSULE | Refills: 3 | Status: SHIPPED | OUTPATIENT
Start: 2022-07-01 | End: 2022-07-19

## 2022-07-07 NOTE — TELEPHONE ENCOUNTER
Patient called back and does not want to take Iron due to constipation. Patient would like to know what an alternate option would be. Please call patient to discuss.     Zuly Lilly RN on 7/7/2022 at 9:43 AM

## 2022-07-07 NOTE — TELEPHONE ENCOUNTER
I have placed an order for Ferritin. Please facilitate a follow up appointment with me once Ferritin is done.

## 2022-07-18 ENCOUNTER — NURSE TRIAGE (OUTPATIENT)
Dept: FAMILY MEDICINE | Facility: CLINIC | Age: 62
End: 2022-07-18

## 2022-07-18 NOTE — TELEPHONE ENCOUNTER
"Patient calling feeling \"terrible\" and describes herself as being very sick.  She became symptomatic on Friday, cough, sneezing.     Saturday became more ill, felt very chilled, muscles in her legs and arms are very weak and tired, as if she worked out hard.  She does not feel body aches.   Her breathing is very tight and when she takes a deep breath in, (able to do that) she feels dizzy and coughs, which is now productive.      Patient is concerned about pneumonia.  Advised patient to go to the [facility] at this time due to symptoms of tight breathing and coughing.  Patient agrees to plan.    Reason for Disposition    Patient sounds very sick or weak to the triager    Additional Information    Negative: Severe difficulty breathing (e.g., struggling for each breath, speaks in single words)    Negative: Bluish (or gray) lips or face now    Negative: [1] Difficulty breathing AND [2] exposure to flames, smoke, or fumes    Negative: [1] Stridor AND [2] difficulty breathing    Negative: Sounds like a life-threatening emergency to the triager    Negative: [1] Previous asthma attacks AND [2] this feels like asthma attack    Negative: Dry (non-productive) cough (i.e., no sputum or minimal clear sputum)    Negative: Chest pain  (Exception: MILD central chest pain, present only when coughing)    Negative: Difficulty breathing    Answer Assessment - Initial Assessment Questions  1. ONSET: \"When did the cough begin?\"       2 days ago  2. SEVERITY: \"How bad is the cough today?\"       Became productive  3. RESPIRATORY DISTRESS: \"Describe your breathing.\"       States she can breath, but feels very tight in her chest, able to take a deep breath in, but has some SOB with walking  4. FEVER: \"Do you have a fever?\" If so, ask: \"What is your temperature, how was it measured, and when did it start?\"      Currently it is normal per temporal thermometer, but she has chills and feels very cold  5. SPUTUM: \"Describe the color " "of your sputum\" (clear, white, yellow, green)       n/a  6. HEMOPTYSIS: \"Are you coughing up any blood?\" If so ask: \"How much?\" (flecks, streaks, tablespoons, etc.)      no  7. CARDIAC HISTORY: \"Do you have any history of heart disease?\" (e.g., heart attack, congestive heart failure)       no  8. LUNG HISTORY: \"Do you have any history of lung disease?\"  (e.g., pulmonary embolus, asthma, emphysema)       Patient has some pulmonary nodules and was a smoker  9. PE RISK FACTORS: \"Do you have a history of blood clots?\" (or: recent major surgery, recent prolonged travel, bedridden)      No known PE  10. OTHER SYMPTOMS: \"Do you have any other symptoms?\" (e.g., runny nose, wheezing, chest pain)        Not chest pain as much as  Very tight chest, her head feels full, not as much painful as it is just feeling sinus pressure, her face feels full  11. PREGNANCY: \"Is there any chance you are pregnant?\" \"When was your last menstrual period?\"        no  12. TRAVEL: \"Have you traveled out of the country in the last month?\" (e.g., travel history, exposures)        No, got Covid from her granddaughter    Protocols used: COUGH - ACUTE [id number]    Nay Carolina RN, [facility]    "

## 2022-07-18 NOTE — PROGRESS NOTES
Medication Therapy Management (MTM) Encounter    ASSESSMENT:                            Medication Adherence/Access: No issues identified    RLS: May benefit from iron infusion to treat low ferritin levels. From sleep medicine note 12/2021 it was mentioned that an order would be placed for iron infusion to check on insurance coverage. Will follow up with sleep clinic regarding next steps.    Insomnia: Stable.    ADHD/Anxiety: follows closely with psychiatry. Could try splitting clonidine dose into morning and evening dose to see if more effective.    COVID: Recently diagnosed and started on treatment.    PLAN:                            Take clonidine 0.1mg twice daily.    Follow-up: 1 month    SUBJECTIVE/OBJECTIVE:                          Zari Francisco is a 62 year old female contacted via secure video for an initial visit for 2022. She was referred to me from Susanna Dyer Follow up from 11/21/21.    Reason for visit: Medication Review.    Allergies/ADRs: Reviewed in chart  Past Medical History: Reviewed in chart  Tobacco: She reports that she has never smoked. She has never used smokeless tobacco.  Alcohol: history of alcohol dependence; in recovery, 31 years  Caffeine: 1/2 caff 1/2 decaff  Activity: works out with  twice a week, rides horses    Medication Adherence/Access: Per patient, misses medication 0 times per week.   Medication barriers: none.   The patient fills medications at [facility]: NO, fills medications at [facility].    RLS: current therapy includes gabapentin 500mg at 730pm (increased dose has been helpful), ropinirole 0.5mg during at 2pm and 0.5mg an hour before bedtime. Bedtime is usually around 10pm. Patient is following with sleep clinic. As soon as she fully relax in the afternoon her legs start to bother her. Denies side effects. Medications work for awhile and then the medications wear off. No morning breakthrough symptoms.   Cannot take iron because it causes  constipation. Is waiting to see if an iron infusion would be covered.   Shredded sheets a couple of nights ago.  Ferritin   Date Value Ref Range Status   10/04/2021 34 8 - 252 ng/mL Final   10/22/2018 29 8 - 252 ng/mL Final     Insomnia: Current medications include: trazodone 50mg before bedtime. Has been holding since starting paxlovid.     ADHD/Anxiety: current medications include adderall 10mg daily, duloxetine 60mg alternating with 90mg daily, clonidine 0.2mg daily taking at night (Rani had said she can take 1 tablet twice daily or both at night). Follows with Rani Serge. Is tapering off of duloxetine and stopping in September. Has already tapered off of lamotrigine and did not have any issues with discontinuing lamotrigine. Did have a panic attack yesterday but she spent 5 hours in the ED and feeling so sick. Diagnosed with COVID.    COVID: current therapy includes paxlovid. Has had 3 doses. Does complain of the metallic taste in her mouth. Paxlovid may interact with trazodone, she did not take any trazodone last night.     Today's Vitals: There were no vitals taken for this visit.  ----------------    I spent 45 minutes with this patient today. All changes were made via collaborative practice agreement with Susanna Dyer. A copy of the visit note was provided to the patient's provider(s).    The patient was sent via [employer] a summary of these recommendations.     Nay Francois, Pharm.D, BCACP  Medication Therapy Management Pharmacist      Telemedicine Visit Details  Type of service:  Video Conference via [employer]  Start Time: 3:45PM  End Time: 4:30PM  Originating Location (patient location): Home  Distant Location (provider location):  [facility]     Medication Therapy Recommendations  Attention deficit disorder (ADD) without hyperactivity    Current Medication: cloNIDine (CATAPRES) 0.1 MG tablet   Rationale: Incorrect administration - Dosage too low - Effectiveness    Recommendation: Change Administration Time   Status: Patient Agreed - Adherence/Education

## 2022-07-19 ENCOUNTER — VIRTUAL VISIT (OUTPATIENT)
Dept: PHARMACY | Facility: CLINIC | Age: 62
End: 2022-07-19
Payer: COMMERCIAL

## 2022-07-19 DIAGNOSIS — U07.1 COVID-19: ICD-10-CM

## 2022-07-19 DIAGNOSIS — F98.8 ATTENTION DEFICIT DISORDER (ADD) WITHOUT HYPERACTIVITY: ICD-10-CM

## 2022-07-19 DIAGNOSIS — F41.9 ANXIETY AND DEPRESSION: ICD-10-CM

## 2022-07-19 DIAGNOSIS — F32.A ANXIETY AND DEPRESSION: ICD-10-CM

## 2022-07-19 DIAGNOSIS — G25.81 RESTLESS LEG SYNDROME: ICD-10-CM

## 2022-07-19 DIAGNOSIS — G47.00 PERSISTENT DISORDER OF INITIATING OR MAINTAINING SLEEP: Primary | ICD-10-CM

## 2022-07-19 PROCEDURE — 99605 MTMS BY PHARM NP 15 MIN: CPT | Performed by: PHARMACIST

## 2022-07-19 PROCEDURE — 99607 MTMS BY PHARM ADDL 15 MIN: CPT | Performed by: PHARMACIST

## 2022-07-19 RX ORDER — ROPINIROLE 1 MG/1
0.5 TABLET, FILM COATED ORAL 2 TIMES DAILY
COMMUNITY
End: 2022-11-02

## 2022-07-19 RX ORDER — GABAPENTIN 100 MG/1
200 CAPSULE ORAL AT BEDTIME
COMMUNITY
End: 2022-08-12

## 2022-07-19 RX ORDER — GABAPENTIN 300 MG/1
300 CAPSULE ORAL AT BEDTIME
COMMUNITY
End: 2022-07-27

## 2022-07-19 NOTE — Clinical Note
Abdulkadir Marie, It looks like you had recommended an iron infusion for Zari back in December because she doesn't tolerate oral iron but she hasn't heard anything regarding this and I couldn't see that it was ordered. Would you be open to ordering an iron infusion for her? Thank you for considering, Nay Francois, Pharm.D, BCACP Medication Therapy Management Pharmacist

## 2022-07-20 NOTE — PATIENT INSTRUCTIONS
"Recommendations from today's MTM visit:                                                         Take clonidine 0.1mg twice daily.    Follow-up: 1 month    It was great speaking with you today.  I value your experience and would be very thankful for your time in providing feedback in our clinic survey. In the next few days, you may receive an email or text message from [facility] [employer] with a link to a survey related to your  clinical pharmacist.\"     To schedule another MTM appointment, please call the clinic directly or you may call the MTM scheduling line at [phone] or toll-free at [phone].     My Clinical Pharmacist's contact information:                                                      Please feel free to contact me with any questions or concerns you have.      Nay Francois, Pharm.D, [facility]  Medication Therapy Management Pharmacist      "

## 2022-07-21 ENCOUNTER — MYC MEDICAL ADVICE (OUTPATIENT)
Dept: PSYCHIATRY | Facility: CLINIC | Age: 62
End: 2022-07-21

## 2022-07-21 DIAGNOSIS — F41.1 GENERALIZED ANXIETY DISORDER: ICD-10-CM

## 2022-07-22 RX ORDER — DULOXETIN HYDROCHLORIDE 30 MG/1
30 CAPSULE, DELAYED RELEASE ORAL DAILY
Qty: 90 CAPSULE | Refills: 0 | Status: SHIPPED | OUTPATIENT
Start: 2022-07-22 | End: 2022-09-29

## 2022-07-22 NOTE — TELEPHONE ENCOUNTER
Date of Last Office Visit: 5/20/2022  Date of Next Office Visit: none (scheduling, please connect with patient and schedule follow up appointment with provider)  No shows since last visit: none   Cancellations since last visit: none    Medication requested: Duloxetine 30 mg capsule Date last ordered: 4/26/2022 Qty: 90 Refills: 0     Review of [place] ?: n/a      Lapse in medication adherence greater than 5 days?: no  If yes, call patient and gather details: no  Medication refill request verified as identical to current order?: yes  Result of Last DAM, VPA, Li+ Level, CBC, or Carbamazepine Level (at or since last visit): N/A    Last visit treatment plan:   Treatment Plan:    Decrease trazodone to 25 mg at bedtime as needed for sleep.    Continue Adderall 10 mg daily as needed for ADHD.    Increase clonidine to 0.15-0.2 mg at bedtime for anxiety, sleep, irritability, ADHD. Can split your dose and take some in the AM or afternoon and rest at bedtime, just do not exceed total daily dose of 0.2 mg.      Decrease/Discontinue duloxetine after off of lamotrigine for two weeks:  ?  For weeks 1-2: take 90 mg for two days then 60 mg for one day then 90 mg for two days then 60 mg for one day, etc.  ? For weeks 3-4: alternate 60 mg every other day with 90 mg  ? For weeks 4-6: take 60 mg for two days then 90 mg for one day then 60 mg for two days then 90 mg for one day, etc  ? For week 7: take 60 mg daily  ? For weeks 8-9: take 60 mg for two days then 30 mg for one day then 60 mg for two days then 30 mg for one day, etc  ? For weeks 10-11: alternate 60 mg every other day with 30 mg  ? For weeks 12-13: take 30 mg for two days then 60 mg for one day then 30 mg for two days then 60 mg for one day, etc  ? For week 14: take 30 mg daily  ? For weeks 15-16: take 30 mg for two days then no duloxetine for one day then 30 mg for two days then no duloxetine for one day, etc  ? For weeks 17-18: alternate 30 mg every other day with no  duloxetine  ? For weeks 19-20: take 30 mg one day and then two days no duloxetine then 30 mg one day then two days no medication  ? Week 21: Then stop duloxetine    Discontinue lamotrigine: take 150 mg daily for 5 days then 100 mg for 5 days then 50 mg for 5 days then stop.      Continue all other cares per primary care provider.     Continue all other medications as reviewed per electronic medical record today.     Safety plan reviewed. To the Emergency Department as needed or call after hours crisis line at [phone] or [phone]. [place] Crisis Text Line. Text [place] to 029633 or Suicide LifeLine Chat: suicidepreventionAgility Design Solutionsline.org/chat    Continue to consider individual psychotherapy for additional support and ongoing development of nonpharmacologic coping skills and strategies.    Schedule an appointment with me in about 6 months or sooner as needed. Call [facility] at [phone] to schedule.    Follow up with primary care provider as planned or for acute medical concerns.    Call the psychiatric nurse line with medication questions or concerns at [phone].    [employer] may be used to communicate with your provider, but this is not intended to be used for emergencies.      []Medication refilled per  Medication Refill in Ambulatory Care  policy.  [x]Medication unable to be refilled by RN due to criteria not met as indicated below:    []Eligibility - not seen in the last year   []Supervision - no future appointment   []Compliance - no shows, cancellations or lapse in therapy   []Verification - order discrepancy   []Controlled medication   [x]Medication not included in policy   []90-day supply request   []Other

## 2022-09-06 ASSESSMENT — SLEEP AND FATIGUE QUESTIONNAIRES
HOW LIKELY ARE YOU TO NOD OFF OR FALL ASLEEP WHILE SITTING INACTIVE IN A PUBLIC PLACE: WOULD NEVER DOZE
HOW LIKELY ARE YOU TO NOD OFF OR FALL ASLEEP WHEN YOU ARE A PASSENGER IN A CAR FOR AN HOUR WITHOUT A BREAK: SLIGHT CHANCE OF DOZING
HOW LIKELY ARE YOU TO NOD OFF OR FALL ASLEEP WHILE SITTING AND READING: MODERATE CHANCE OF DOZING
HOW LIKELY ARE YOU TO NOD OFF OR FALL ASLEEP WHILE SITTING QUIETLY AFTER LUNCH WITHOUT ALCOHOL: SLIGHT CHANCE OF DOZING
HOW LIKELY ARE YOU TO NOD OFF OR FALL ASLEEP WHILE WATCHING TV: MODERATE CHANCE OF DOZING
HOW LIKELY ARE YOU TO NOD OFF OR FALL ASLEEP WHILE LYING DOWN TO REST IN THE AFTERNOON WHEN CIRCUMSTANCES PERMIT: HIGH CHANCE OF DOZING
HOW LIKELY ARE YOU TO NOD OFF OR FALL ASLEEP IN A CAR, WHILE STOPPED FOR A FEW MINUTES IN TRAFFIC: WOULD NEVER DOZE
HOW LIKELY ARE YOU TO NOD OFF OR FALL ASLEEP WHILE SITTING AND TALKING TO SOMEONE: WOULD NEVER DOZE

## 2022-09-13 ENCOUNTER — VIRTUAL VISIT (OUTPATIENT)
Dept: SLEEP MEDICINE | Facility: CLINIC | Age: 62
End: 2022-09-13
Payer: COMMERCIAL

## 2022-09-13 ENCOUNTER — TRANSFERRED RECORDS (OUTPATIENT)
Dept: HEALTH INFORMATION MANAGEMENT | Facility: CLINIC | Age: 62
End: 2022-09-13

## 2022-09-13 VITALS — BODY MASS INDEX: 22.5 KG/M2 | WEIGHT: 140 LBS | HEIGHT: 66 IN

## 2022-09-13 DIAGNOSIS — G25.81 RESTLESS LEG SYNDROME: Primary | ICD-10-CM

## 2022-09-13 PROCEDURE — 99215 OFFICE O/P EST HI 40 MIN: CPT | Mod: 95 | Performed by: PHYSICIAN ASSISTANT

## 2022-09-13 NOTE — NURSING NOTE
Chief Complaint   Patient presents with     Video Visit     Follow up Medication       Patient confirms medications and allergies are accurate via patients echeck in completion, and or denies any changes since last reviewed/verified.     Last flu shot given 12/28/21    Nay Rocha, Virtual Facilitator/DAMIÁN
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
## 2022-09-14 RX ORDER — METHYLPREDNISOLONE SODIUM SUCCINATE 125 MG/2ML
125 INJECTION, POWDER, LYOPHILIZED, FOR SOLUTION INTRAMUSCULAR; INTRAVENOUS
Status: CANCELLED
Start: 2022-09-14

## 2022-09-14 RX ORDER — HEPARIN SODIUM,PORCINE 10 UNIT/ML
5 VIAL (ML) INTRAVENOUS
Status: CANCELLED | OUTPATIENT
Start: 2022-09-14

## 2022-09-14 RX ORDER — EPINEPHRINE 1 MG/ML
0.3 INJECTION, SOLUTION, CONCENTRATE INTRAVENOUS EVERY 5 MIN PRN
Status: CANCELLED | OUTPATIENT
Start: 2022-09-14

## 2022-09-14 RX ORDER — ALBUTEROL SULFATE 90 UG/1
1-2 AEROSOL, METERED RESPIRATORY (INHALATION)
Status: CANCELLED
Start: 2022-09-14

## 2022-09-14 RX ORDER — MEPERIDINE HYDROCHLORIDE 25 MG/ML
25 INJECTION INTRAMUSCULAR; INTRAVENOUS; SUBCUTANEOUS EVERY 30 MIN PRN
Status: CANCELLED | OUTPATIENT
Start: 2022-09-14

## 2022-09-14 RX ORDER — HEPARIN SODIUM (PORCINE) LOCK FLUSH IV SOLN 100 UNIT/ML 100 UNIT/ML
5 SOLUTION INTRAVENOUS
Status: CANCELLED | OUTPATIENT
Start: 2022-09-14

## 2022-09-14 RX ORDER — DIPHENHYDRAMINE HYDROCHLORIDE 50 MG/ML
50 INJECTION INTRAMUSCULAR; INTRAVENOUS
Status: CANCELLED
Start: 2022-09-14

## 2022-09-14 RX ORDER — ALBUTEROL SULFATE 0.83 MG/ML
2.5 SOLUTION RESPIRATORY (INHALATION)
Status: CANCELLED | OUTPATIENT
Start: 2022-09-14

## 2022-09-29 ENCOUNTER — VIRTUAL VISIT (OUTPATIENT)
Dept: BEHAVIORAL HEALTH | Facility: CLINIC | Age: 62
End: 2022-09-29
Payer: COMMERCIAL

## 2022-09-29 ENCOUNTER — VIRTUAL VISIT (OUTPATIENT)
Dept: PSYCHIATRY | Facility: CLINIC | Age: 62
End: 2022-09-29
Payer: COMMERCIAL

## 2022-09-29 DIAGNOSIS — F10.11 ALCOHOL ABUSE, IN REMISSION: ICD-10-CM

## 2022-09-29 DIAGNOSIS — F41.1 GENERALIZED ANXIETY DISORDER: Primary | ICD-10-CM

## 2022-09-29 DIAGNOSIS — G25.81 RESTLESS LEG SYNDROME: ICD-10-CM

## 2022-09-29 DIAGNOSIS — Z86.59 HX OF MAJOR DEPRESSION: ICD-10-CM

## 2022-09-29 DIAGNOSIS — F98.8 ATTENTION DEFICIT DISORDER (ADD) WITHOUT HYPERACTIVITY: ICD-10-CM

## 2022-09-29 PROCEDURE — 90832 PSYTX W PT 30 MINUTES: CPT | Mod: 95 | Performed by: SOCIAL WORKER

## 2022-09-29 PROCEDURE — 99214 OFFICE O/P EST MOD 30 MIN: CPT | Mod: 95 | Performed by: PSYCHIATRY & NEUROLOGY

## 2022-09-29 RX ORDER — DULOXETIN HYDROCHLORIDE 30 MG/1
30 CAPSULE, DELAYED RELEASE ORAL DAILY
Qty: 90 CAPSULE | Refills: 0 | Status: SHIPPED | OUTPATIENT
Start: 2022-09-29 | End: 2022-11-09 | Stop reason: ALTCHOICE

## 2022-09-29 RX ORDER — DEXTROAMPHETAMINE SACCHARATE, AMPHETAMINE ASPARTATE, DEXTROAMPHETAMINE SULFATE AND AMPHETAMINE SULFATE 2.5; 2.5; 2.5; 2.5 MG/1; MG/1; MG/1; MG/1
10 TABLET ORAL DAILY
Qty: 30 TABLET | Refills: 0 | Status: SHIPPED | OUTPATIENT
Start: 2022-09-29 | End: 2022-10-28

## 2022-09-29 RX ORDER — DULOXETIN HYDROCHLORIDE 60 MG/1
60 CAPSULE, DELAYED RELEASE ORAL DAILY
Qty: 90 CAPSULE | Refills: 0 | Status: SHIPPED | OUTPATIENT
Start: 2022-09-29 | End: 2022-10-28

## 2022-09-29 RX ORDER — DEXTROAMPHETAMINE SACCHARATE, AMPHETAMINE ASPARTATE, DEXTROAMPHETAMINE SULFATE AND AMPHETAMINE SULFATE 2.5; 2.5; 2.5; 2.5 MG/1; MG/1; MG/1; MG/1
10 TABLET ORAL DAILY
Qty: 30 TABLET | Refills: 0 | Status: SHIPPED | OUTPATIENT
Start: 2022-10-30 | End: 2022-11-29

## 2022-09-29 RX ORDER — CLONIDINE HYDROCHLORIDE 0.1 MG/1
TABLET ORAL
Qty: 180 TABLET | Refills: 1 | Status: SHIPPED | OUTPATIENT
Start: 2022-09-29 | End: 2022-11-28

## 2022-09-29 RX ORDER — DEXTROAMPHETAMINE SACCHARATE, AMPHETAMINE ASPARTATE, DEXTROAMPHETAMINE SULFATE AND AMPHETAMINE SULFATE 2.5; 2.5; 2.5; 2.5 MG/1; MG/1; MG/1; MG/1
10 TABLET ORAL DAILY
Qty: 30 TABLET | Refills: 0 | Status: SHIPPED | OUTPATIENT
Start: 2022-11-30 | End: 2022-12-30

## 2022-09-29 ASSESSMENT — ANXIETY QUESTIONNAIRES
1. FEELING NERVOUS, ANXIOUS, OR ON EDGE: SEVERAL DAYS
GAD7 TOTAL SCORE: 7
2. NOT BEING ABLE TO STOP OR CONTROL WORRYING: SEVERAL DAYS
3. WORRYING TOO MUCH ABOUT DIFFERENT THINGS: SEVERAL DAYS
IF YOU CHECKED OFF ANY PROBLEMS ON THIS QUESTIONNAIRE, HOW DIFFICULT HAVE THESE PROBLEMS MADE IT FOR YOU TO DO YOUR WORK, TAKE CARE OF THINGS AT HOME, OR GET ALONG WITH OTHER PEOPLE: NOT DIFFICULT AT ALL
7. FEELING AFRAID AS IF SOMETHING AWFUL MIGHT HAPPEN: NOT AT ALL
GAD7 TOTAL SCORE: 7
6. BECOMING EASILY ANNOYED OR IRRITABLE: SEVERAL DAYS
5. BEING SO RESTLESS THAT IT IS HARD TO SIT STILL: NEARLY EVERY DAY

## 2022-09-29 ASSESSMENT — PATIENT HEALTH QUESTIONNAIRE - PHQ9
SUM OF ALL RESPONSES TO PHQ QUESTIONS 1-9: 7
5. POOR APPETITE OR OVEREATING: NOT AT ALL
10. IF YOU CHECKED OFF ANY PROBLEMS, HOW DIFFICULT HAVE THESE PROBLEMS MADE IT FOR YOU TO DO YOUR WORK, TAKE CARE OF THINGS AT HOME, OR GET ALONG WITH OTHER PEOPLE: NOT DIFFICULT AT ALL
SUM OF ALL RESPONSES TO PHQ QUESTIONS 1-9: 7

## 2022-09-29 NOTE — PROGRESS NOTES
[facility]  September 29th, 2022      Behavioral Health Clinician Progress Note    Patient Name: Zari Francisco      Telemedicine Visit: The patient's condition can be safely assessed and treated via synchronous audio and visual telemedicine encounter.      Reason for Telemedicine Visit: Services only offered telehealth    Originating Site (Patient Location): Patient's home    Distant Site (Provider Location): Provider Remote Setting- Home Office    Consent:  The patient/guardian has verbally consented to: the potential risks and benefits of telemedicine (video visit) versus in person care; bill my insurance or make self-payment for services provided; and responsibility for payment of non-covered services.     Mode of Communication:  Video Conference via [employer]    As the provider I attest to compliance with applicable laws and regulations related to telemedicine.         Service Type:  Individual      Service Location:   [facility]     Session Start Time: 07:31am  Session End Time: 7:54am      Session Length: 16 - 37      Attendees: Patient    Visit Activities (Refresh list every visit): [facility] Only    Diagnostic Assessment Date: 01/17/2022  Treatment Plan Review Date: 08/20/2022  See Flowsheets for today's PHQ-9 and PUSHPA-7 results  Previous PHQ-9:   PHQ-9 SCORE 5/19/2022 5/19/2022 9/29/2022   PHQ-9 Total Score - - -   PHQ-9 Total Score MyChart - 2 (Minimal depression) 7 (Mild depression)   PHQ-9 Total Score 2 2 7     Previous PUSHPA-7:   PUSHPA-7 SCORE 11/8/2013 2/25/2022 9/29/2022   Total Score 8 - -   Total Score - 6 (mild anxiety) -   Total Score - 6 7       DATA  Extended Session (60+ minutes): No  Interactive Complexity: No  Crisis: No  [facility] Patient: No    Treatment Objective(s) Addressed in This Session:  Target Behavior(s): Anxiety management    Anxiety: will develop more effective coping skills to manage anxiety symptoms      Current Stressors / Issues    Patient arrived in  "great spirits and reports her mood overall has been \"not great\" she has noticed being more irritable and on edge with decreasing her dosage of Duloxetine. She has also noticed having low energy which she is not used to- she is unsure if this is because of the medication,  Her age or from having COVID in July. She still is getting out of bed fine and able to function but doesn't enjoy having less energy. Currently she is titrating her Duloxetine 60mg one day and 30mg the next. She is taking . 1mg of Klonadine in the morning and one in the evening-doesn't notice any difference with this. She acknowledges she feels better about being on less medication and is happy she continues to wean off dosages. Sleep has been difficult due to her restless legs, she will be starting iron infusions next week and is hopeful this will help. Reports iron levels are significantly low. Her legs wake her up during the night. Appetite is Good  With no concerns. Pt started a DBT podcast and is finding this very helpful and learning mindfulness. Pt received letter that  will not be a medica provider soon which is why she made this appt earlier than expected.           Progress on Treatment Objective(s) / Homework:  Minimal progress - PREPARATION (Decided to change - considering how); Intervened by negotiating a change plan and determining options / strategies for behavior change, identifying triggers, exploring social supports, and working towards setting a date to begin behavior change    Motivational Interviewing    MI Intervention: Expressed Empathy/Understanding, Supported Autonomy, Collaboration, Evocation, Open-ended questions and Reflections: simple and complex     Change Talk Expressed by the Patient: Activation Taking steps    Provider Response to Change Talk: E - Evoked more info from patient about behavior change, A - Affirmed patient's thoughts, decisions, or attempts at behavior change and R - Reflected patient's change " talk    Also provided psychoeducation about behavioral health condition, symptoms, and treatment options    Care Plan review completed: Yes    Medication Review:  Changes to psychiatric medications, see updated Medication List in EPIC.     Medication Compliance:  Yes    Changes in Health Issues:   None reported    Chemical Use Review:   Substance Use: Chemical use reviewed, no active concerns identified      Tobacco Use: No current tobacco use.      Assessment: Current Emotional / Mental Status (status of significant symptoms):  Risk status (Self / Other harm or suicidal ideation)  Patient denies a history of suicidal ideation, suicide attempts, self-injurious behavior, homicidal ideation, homicidal behavior and and other safety concerns  Patient denies current fears or concerns for personal safety.  Patient denies current or recent suicidal ideation or behaviors.  Patient denies current or recent homicidal ideation or behaviors.  Patient denies current or recent self injurious behavior or ideation.  Patient denies other safety concerns.  A safety and risk management plan has not been developed at this time, however patient was encouraged to call [facility] should there be a change in any of these risk factors.    Appearance:   Appropriate   Eye Contact:   Good   Psychomotor Behavior: Normal   Attitude:   Cooperative   Orientation:   All  Speech   Rate / Production: Normal    Volume:  Normal   Mood:    Normal  Affect:    Appropriate   Thought Content:  Clear   Thought Form:  Coherent  Tangential   Insight:    Fair     Diagnoses:  1. Generalized anxiety disorder    2. Attention deficit disorder (ADD) without hyperactivity    3. Restless leg syndrome        Collateral Reports Completed:  Communicated with: Dr. Valentine    Plan: (Homework, other):  Patient was given information about behavioral services and instructed to schedule a follow up appointment with [facility]   .   She was also given information about mental health symptoms and treatment options .  CD Recommendations: No indications of CD issues.  Dawna French, LICSW      ______________________________________________________________________    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    Anticipated number of session for this episode of care: 5-6  Anticipation frequency of session: As determined by Dr. Valentine  Anticipated Duration of each session: 16-37 minutes  Treatment plan will be reviewed in 90 days or when goals have been changed.       MeasurableTreatment Goal(s) related to diagnosis / functional impairment(s)  Goal 1: Patient will work with providers to manage symptoms    I will know I've met my goal when I'm calmer.      Objective #A (Patient Action)  Patient will attend all appointments, take medication as prescribed.  Status: Continued - Date(s): 05/20/2022     Intervention(s)  [facility] will Monitor and assist in overcoming barriers to treatment adherence    Objective #B  Patient will consider all recommendations offered.  Status: Continued -  Date(s): 05/20/2022      Intervention(s)  [facility] will educate patient on treatment options, clarify concerns, work with pt to overcome any resistance to compliance.      Patient has reviewed and agreed to the above plan.      Dawna French, [facility] Hospital[facility]  September 29, 2022

## 2022-09-29 NOTE — Clinical Note
Please call this patient to get them scheduled for a follow-up visit in 3 months. Please schedule with me and the [facility], RILEY Colmenares (I believe patient would really like Almita since really liked Dawna today over Ervin but I don't typically work with Dawna). Thanks!
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
## 2022-09-29 NOTE — PATIENT INSTRUCTIONS
Treatment Plan:  Discontinued trazodone since no longer using.  Continue Adderall 10 mg daily as needed for ADHD.  Continue clonidine 0.15-0.2 mg at bedtime for anxiety, sleep, irritability, ADHD. Can split your dose and take some in the AM or afternoon and rest at bedtime, just do not exceed total daily dose of 0.2 mg.    Decrease/Discontinue duloxetine after off of lamotrigine for two weeks:   For weeks 1-2: take 90 mg for two days then 60 mg for one day then 90 mg for two days then 60 mg for one day, etc.  For weeks 3-4: alternate 60 mg every other day with 90 mg  For weeks 4-6: take 60 mg for two days then 90 mg for one day then 60 mg for two days then 90 mg for one day, etc  For week 7: take 60 mg daily  For weeks 8-9: take 60 mg for two days then 30 mg for one day then 60 mg for two days then 30 mg for one day, etc  For weeks 10-11: alternate 60 mg every other day with 30 mg  For weeks 12-13: take 30 mg for two days then 60 mg for one day then 30 mg for two days then 60 mg for one day, etc  For week 14: take 30 mg daily  For weeks 15-16: take 30 mg for two days then no duloxetine for one day then 30 mg for two days then no duloxetine for one day, etc  For weeks 17-18: alternate 30 mg every other day with no duloxetine  For weeks 19-20: take 30 mg one day and then two days no duloxetine then 30 mg one day then two days no medication  Week 21: Then stop duloxetine  Continue all other cares per primary care provider.   Continue all other medications as reviewed per electronic medical record today.   Safety plan reviewed. To the Emergency Department as needed or call after hours crisis line at [phone] or [phone]. [place] Crisis Text Line. Text [place] to 894134 or Suicide LifeLine Chat: suicidepreventionlifeline.org/chat  Continue to consider individual psychotherapy for additional support and ongoing development of nonpharmacologic coping skills and strategies.  Schedule an appointment with me in about 3  months or sooner as needed. Call [facility] at [phone] to schedule.  Follow up with primary care provider as planned or for acute medical concerns.  Call the psychiatric nurse line with medication questions or concerns at [phone].  MyChart may be used to communicate with your provider, but this is not intended to be used for emergencies.    Have previously discussed risks of stimulant medication including, but not limited to, decreased appetite, risk of tics (and that they may be lasting), trouble sleeping, cardiac risks such as increased heart rate and blood pressure, and rare risk of sudden cardiac death.  Also risk of addiction/tolerance/dependence.    Have previously discussed Lamictal (lamotrigine) can cause serious rashes including Madison-Martin syndrome which may requiring hospitalization and discontinuation of treatment. If any signs of a rash occur, please see your Primary Care Provider or a dermatologist immediately.

## 2022-10-09 ENCOUNTER — HEALTH MAINTENANCE LETTER (OUTPATIENT)
Age: 62
End: 2022-10-09

## 2022-10-17 NOTE — NURSING NOTE
Pt called and message left for pt to call back to schedule 3 month follow up.    HUGO Pinon

## 2022-10-18 ENCOUNTER — TRANSFERRED RECORDS (OUTPATIENT)
Dept: HEALTH INFORMATION MANAGEMENT | Facility: CLINIC | Age: 62
End: 2022-10-18

## 2022-10-21 ENCOUNTER — TELEPHONE (OUTPATIENT)
Dept: SLEEP MEDICINE | Facility: CLINIC | Age: 62
End: 2022-10-21

## 2022-10-21 DIAGNOSIS — G25.81 RESTLESS LEG SYNDROME: Primary | ICD-10-CM

## 2022-10-21 NOTE — TELEPHONE ENCOUNTER
"See patient message below. At last visit provider wrote:  \"Impression/Plan:  Severe restless leg syndrome-  She is on Gabapentin 600 mg at betime and Ropinirole 0.5 mg about 4 PM and 1 mg at bedtime. Her symptoms are improved but not remedied completely.   Ferritin was 34 ng/ml on 10/4/2021. She does not tolerate PO iron due to severe constipation.   We reviewed options.   Patient will benefit from iron infusion and orders placed. Reviewed side effects and risks of iron infusion.   Will check Ferritin\"  Edie Vogel CMA    "

## 2022-10-21 NOTE — TELEPHONE ENCOUNTER
----- Message from Svitlana Quesada RN sent at 10/20/2022  7:42 AM CDT -----  Regarding: FW: VENOFER    ----- Message -----  From: Vanessa Gross  Sent: 10/18/2022   8:09 AM CDT  To: Svitlana Quesada RN, IVANIA Marmolejo  Subject: BRIAN                                          Good Morning,   I am reaching out to let you know that we attempted to see if this would be covered under RX benefit, this however is not an option.  The pt would have to pay for this out of pocket which gets pretty expensive. Please let me know how you would like to proceed. Would it be ok to close the referral at this time?    Vanessa

## 2022-10-27 ENCOUNTER — TELEPHONE (OUTPATIENT)
Dept: INFUSION THERAPY | Facility: CLINIC | Age: 62
End: 2022-10-27

## 2022-10-27 NOTE — TELEPHONE ENCOUNTER
Pt came in for iron infusions. Per finance, her infusions were cancelled. This is the message I received:     [12:06 PM] Vanessa Gross  Well the last message I am seeing was her provider advising us to close ref since she doesnt meet policy    [12:06 PM] Vanessa Gross  PER IN BASKET MESSAGE FROM PROVIDER- Thanks for all your efforts. The referral can be closed for now.  [place]   / CLOSING REFERRAL FOR NOW    Patient was upset and said she has been working on this for months. She walked out of clinic.     [place]

## 2022-10-27 NOTE — TELEPHONE ENCOUNTER
Writer called patient back to let her know Cynthia Braxton PA-C will call patient tomorrow, 10/28/22. Patient had nothing further to mention

## 2022-10-28 ENCOUNTER — VIRTUAL VISIT (OUTPATIENT)
Dept: FAMILY MEDICINE | Facility: OTHER | Age: 62
End: 2022-10-28
Payer: COMMERCIAL

## 2022-10-28 DIAGNOSIS — J22 LRTI (LOWER RESPIRATORY TRACT INFECTION): Primary | ICD-10-CM

## 2022-10-28 DIAGNOSIS — J01.90 ACUTE NON-RECURRENT SINUSITIS, UNSPECIFIED LOCATION: ICD-10-CM

## 2022-10-28 PROCEDURE — 99214 OFFICE O/P EST MOD 30 MIN: CPT | Mod: 95 | Performed by: PHYSICIAN ASSISTANT

## 2022-10-28 RX ORDER — BENZONATATE 100 MG/1
100 CAPSULE ORAL 3 TIMES DAILY PRN
Qty: 30 CAPSULE | Refills: 0 | Status: SHIPPED | OUTPATIENT
Start: 2022-10-28 | End: 2022-12-26

## 2022-10-28 RX ORDER — GABAPENTIN 400 MG/1
400 CAPSULE ORAL AT BEDTIME
Qty: 30 CAPSULE | Refills: 3 | Status: SHIPPED | OUTPATIENT
Start: 2022-10-28 | End: 2022-10-28

## 2022-10-28 RX ORDER — GABAPENTIN 400 MG/1
800 CAPSULE ORAL AT BEDTIME
Qty: 60 CAPSULE | Refills: 3 | Status: SHIPPED | OUTPATIENT
Start: 2022-10-28 | End: 2022-11-15

## 2022-10-28 RX ORDER — ALBUTEROL SULFATE 90 UG/1
2 AEROSOL, METERED RESPIRATORY (INHALATION) EVERY 6 HOURS
Qty: 18 G | Refills: 0 | Status: SHIPPED | OUTPATIENT
Start: 2022-10-28 | End: 2022-12-20

## 2022-10-28 RX ORDER — DOXYCYCLINE 100 MG/1
100 CAPSULE ORAL 2 TIMES DAILY
Qty: 20 CAPSULE | Refills: 0 | Status: SHIPPED | OUTPATIENT
Start: 2022-10-28 | End: 2022-11-07

## 2022-10-28 RX ORDER — PREDNISONE 20 MG/1
40 TABLET ORAL DAILY
Qty: 10 TABLET | Refills: 0 | Status: SHIPPED | OUTPATIENT
Start: 2022-10-28 | End: 2022-11-02

## 2022-10-28 NOTE — TELEPHONE ENCOUNTER
I spoke with the patient.  She is reporting that she spoke with her insurance and they are telling her that it will be covered. She is upset that her appointment was canceled without notification. She will call the infusion center and discuss her findings with them. The order for IV iron is still active.     Restless leg syndrome is not controlled on Gabapentin 600 mg. Will increase it Gabapentin 800 mg while we await outcome of IV iron.

## 2022-10-28 NOTE — PROGRESS NOTES
Zari is a 62 year old who is being evaluated via a billable video visit.      How would you like to obtain your AVS? MyChart  If the video visit is dropped, the invitation should be resent by: Text to cell phone: [phone]  Will anyone else be joining your video visit? No          Assessment & Plan     LRTI (lower respiratory tract infection)  Acute non-recurrent sinusitis, unspecified location  Given the severity of symptoms and length of symptoms at this time suspect bacterial etiology as well as inflammation and bronchoconstriction.  Will start her on Doxycycline which will cover for both upper and lower respiratory concerns, will also start her on Prednisone which she has tolerated in the past, reminded of potential side effects. She also has used albuterol in the past despite the allergy drug interaction coming up, reminded how to use and potential side effects. Can use Tessalon PRN for cough as well.   - doxycycline monohydrate (MONODOX) 100 MG capsule; Take 1 capsule (100 mg) by mouth 2 times daily for 10 days  - predniSONE (DELTASONE) 20 MG tablet; Take 2 tablets (40 mg) by mouth daily for 5 days  - benzonatate (TESSALON) 100 MG capsule; Take 1 capsule (100 mg) by mouth 3 times daily as needed for cough  - albuterol (PROAIR HFA/PROVENTIL HFA/VENTOLIN HFA) 108 (90 Base) MCG/ACT inhaler; Inhale 2 puffs into the lungs every 6 hours      Return in about 5 days (around 11/2/2022) for If not improving, sooner if worse or new concerns.    Options for treatment and follow-up care were reviewed with the patient and/or guardian. Patient and/or guardian engaged in the decision making process and verbalized understanding of the options discussed and agreed with the final plan.    ОЛЬГА [facility]   Zari is a 62 year old, presenting for the following health issues:  No chief complaint on file.      History of Present Illness       Reason for visit:  Cough and  general feeling of being sick  Symptom onset:  3-4 weeks ago  Symptoms include:  Achy, cough, runny nose  Symptom intensity:  Severe  Symptom progression:  Staying the same  Had these symptoms before:  Yes  Has tried/received treatment for these symptoms:  Yes  Previous treatment was successful:  Yes  Prior treatment description:  Antibiotics for sinus infection  What makes it better:  No    She eats 2-3 servings of fruits and vegetables daily.She consumes 1 sweetened beverage(s) daily.She exercises with enough effort to increase her heart rate 30 to 60 minutes per day.  She exercises with enough effort to increase her heart rate 4 days per week.   She is taking medications regularly.       - Started about 3 weeks ago.   - Started with a cough and achiness. Has runny nose, some congestion but started to run down the back of the throat.   - No symptoms have not resolved.   - No new symptoms have developed.   - No history of lung diseases.   - Has tried: Tylenol, Advil.      Review of Systems   Constitutional, HEENT, cardiovascular, pulmonary, gi and gu systems are negative, except as otherwise noted.      Objective           Vitals:  No vitals were obtained today due to virtual visit.    Physical Exam   GENERAL: Healthy, alert and no distress  EYES: Eyes grossly normal to inspection.  No discharge or erythema, or obvious scleral/conjunctival abnormalities.  RESP: Dry constant cough throughout the visit, No audible wheeze, or visible cyanosis.  No visible retractions or increased work of breathing.    SKIN: Visible skin clear. No significant rash, abnormal pigmentation or lesions.  NEURO: Cranial nerves grossly intact.  Mentation and speech appropriate for age.  PSYCH: Mentation appears normal, affect normal/bright, judgement and insight intact, normal speech and appearance well-groomed.            Video-Visit Details    Video Start Time: 11:43 AM    Type of service:  Video Visit    Video End Time:11:52  UCHE    Originating Location (pt. Location): Home     Distant Location (provider location):  On-site    Platform used for Video Visit: Kasey

## 2022-11-02 DIAGNOSIS — Z13.1 SCREENING FOR DIABETES MELLITUS (DM): ICD-10-CM

## 2022-11-02 DIAGNOSIS — Z13.0 SCREENING FOR DEFICIENCY ANEMIA: ICD-10-CM

## 2022-11-02 DIAGNOSIS — Z13.220 SCREENING FOR HYPERLIPIDEMIA: ICD-10-CM

## 2022-11-02 DIAGNOSIS — G25.81 RESTLESS LEG SYNDROME: Primary | ICD-10-CM

## 2022-11-02 DIAGNOSIS — Z11.1 SCREENING EXAMINATION FOR PULMONARY TUBERCULOSIS: ICD-10-CM

## 2022-11-02 RX ORDER — ROPINIROLE 1 MG/1
TABLET, FILM COATED ORAL
Qty: 135 TABLET | Refills: 0 | Status: SHIPPED | OUTPATIENT
Start: 2022-11-02 | End: 2023-01-30

## 2022-11-02 NOTE — TELEPHONE ENCOUNTER
Patient is overdue for annual physical exam and fasting labs.  Please schedule for both  Refill is sent as requested.

## 2022-11-03 NOTE — TELEPHONE ENCOUNTER
Scheduled an appointment for annual wellness 2/17/22. Place orders for fasting labs so she can get those done before her visit.    Patient is also needing an order for TB Gold test for work. Could you order that for her please?  MyChart patient once orders are placed.     Dawna BARNES  Patient Representative  [phone]

## 2022-11-07 DIAGNOSIS — N95.2 ATROPHIC VAGINITIS: ICD-10-CM

## 2022-11-07 RX ORDER — CONJUGATED ESTROGENS 0.62 MG/G
CREAM VAGINAL
Qty: 30 G | Refills: 3 | Status: SHIPPED | OUTPATIENT
Start: 2022-11-07 | End: 2023-06-02

## 2022-11-08 ENCOUNTER — E-VISIT (OUTPATIENT)
Dept: URGENT CARE | Facility: CLINIC | Age: 62
End: 2022-11-08
Payer: COMMERCIAL

## 2022-11-08 DIAGNOSIS — R05.1 ACUTE COUGH: Primary | ICD-10-CM

## 2022-11-08 PROCEDURE — 99207 PR NON-BILLABLE SERV PER CHARTING: CPT | Performed by: EMERGENCY MEDICINE

## 2022-11-08 NOTE — PATIENT INSTRUCTIONS
Dear Zari Francisco,    We are sorry you are not feeling well. Based on the responses you provided, it is recommended that you be seen in-person in urgent care so we can better evaluate your symptoms. Please click here to find the nearest urgent care location to you.   You will not be charged for this Visit. Thank you for trusting us with your care.    Jai Hodges MD

## 2022-11-09 ENCOUNTER — VIRTUAL VISIT (OUTPATIENT)
Dept: PSYCHIATRY | Facility: CLINIC | Age: 62
End: 2022-11-09
Payer: COMMERCIAL

## 2022-11-09 ENCOUNTER — VIRTUAL VISIT (OUTPATIENT)
Dept: BEHAVIORAL HEALTH | Facility: CLINIC | Age: 62
End: 2022-11-09
Payer: COMMERCIAL

## 2022-11-09 DIAGNOSIS — F10.11 ALCOHOL ABUSE, IN REMISSION: ICD-10-CM

## 2022-11-09 DIAGNOSIS — F39 MOOD DISORDER (H): ICD-10-CM

## 2022-11-09 DIAGNOSIS — Z86.59 HISTORY OF MAJOR DEPRESSION: ICD-10-CM

## 2022-11-09 DIAGNOSIS — F98.8 ATTENTION DEFICIT DISORDER (ADD) WITHOUT HYPERACTIVITY: ICD-10-CM

## 2022-11-09 DIAGNOSIS — F10.21 HISTORY OF ALCOHOL DEPENDENCE (H): ICD-10-CM

## 2022-11-09 DIAGNOSIS — F41.1 GENERALIZED ANXIETY DISORDER: Primary | ICD-10-CM

## 2022-11-09 DIAGNOSIS — Z86.59 HX OF MAJOR DEPRESSION: ICD-10-CM

## 2022-11-09 PROCEDURE — 99214 OFFICE O/P EST MOD 30 MIN: CPT | Mod: 95 | Performed by: PSYCHIATRY & NEUROLOGY

## 2022-11-09 PROCEDURE — 90832 PSYTX W PT 30 MINUTES: CPT | Mod: 95 | Performed by: PSYCHOLOGIST

## 2022-11-09 ASSESSMENT — ANXIETY QUESTIONNAIRES
4. TROUBLE RELAXING: SEVERAL DAYS
7. FEELING AFRAID AS IF SOMETHING AWFUL MIGHT HAPPEN: NOT AT ALL
IF YOU CHECKED OFF ANY PROBLEMS ON THIS QUESTIONNAIRE, HOW DIFFICULT HAVE THESE PROBLEMS MADE IT FOR YOU TO DO YOUR WORK, TAKE CARE OF THINGS AT HOME, OR GET ALONG WITH OTHER PEOPLE: SOMEWHAT DIFFICULT
2. NOT BEING ABLE TO STOP OR CONTROL WORRYING: NOT AT ALL
GAD7 TOTAL SCORE: 9
GAD7 TOTAL SCORE: 9
3. WORRYING TOO MUCH ABOUT DIFFERENT THINGS: MORE THAN HALF THE DAYS
1. FEELING NERVOUS, ANXIOUS, OR ON EDGE: NEARLY EVERY DAY
GAD7 TOTAL SCORE: 9
8. IF YOU CHECKED OFF ANY PROBLEMS, HOW DIFFICULT HAVE THESE MADE IT FOR YOU TO DO YOUR WORK, TAKE CARE OF THINGS AT HOME, OR GET ALONG WITH OTHER PEOPLE?: SOMEWHAT DIFFICULT
5. BEING SO RESTLESS THAT IT IS HARD TO SIT STILL: NOT AT ALL
6. BECOMING EASILY ANNOYED OR IRRITABLE: NEARLY EVERY DAY
7. FEELING AFRAID AS IF SOMETHING AWFUL MIGHT HAPPEN: NOT AT ALL

## 2022-11-09 ASSESSMENT — PATIENT HEALTH QUESTIONNAIRE - PHQ9
SUM OF ALL RESPONSES TO PHQ QUESTIONS 1-9: 11
10. IF YOU CHECKED OFF ANY PROBLEMS, HOW DIFFICULT HAVE THESE PROBLEMS MADE IT FOR YOU TO DO YOUR WORK, TAKE CARE OF THINGS AT HOME, OR GET ALONG WITH OTHER PEOPLE: SOMEWHAT DIFFICULT
SUM OF ALL RESPONSES TO PHQ QUESTIONS 1-9: 11

## 2022-11-09 NOTE — PATIENT INSTRUCTIONS
Treatment Plan:  Continue Adderall 10 mg daily as needed for ADHD.  Continue clonidine 0.15-0.2 mg at bedtime for anxiety, sleep, irritability, ADHD. Can split your dose and take some in the AM or afternoon and rest at bedtime, just do not exceed total daily dose of 0.2 mg.    Start fluoxetine/Prozac 20 mg daily to help with duloxetine discontinuation symptoms and mood stabilization.  Referral placed for psychological testing focused on MMPI administration/personality testing.  Continue all other cares per primary care provider.   Continue all other medications as reviewed per electronic medical record today.   Safety plan reviewed. To the Emergency Department as needed or call after hours crisis line at [phone] or [phone]. [place] Crisis Text Line. Text [place] to 705992 or Suicide LifeLine Chat: suicidepreventionlifeline.org/chat  Continue to consider individual psychotherapy/DBT for additional support and ongoing development of nonpharmacologic coping skills and strategies.  Schedule an appointment with me in about 2 months or sooner as needed. Call [facility] at [phone] to schedule.  Follow up with primary care provider as planned or for acute medical concerns.  Call the psychiatric nurse line with medication questions or concerns at [phone].  MyChart may be used to communicate with your provider, but this is not intended to be used for emergencies.    Have previously discussed risks of stimulant medication including, but not limited to, decreased appetite, risk of tics (and that they may be lasting), trouble sleeping, cardiac risks such as increased heart rate and blood pressure, and rare risk of sudden cardiac death.  Also risk of addiction/tolerance/dependence.

## 2022-11-09 NOTE — PROGRESS NOTES
[facility]  November 9, 2022      Behavioral Health Clinician Progress Note    Patient Name: Zari Francisco      Telemedicine Visit: The patient's condition can be safely assessed and treated via synchronous audio and visual telemedicine encounter.      Reason for Telemedicine Visit: Services only offered telehealth    Originating Site (Patient Location): Patient's home    Distant Site (Provider Location): Provider Remote Setting- Home Office    Consent:  The patient/guardian has verbally consented to: the potential risks and benefits of telemedicine (video visit) versus in person care; bill my insurance or make self-payment for services provided; and responsibility for payment of non-covered services.     Mode of Communication:  Video Conference via FanH[employer]    As the provider I attest to compliance with applicable laws and regulations related to telemedicine.         Service Type:  Individual      Service Location:   [facility]     Session Start Time: 11:00 am  Session End Time: 11:25 am      Session Length: 16 - 37      Attendees: Patient    Visit Activities (Refresh list every visit): [facility] Only    Diagnostic Assessment Date: 01/17/2022  Treatment Plan Review Date: 08/20/2022  See Flowsheets for today's PHQ-9 and PUSHPA-7 results  Previous PHQ-9:   PHQ-9 SCORE 5/19/2022 9/29/2022 11/9/2022   PHQ-9 Total Score - - -   PHQ-9 Total Score MyChart 2 (Minimal depression) 7 (Mild depression) 11 (Moderate depression)   PHQ-9 Total Score 2 7 11     Previous PUSHPA-7:   PUSHPA-7 SCORE 2/25/2022 9/29/2022 11/9/2022   Total Score - - -   Total Score 6 (mild anxiety) - 9 (mild anxiety)   Total Score 6 7 9       DATA  Extended Session (60+ minutes): No  Interactive Complexity: No  Crisis: No  [facility] Patient: No    Treatment Objective(s) Addressed in This Session:  Target Behavior(s): Anxiety management    Anxiety: will develop more effective coping skills to manage anxiety  "symptoms    Current Stressors / Issues  Reported that she is not doing well saying \"I'm raging at the smallest thing.\" She described a situation at a restaurant in which she yelled at the managers about a fee for using a credit card. She is starting DBT soon ([facility]) and spoke about the \"huge commitment\" of doing group and the individual therapy. She spoke about the struggle she has with groups in general. 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 She spoke about feeling a lot of physical withdrawal symptoms from Cymbalta (less than 2 weeks). She was encouraged to consider her distress as motivation to stay engaged with therapy and accept the discomfort she is likely to endure. She agreed with this approach and that she was willing to try something new.       09/29/2022:  Patient arrived in great spirits and reports her mood overall has been \"not great\" she has noticed being more irritable and on edge with decreasing her dosage of Duloxetine. She has also noticed having low energy which she is not used to- she is unsure if this is because of the medication,  Her age or from having COVID in July. She still is getting out of bed fine and able to function but doesn't enjoy having less energy. Currently she is titrating her Duloxetine 60mg one day and 30mg the next. She is taking . 1mg of Klonadine in the morning and one in the evening-doesn't notice any difference with this. She acknowledges she feels better about being on less medication and is happy she continues to wean off dosages. Sleep has been difficult due to her restless legs, she will be starting iron infusions next week and is hopeful this will help. Reports iron levels are significantly low. Her legs wake her up during the night. Appetite is Good  With no concerns. Pt started a DBT podcast and is finding this very helpful and learning mindfulness. Pt received letter that  will not be " a medica provider soon which is why she made this appt earlier than expected.       Progress on Treatment Objective(s) / Homework:  Worsening - PREPARATION (Decided to change - considering how); Intervened by negotiating a change plan and determining options / strategies for behavior change, identifying triggers, exploring social supports, and working towards setting a date to begin behavior change    Motivational Interviewing    MI Intervention: Expressed Empathy/Understanding, Supported Autonomy, Collaboration, Evocation, Open-ended questions and Reflections: simple and complex     Change Talk Expressed by the Patient: Activation Taking steps    Provider Response to Change Talk: E - Evoked more info from patient about behavior change, A - Affirmed patient's thoughts, decisions, or attempts at behavior change and R - Reflected patient's change talk    Also provided psychoeducation about behavioral health condition, symptoms, and treatment options    Care Plan review completed: Yes    Medication Review:  Changes to psychiatric medications, see updated Medication List in EPIC.     Medication Compliance:  Yes    Changes in Health Issues:   None reported    Chemical Use Review:   Substance Use: Chemical use reviewed, no active concerns identified      Tobacco Use: No current tobacco use.      Assessment: Current Emotional / Mental Status (status of significant symptoms):  Risk status (Self / Other harm or suicidal ideation)  Patient denies a history of suicidal ideation, suicide attempts, self-injurious behavior, homicidal ideation, homicidal behavior and and other safety concerns  Patient denies current fears or concerns for personal safety.  Patient denies current or recent suicidal ideation or behaviors.  Patient denies current or recent homicidal ideation or behaviors.  Patient denies current or recent self injurious behavior or ideation.  Patient denies other safety concerns.  A safety and risk management plan has  not been developed at this time, however patient was encouraged to call [place] [phone] should there be a change in any of these risk factors.    Appearance:   Appropriate   Eye Contact:   Good   Psychomotor Behavior: Normal   Attitude:   Cooperative   Orientation:   All  Speech   Rate / Production: Normal    Volume:  Normal   Mood:    Normal  Affect:    Appropriate   Thought Content:  Clear   Thought Form:  Coherent  Tangential   Insight:    Fair     Diagnoses:  1. Generalized anxiety disorder    2. Attention deficit disorder (ADD) without hyperactivity    3. History of alcohol dependence (H)    4. History of major depression        Collateral Reports Completed:  Communicated with: Dr. Valentine    Plan: (Homework, other):  Patient was given information about behavioral services and instructed to schedule a follow up appointment with the [facility] in conjunction with next [facility] appointment.   .  She was also given information about mental health symptoms and treatment options .  CD Recommendations: No indications of CD issues.  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    Anticipated number of session for this episode of care: 5-6  Anticipation frequency of session: As determined by Dr. Valentine  Anticipated Duration of each session: 16-37 minutes  Treatment plan will be reviewed in 90 days or when goals have been changed.       MeasurableTreatment Goal(s) related to diagnosis / functional impairment(s)  Goal 1: Patient will work with providers to manage symptoms    I will know I've met my goal when I'm calmer.      Objective #A (Patient Action)  Patient will attend all appointments, take medication as prescribed.  Status: Continued - Date(s): 05/20/2022     Intervention(s)  [facility] will Monitor and assist in overcoming barriers to treatment adherence    Objective #B  Patient will consider all recommendations offered.  Status: Continued - Date(s): 05/20/2022      Intervention(s)  [facility] will educate patient on treatment options, clarify concerns, work with pt to overcome any resistance to compliance.      Patient has reviewed and agreed to the above plan.      Mike Olsen PsyD  11/09/2022
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
## 2022-11-23 ENCOUNTER — OFFICE VISIT (OUTPATIENT)
Dept: ORTHOPEDICS | Facility: CLINIC | Age: 62
End: 2022-11-23
Payer: COMMERCIAL

## 2022-11-23 ENCOUNTER — ANCILLARY PROCEDURE (OUTPATIENT)
Dept: GENERAL RADIOLOGY | Facility: CLINIC | Age: 62
End: 2022-11-23
Attending: PREVENTIVE MEDICINE
Payer: COMMERCIAL

## 2022-11-23 DIAGNOSIS — M54.16 LUMBAR RADICULAR PAIN: ICD-10-CM

## 2022-11-23 DIAGNOSIS — M51.16 LUMBAR DISC HERNIATION WITH RADICULOPATHY: ICD-10-CM

## 2022-11-23 DIAGNOSIS — M54.16 LUMBAR RADICULAR PAIN: Primary | ICD-10-CM

## 2022-11-23 PROCEDURE — 99214 OFFICE O/P EST MOD 30 MIN: CPT | Performed by: PREVENTIVE MEDICINE

## 2022-11-23 PROCEDURE — 73502 X-RAY EXAM HIP UNI 2-3 VIEWS: CPT | Mod: RT | Performed by: RADIOLOGY

## 2022-11-23 RX ORDER — METHYLPREDNISOLONE 4 MG
TABLET, DOSE PACK ORAL
Qty: 21 TABLET | Refills: 0 | Status: SHIPPED | OUTPATIENT
Start: 2022-11-23 | End: 2022-12-26
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
## 2022-11-26 ENCOUNTER — HEALTH MAINTENANCE LETTER (OUTPATIENT)
Age: 62
End: 2022-11-26

## 2022-11-28 ENCOUNTER — MYC MEDICAL ADVICE (OUTPATIENT)
Dept: ORTHOPEDICS | Facility: CLINIC | Age: 62
End: 2022-11-28

## 2022-11-28 ENCOUNTER — MYC MEDICAL ADVICE (OUTPATIENT)
Dept: SLEEP MEDICINE | Facility: CLINIC | Age: 62
End: 2022-11-28

## 2022-11-28 ENCOUNTER — MYC MEDICAL ADVICE (OUTPATIENT)
Dept: PSYCHIATRY | Facility: CLINIC | Age: 62
End: 2022-11-28

## 2022-11-28 DIAGNOSIS — F41.1 GENERALIZED ANXIETY DISORDER: ICD-10-CM

## 2022-11-28 DIAGNOSIS — F98.8 ATTENTION DEFICIT DISORDER (ADD) WITHOUT HYPERACTIVITY: ICD-10-CM

## 2022-11-28 RX ORDER — CLONIDINE HYDROCHLORIDE 0.2 MG/1
0.2 TABLET ORAL DAILY
Qty: 90 TABLET | Refills: 1 | Status: SHIPPED | OUTPATIENT
Start: 2022-11-28 | End: 2023-01-05 | Stop reason: DRUGHIGH

## 2022-11-29 RX ORDER — FERROUS GLUCONATE 324(38)MG
324 TABLET ORAL
Qty: 30 TABLET | Refills: 1 | Status: SHIPPED | OUTPATIENT
Start: 2022-11-29 | End: 2023-01-30

## 2022-12-01 ENCOUNTER — TRANSFERRED RECORDS (OUTPATIENT)
Dept: HEALTH INFORMATION MANAGEMENT | Facility: CLINIC | Age: 62
End: 2022-12-01

## 2022-12-20 DIAGNOSIS — J22 LRTI (LOWER RESPIRATORY TRACT INFECTION): ICD-10-CM

## 2022-12-20 DIAGNOSIS — J01.90 ACUTE NON-RECURRENT SINUSITIS, UNSPECIFIED LOCATION: ICD-10-CM

## 2022-12-20 RX ORDER — ALBUTEROL SULFATE 90 UG/1
2 AEROSOL, METERED RESPIRATORY (INHALATION) EVERY 6 HOURS
Qty: 8.5 G | Refills: 0 | Status: SHIPPED | OUTPATIENT
Start: 2022-12-20 | End: 2023-06-02

## 2022-12-26 ENCOUNTER — VIRTUAL VISIT (OUTPATIENT)
Dept: PHARMACY | Facility: CLINIC | Age: 62
End: 2022-12-26
Payer: COMMERCIAL

## 2022-12-26 DIAGNOSIS — F32.A ANXIETY AND DEPRESSION: Primary | ICD-10-CM

## 2022-12-26 DIAGNOSIS — F41.9 ANXIETY AND DEPRESSION: Primary | ICD-10-CM

## 2022-12-26 DIAGNOSIS — G25.81 RESTLESS LEG SYNDROME: ICD-10-CM

## 2022-12-26 DIAGNOSIS — F98.8 ATTENTION DEFICIT DISORDER (ADD) WITHOUT HYPERACTIVITY: ICD-10-CM

## 2022-12-26 PROCEDURE — 99607 MTMS BY PHARM ADDL 15 MIN: CPT | Performed by: PHARMACIST

## 2022-12-26 PROCEDURE — 99606 MTMS BY PHARM EST 15 MIN: CPT | Performed by: PHARMACIST

## 2022-12-26 RX ORDER — MULTIVIT WITH MINERALS/LUTEIN
1000 TABLET ORAL DAILY
COMMUNITY
End: 2023-06-02

## 2022-12-26 NOTE — PATIENT INSTRUCTIONS
"Recommendations from today's MTM visit:                                                         No medication changes recommended today.    Follow-up: 3 months.    It was great speaking with you today.  I value your experience and would be very thankful for your time in providing feedback in our clinic survey. In the next few days, you may receive an email or text message from [employer] with a link to a survey related to your  clinical pharmacist.\"     To schedule another MTM appointment, please call the clinic directly or you may call the MTM scheduling line at [phone] or toll-free at [phone].     My Clinical Pharmacist's contact information:                                                      Please feel free to contact me with any questions or concerns you have.      Nay Francois, Pharm.D, [facility]  Medication Therapy Management Pharmacist      "

## 2022-12-26 NOTE — PROGRESS NOTES
Medication Therapy Management (MTM) Encounter    ASSESSMENT:                            Medication Adherence/Access: No issues identified    RLS: Continue oral iron therapy. Discussed increasing fiber in diet and staying well hydrated. Discussed constipation from oral iron therapy can improve with time. Studies are mixed on how much Vitamin C will enhance absorption but patient is tolerating and can continue to take Vitamin C. Due for updated labs at the end of January.     ADHD/Anxiety: Stable. Patient is planning on tapering off of fluoxetine at next appointment with psychiatry in January. Follows closely with psychiatry.     PLAN:                            No medication changes recommended today.    Follow-up: 3 months.    SUBJECTIVE/OBJECTIVE:                          Zari Francisco is a 62 year old female called for a follow up visit. She was referred to me from Susanna Dyer Follow up from 7/19/2022.    Reason for visit: RLS    Allergies/ADRs: Reviewed in chart  Past Medical History: Reviewed in chart  Tobacco: She reports that she has never smoked. She has never used smokeless tobacco.  Alcohol: history of alcohol dependence; in recovery, 31 years  Caffeine: 1/2 caff 1/2 decaff  Activity: works out with  twice a week, rides horses    Medication Adherence/Access: Per patient, misses medication 0 times per week.   Medication barriers: none.   The patient fills medications at [facility]: NO, fills medications at [facility].    RLS: current therapy includes gabapentin 700mg at bedtime (is using up her supply of 300mg and 100mg tablets),  ropinirole 0.5mg during at 2pm and 1mg an hour before bedtime (had tried 0.5mg twice daily but the 0.5mg was not enough to control her symptoms). Bedtime is usually around 10pm. Patient is following with sleep clinic.  Cannot take iron because it causes constipation. Iron infusions were denied due to iron infusions not having FDA approval for RLS. Patient did  restart oral ferrous gluconate 324mg daily (has been on for a month). She is also taking with Vitamin C 1000mg daily. Is having constipation again with the ferrous gluconate. She has been switching up her bran cereal and that has been helpful. Has been taking Miralax 1 capful every 2-3 days. If she takes daily, she has loose stools. Does report that she drinks plenty of water. Will take senna in addition if needed. She hasn't noticed any changes in her RLS symptoms yet.     Lab Results   Component Value Date    ALBERT 34 10/04/2021     ADHD/Anxiety: current medications include adderall 10mg daily, fluoxetine 20mg daily, clonidine 0.2mg daily taking at night Follows with Rani Valentine. Duloxetine was tapered and she experienced withdrawal symptoms, anxiety and anger was out of control. Is doing better now. Fluoxetine was started as a bridge when she was tapering off of duloxetine. Started DBT and is in a 1 year program. Has been doing this now for 2 weeks. Wants to get off of medications if she can.     Today's Vitals: There were no vitals taken for this visit.  ----------------    I spent 21 minutes with this patient today. All changes were made via collaborative practice agreement with Susanna Dyer A copy of the visit note was provided to the patient's provider(s).    A summary of these recommendations was sent via [employer].    Nay Francois, Pharm.D, [facility]CP  Medication Therapy Management Pharmacist    Telemedicine Visit Details  Type of service:  Telephone visit  Start Time: 12:50 PM  End Time: 1:11 PM  Originating Location (pt. Location): Home      Distant Location (provider location):  On-site  Provider has received verbal consent for a visit from the patient? Yes     Medication Therapy Recommendations  No medication therapy recommendations to display
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
## 2023-01-04 ENCOUNTER — OFFICE VISIT (OUTPATIENT)
Dept: ORTHOPEDICS | Facility: CLINIC | Age: 63
End: 2023-01-04
Payer: COMMERCIAL

## 2023-01-04 DIAGNOSIS — M51.16 LUMBAR DISC HERNIATION WITH RADICULOPATHY: Primary | ICD-10-CM

## 2023-01-04 DIAGNOSIS — M51.369 DDD (DEGENERATIVE DISC DISEASE), LUMBAR: ICD-10-CM

## 2023-01-04 PROCEDURE — 99214 OFFICE O/P EST MOD 30 MIN: CPT | Performed by: PREVENTIVE MEDICINE

## 2023-01-04 RX ORDER — METHOCARBAMOL 500 MG/1
500 TABLET, FILM COATED ORAL 3 TIMES DAILY PRN
Qty: 60 TABLET | Refills: 0 | Status: SHIPPED | OUTPATIENT
Start: 2023-01-04 | End: 2023-06-13

## 2023-01-04 RX ORDER — PREDNISONE 20 MG/1
40 TABLET ORAL DAILY
Qty: 14 TABLET | Refills: 0 | Status: SHIPPED | OUTPATIENT
Start: 2023-01-04 | End: 2023-06-02
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
## 2023-01-05 ENCOUNTER — VIRTUAL VISIT (OUTPATIENT)
Dept: BEHAVIORAL HEALTH | Facility: CLINIC | Age: 63
End: 2023-01-05
Payer: COMMERCIAL

## 2023-01-05 ENCOUNTER — VIRTUAL VISIT (OUTPATIENT)
Dept: PSYCHIATRY | Facility: CLINIC | Age: 63
End: 2023-01-05
Attending: PSYCHIATRY & NEUROLOGY
Payer: COMMERCIAL

## 2023-01-05 ENCOUNTER — THERAPY VISIT (OUTPATIENT)
Dept: PHYSICAL THERAPY | Facility: CLINIC | Age: 63
End: 2023-01-05
Payer: COMMERCIAL

## 2023-01-05 DIAGNOSIS — M51.16 LUMBAR DISC HERNIATION WITH RADICULOPATHY: ICD-10-CM

## 2023-01-05 DIAGNOSIS — F10.11 ALCOHOL ABUSE, IN REMISSION: ICD-10-CM

## 2023-01-05 DIAGNOSIS — M51.369 DDD (DEGENERATIVE DISC DISEASE), LUMBAR: ICD-10-CM

## 2023-01-05 DIAGNOSIS — Z86.59 HX OF MAJOR DEPRESSION: ICD-10-CM

## 2023-01-05 DIAGNOSIS — F41.1 GENERALIZED ANXIETY DISORDER: ICD-10-CM

## 2023-01-05 DIAGNOSIS — F10.21 HISTORY OF ALCOHOL DEPENDENCE (H): ICD-10-CM

## 2023-01-05 DIAGNOSIS — F41.1 GENERALIZED ANXIETY DISORDER: Primary | ICD-10-CM

## 2023-01-05 DIAGNOSIS — F98.8 ATTENTION DEFICIT DISORDER (ADD) WITHOUT HYPERACTIVITY: Primary | ICD-10-CM

## 2023-01-05 DIAGNOSIS — Z86.59 HISTORY OF MAJOR DEPRESSION: ICD-10-CM

## 2023-01-05 DIAGNOSIS — F98.8 ATTENTION DEFICIT DISORDER (ADD) WITHOUT HYPERACTIVITY: ICD-10-CM

## 2023-01-05 PROCEDURE — 99214 OFFICE O/P EST MOD 30 MIN: CPT | Mod: 95 | Performed by: PSYCHIATRY & NEUROLOGY

## 2023-01-05 PROCEDURE — 90832 PSYTX W PT 30 MINUTES: CPT | Mod: 95 | Performed by: SOCIAL WORKER

## 2023-01-05 PROCEDURE — 97110 THERAPEUTIC EXERCISES: CPT | Mod: GP | Performed by: PHYSICAL THERAPIST

## 2023-01-05 PROCEDURE — 97161 PT EVAL LOW COMPLEX 20 MIN: CPT | Mod: GP | Performed by: PHYSICAL THERAPIST

## 2023-01-05 RX ORDER — DEXTROAMPHETAMINE SACCHARATE, AMPHETAMINE ASPARTATE, DEXTROAMPHETAMINE SULFATE AND AMPHETAMINE SULFATE 2.5; 2.5; 2.5; 2.5 MG/1; MG/1; MG/1; MG/1
10 TABLET ORAL DAILY PRN
Qty: 30 TABLET | Refills: 0 | Status: SHIPPED | OUTPATIENT
Start: 2023-03-20 | End: 2023-06-08

## 2023-01-05 RX ORDER — DEXTROAMPHETAMINE SACCHARATE, AMPHETAMINE ASPARTATE, DEXTROAMPHETAMINE SULFATE AND AMPHETAMINE SULFATE 2.5; 2.5; 2.5; 2.5 MG/1; MG/1; MG/1; MG/1
10 TABLET ORAL DAILY PRN
Qty: 30 TABLET | Refills: 0 | Status: SHIPPED | OUTPATIENT
Start: 2023-01-19 | End: 2023-05-05

## 2023-01-05 RX ORDER — DEXTROAMPHETAMINE SACCHARATE, AMPHETAMINE ASPARTATE, DEXTROAMPHETAMINE SULFATE AND AMPHETAMINE SULFATE 2.5; 2.5; 2.5; 2.5 MG/1; MG/1; MG/1; MG/1
10 TABLET ORAL DAILY PRN
Qty: 30 TABLET | Refills: 0 | Status: SHIPPED | OUTPATIENT
Start: 2023-02-18 | End: 2023-06-02

## 2023-01-05 NOTE — PROGRESS NOTES
[facility] Initial Evaluation -- Lumbar    Date: January 5, 2023  Zari Francisco is a 62 year old female with a lumbar condition.   Referral: Dr Sweeney  Work mechanical stresses:   - sign language  Employment status:  Part time   Leisure mechanical stresses: horse back riding, skiing - downhill,  Has not worked out with her  in 2 weeks, usually 2-3 times a week. Was walking 2-3 miles before the flare    Functional disability score (SCOTT/STarT Back):  30%/STarT Back - high (8)  VAS score (0-10): 4-5/10 now, at worst 9/10  Patient goals/expectations:  Alleviate back and LE symptoms so can sit with ease, return to skiing, every day activity without symptoms    HISTORY:    Present symptoms: lower back pain with symptoms into B glutes/hips and down the posterior thighs.  She developed numbness/tingling into the L foot about the middle of December and will still feel those symptoms intermittently.  Pain quality (sharp/shooting/stabbing/aching/burning/cramping):  Aching and when severe was stabbing.   Paresthesia (yes/no):  Intermittent for the L foot    Present since (onset date): Summer 2022 (started to feel symptoms in the R hip).     Symptoms (improving/unchanging/worsening):  Better since the flare (no improvement with the injection).     Symptoms commenced as a result of: unsure of cause of flare   Condition occurred in the following environment:   home     Symptoms at onset (back/thigh/leg): back, hips and posterior thighs, L foot numbness  Constant symptoms (back/thigh/leg): back  Intermittent symptoms (back/thigh/leg): B hips, posterior thighs (usually with sitting) and L foot    Symptoms are made worse with the following: Always Bending, Always Sitting, Sometimes Rising, Time of day - No effect, Sometimes When still    Symptoms are made better with the following: Always On the move (changing positions) and Other - Ibuprofen, ice, laying on her sides or back    Disturbed sleep  (yes/no):  Yes (wakes 0-2/night)  Sleeping postures (prone/sup/side R/L): R side > L Year of first episode: fell Feb 2021 landing on her R hip and elbow    Previous history: has had back issues off and on after moving hay but would get better with rest/ibuprofen.  No issues with LE symptoms until the fall in Feb 2021.  Previous treatments: YENNIFER January 2022 - helpful; most recent YENN[facility]ER Dec 2022 without relief      Specific Questions:  Cough/Sneeze/Strain (pos/neg): positive when was in her flare  Bowel/Bladder (normal/abnormal): normal   Gait (normal/abnormal): normal  Medications (nil/NSAIDS/analg/steroids/anticoag/other):  NSAIDS, Muscle relaxants and Other - medication for RLS  Medical allergies:  Metal staples, contrast dye, Claritin D, sudafed  General health (excellent/good/fair/poor):  excellent  Pertinent medical history:  Constant symptoms for the back, numbness/tingling,  Imaging (None/Xray/MRI/Other):  X-rays; MRI scheduled for 1/11/2023  Recent or major surgery (yes/no):  Previous L shoulder surgery  Night pain (yes/no): no  Accidents (yes/no): orginally injured back with a fall - see above  Unexplained weight loss (yes/no): no  Barriers at home: none  Other red flags: none    EXAMINATION    Posture:   Sitting (good/fair/poor): fair  Standing (good/fair/poor):fair  Lordosis (red/acc/normal): normal  Correction of posture (better/worse/no effect - did not tolerate the pressure    Lateral Shift (right/left/nil): nil  Relevant (yes/no):  no  Other Observations:     Neurological:    Motor deficit:  5/5 throughout B LEs  Reflexes:  Symmetrical LE reflexes - difficult to elicit  Sensory deficit:  Symmetrical to light touch  Dural signs:  (-) seated SLR    Movement Loss:   Wander Mod Min Nil Pain   Flexion   x  Low back pain   Extension   x x Feels good to do   Side Gliding R   x x No effect   Side Gliding L   x  stiff     Test Movements:   During: produces, abolishes, increases, decreases, no effect, centralizing,  peripheralizing   After: better, worse, no better, no worse, no effect, centralized, peripheralized    Pretest symptoms standing: ache low back 4-5/10   Symptoms During Symptoms After ROM increased ROM decreased No Effect   FIS        Rep FIS        EIS No Effect    No Effect         Rep EIS No Effect    No Effect     FIS      Pretest symptoms lyin-5/10    Symptoms During Symptoms After ROM increased ROM decreased No Effect   AARON No Effect    No Effect         Rep AARON No Effect    Prod R lateral thigh sym    Inc FIS but      EIL Increases    No Worse         Rep EIL Increases    Better    Inc EIS but dec quality of flexion       If required, pretest symptoms: not tested   Symptoms During Symptoms After ROM increased ROM decreased No Effect   SGIS - R        Rep SGIS - R        SGIS - L        Rep SGIS - L          Static Tests:  Sitting slouched:  Not assessed   Sitting erect:  Not assessed  Standing slouched: not assessed  Standing erect:  Not assessed  Lying prone in extension:  Felt good for the lower back - lost FIS ROM after Long sitting:  Not assessed    Other Tests: cat cow - felt good to do and B after; inc FIS and EIS felt good    Provisional Classification:  Inconclusive/Other - Mechanically Unresponsive Radiculopathy    Principle of Management:  Education:  Discussed assessing mid range range of motion exercise (flexion/extension in quadruped).  We discussed goal to alleviate the foot symptoms and centralize to the lower back (centralization/peripheralization).  If notes loss of motion and/or increase in foot symptoms then discontinue that activity.       Equipment provided:  none  Mechanical therapy (Y/N):  yes   Extension principle:    Lateral Principle:    Flexion principle:    Other:  Mid range flexion/extension (cat/cow) 10 reps, goal 6-8 times a day.      ASSESSMENT/PLAN:    Patient is a 62 year old female with lumbar complaints.    Patient has the following significant findings with  corresponding treatment plan.                Diagnosis 1:  Lumbar radiculopathy, DDD    Pain -  manual therapy, self management, education, directional preference exercise and home program  Decreased ROM/flexibility - manual therapy, therapeutic exercise and home program  Decreased joint mobility - manual therapy, therapeutic exercise and home program  Impaired muscle performance - neuro re-education and home program  Decreased function - therapeutic activities and home program    Therapy Evaluation Codes:   1) History comprised of:   Personal factors that impact the plan of care:      None.    Comorbidity factors that impact the plan of care are:      Numbness/tingling and pain at rest, anxiety, restless leg syndrome.     Medications impacting care: Anti-depressant, Anti-inflammatory and Muscle relaxant.  2) Examination of Body Systems comprised of:   Body structures and functions that impact the plan of care:      Lumbar spine.   Activity limitations that impact the plan of care are:      Bending and Sitting.  3) Clinical presentation characteristics are:   Stable/Uncomplicated.  4) Decision-Making    Low complexity using standardized patient assessment instrument and/or measureable assessment of functional outcome.  Cumulative Therapy Evaluation is: Low complexity.    Previous and current functional limitations:  (See Goal Flow Sheet for this information)    Short term and Long term goals: (See Goal Flow Sheet for this information)     Communication ability:  Patient appears to be able to clearly communicate and understand verbal and written communication and follow directions correctly.  Treatment Explanation - The following has been discussed with the patient:   RX ordered/plan of care  Anticipated outcomes  Possible risks and side effects  This patient would benefit from PT intervention to resume normal activities.   Rehab potential is good.    Frequency:  1 X week, once daily  Duration:  For 8  weeks  Discharge Plan:  Achieve all LTG.  Independent in home treatment program.  Reach maximal therapeutic benefit.    Please refer to the daily flowsheet for treatment today, total treatment time and time spent performing 1:1 timed codes.
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
## 2023-01-05 NOTE — PATIENT INSTRUCTIONS
Treatment Plan:  Continue Adderall 10 mg daily as needed for ADHD.  Discontinue clonidine take 0.1 mg for 1-2 weeks and then discontinue the medication:   Continue fluoxetine/Prozac 20 mg daily for mood and anxiety.  Referral previously placed for psychological testing focused on MMPI administration/personality testing.  Continue all other cares per primary care provider.   Continue all other medications as reviewed per electronic medical record today.   Safety plan reviewed. To the Emergency Department as needed or call after hours crisis line at [phone] or [phone]. [place] Crisis Text Line. Text [place] to 057999 or Suicide LifeLine Chat: suicidepreventionlifeline.org/chat  Continue individual psychotherapy/DBT for additional support and ongoing development of nonpharmacologic coping skills and strategies.  Schedule an appointment with me in about 6 months or sooner as needed. Call [facility] at [phone] to schedule.  Follow up with primary care provider as planned or for acute medical concerns.  Call the psychiatric nurse line with medication questions or concerns at [phone].  [employer] may be used to communicate with your provider, but this is not intended to be used for emergencies.    Have previously discussed risks of stimulant medication including, but not limited to, decreased appetite, risk of tics (and that they may be lasting), trouble sleeping, cardiac risks such as increased heart rate and blood pressure, and rare risk of sudden cardiac death.  Also risk of addiction/tolerance/dependence.

## 2023-01-17 ENCOUNTER — HOSPITAL ENCOUNTER (OUTPATIENT)
Dept: MRI IMAGING | Facility: CLINIC | Age: 63
Discharge: HOME | End: 2023-01-17
Attending: PREVENTIVE MEDICINE | Admitting: PREVENTIVE MEDICINE
Payer: COMMERCIAL

## 2023-01-17 DIAGNOSIS — M51.369 DDD (DEGENERATIVE DISC DISEASE), LUMBAR: ICD-10-CM

## 2023-01-17 DIAGNOSIS — M51.16 LUMBAR DISC HERNIATION WITH RADICULOPATHY: ICD-10-CM

## 2023-01-17 PROCEDURE — 72148 MRI LUMBAR SPINE W/O DYE: CPT

## 2023-01-23 ENCOUNTER — VIRTUAL VISIT (OUTPATIENT)
Dept: ORTHOPEDICS | Facility: CLINIC | Age: 63
End: 2023-01-23
Payer: COMMERCIAL

## 2023-01-23 DIAGNOSIS — M51.16 LUMBAR DISC HERNIATION WITH RADICULOPATHY: Primary | ICD-10-CM

## 2023-01-23 DIAGNOSIS — Z91.041 CONTRAST MEDIA ALLERGY: ICD-10-CM

## 2023-01-23 PROCEDURE — 99214 OFFICE O/P EST MOD 30 MIN: CPT | Mod: 95 | Performed by: PREVENTIVE MEDICINE

## 2023-01-23 RX ORDER — METHYLPREDNISOLONE 16 MG/1
TABLET ORAL
Qty: 4 TABLET | Refills: 0 | Status: SHIPPED | OUTPATIENT
Start: 2023-01-23 | End: 2023-06-02

## 2023-01-23 NOTE — LETTER
1/23/2023         RE: Zari Francisco  [address] [place] [address]        Dear Colleague,    Thank you for referring your patient, Zari Francisco, to the [facility] [place]. Please see a copy of my visit note below.    Patient is a  62   year old who is being evaluated via a billable telephone visit.      What phone number would you like to be contacted at? CELL  How would you like to obtain your AVS? MYCHART        Subjective   Patient is a  62   year old who presents by phone call visit for the following:   Lumbar radicular pain lumbar ddd  worse    HPI       Review of Systems   Constitutional, HEENT, cardiovascular, pulmonary, gi and gu systems are negative, except as otherwise noted.      Objective           Vitals:  No vitals were obtained today due to virtual visit.    Physical Exam   healthy, alert and no distress  PSYCH: Alert and oriented times 3; coherent speech, normal   rate and volume, able to articulate logical thoughts, able   to abstract reason, no tangential thoughts, no hallucinations   or delusions  His affect is normal  RESP: No cough, no audible wheezing, able to talk in full sentences  Remainder of exam unable to be completed due to telephone visits    Assessment/Plan  63 yo female with lumbar ddd, disc herniations, worse    I independently reviewed the following imaging studies and discussed with patient:  Lumbar MRI: shows ddd, disc herniations  Discussed and ordered YENNIFER    rx given for medrol        Phone call duration: 20 minutes  Phone call start: 800am  Phone call end: 820am  Dr Sweeney      Again, thank you for allowing me to participate in the care of your patient.        Sincerely,        Jacky Sweeney MD

## 2023-01-23 NOTE — LETTER
1/23/2023         RE: Zari Francisco  [address] [place] [address]        Dear Colleague,    Thank you for referring your patient, Zari Francisco, to the [facility] [place]. Please see a copy of my visit note below.    Patient is a  62   year old who is being evaluated via a billable telephone visit.      What phone number would you like to be contacted at? CELL  How would you like to obtain your AVS? MYCHART        Subjective   Patient is a  62   year old who presents by phone call visit for the following:   Lumbar radicular pain lumbar ddd  worse    HPI       Review of Systems   Constitutional, HEENT, cardiovascular, pulmonary, gi and gu systems are negative, except as otherwise noted.      Objective           Vitals:  No vitals were obtained today due to virtual visit.    Physical Exam   healthy, alert and no distress  PSYCH: Alert and oriented times 3; coherent speech, normal   rate and volume, able to articulate logical thoughts, able   to abstract reason, no tangential thoughts, no hallucinations   or delusions  His affect is normal  RESP: No cough, no audible wheezing, able to talk in full sentences  Remainder of exam unable to be completed due to telephone visits    Assessment/Plan  61 yo female with lumbar ddd, disc herniations, worse    I independently reviewed the following imaging studies and discussed with patient:  Lumbar MRI: shows ddd, disc herniations  Discussed and ordered YENNIFER    rx given for medrol        Phone call duration: 20 minutes  Phone call start: 800am  Phone call end: 820am  Dr Sweeney      Again, thank you for allowing me to participate in the care of your patient.        Sincerely,        Jacky Sweeney MD

## 2023-01-23 NOTE — PROGRESS NOTES
Patient is a  62   year old who is being evaluated via a billable telephone visit.      What phone number would you like to be contacted at? CELL  How would you like to obtain your AVS? KRISTI        Subjective   Patient is a  62   year old who presents by phone call visit for the following:   Lumbar radicular pain lumbar ddd  worse    HPI       Review of Systems   Constitutional, HEENT, cardiovascular, pulmonary, gi and gu systems are negative, except as otherwise noted.      Objective           Vitals:  No vitals were obtained today due to virtual visit.    Physical Exam   healthy, alert and no distress  PSYCH: Alert and oriented times 3; coherent speech, normal   rate and volume, able to articulate logical thoughts, able   to abstract reason, no tangential thoughts, no hallucinations   or delusions  His affect is normal  RESP: No cough, no audible wheezing, able to talk in full sentences  Remainder of exam unable to be completed due to telephone visits    Assessment/Plan  61 yo female with lumbar ddd, disc herniations, worse    I independently reviewed the following imaging studies and discussed with patient:  Lumbar MRI: shows ddd, disc herniations  Discussed and ordered YENNIFER    rx given for medrol        Phone call duration: 20 minutes  Phone call start: 800am  Phone call end: 820am  Dr Sweeney

## 2023-01-28 DIAGNOSIS — G25.81 RESTLESS LEG SYNDROME: ICD-10-CM

## 2023-01-30 RX ORDER — ROPINIROLE 1 MG/1
TABLET, FILM COATED ORAL
Qty: 135 TABLET | Refills: 0 | Status: SHIPPED | OUTPATIENT
Start: 2023-01-30 | End: 2023-07-24

## 2023-01-30 RX ORDER — FERROUS GLUCONATE 324(38)MG
324 TABLET ORAL
Qty: 90 TABLET | Refills: 3 | Status: SHIPPED | OUTPATIENT
Start: 2023-01-30 | End: 2024-05-09

## 2023-01-30 NOTE — TELEPHONE ENCOUNTER
Patient calling as she needs a refill of her Iron, and her insurance company is asking for her to get #90 at a time instead of monthly prescriptions.  [facility] set up refill and changed it to #90 with 3 refills if ok with provider. Pharmacy verified.  Needs today as she is going out of town.  She had called a month ago for this, but there was some confusion.  Last visit: 9/13/2022.   Edie Vogel CMA

## 2023-01-30 NOTE — TELEPHONE ENCOUNTER
Provider signed rx.  Called patient and notified her that this has been completed.  Edie Vogel, CMA

## 2023-02-16 ENCOUNTER — LAB (OUTPATIENT)
Dept: LAB | Facility: CLINIC | Age: 63
End: 2023-02-16
Payer: COMMERCIAL

## 2023-02-16 DIAGNOSIS — Z13.1 SCREENING FOR DIABETES MELLITUS (DM): ICD-10-CM

## 2023-02-16 DIAGNOSIS — Z11.1 SCREENING EXAMINATION FOR PULMONARY TUBERCULOSIS: ICD-10-CM

## 2023-02-16 DIAGNOSIS — Z13.220 SCREENING FOR HYPERLIPIDEMIA: ICD-10-CM

## 2023-02-16 DIAGNOSIS — Z13.0 SCREENING FOR DEFICIENCY ANEMIA: ICD-10-CM

## 2023-02-16 DIAGNOSIS — G25.81 RESTLESS LEG SYNDROME: ICD-10-CM

## 2023-02-16 LAB
ALBUMIN SERPL-MCNC: 3.7 G/DL (ref 3.4–5)
ALP SERPL-CCNC: 87 U/L (ref 40–150)
ALT SERPL W P-5'-P-CCNC: 26 U/L (ref 0–50)
ANION GAP SERPL CALCULATED.3IONS-SCNC: 6 MMOL/L (ref 3–14)
AST SERPL W P-5'-P-CCNC: 13 U/L (ref 0–45)
BILIRUB SERPL-MCNC: 0.3 MG/DL (ref 0.2–1.3)
BUN SERPL-MCNC: 18 MG/DL (ref 7–30)
CALCIUM SERPL-MCNC: 9.2 MG/DL (ref 8.5–10.1)
CHLORIDE BLD-SCNC: 105 MMOL/L (ref 94–109)
CHOLEST SERPL-MCNC: 204 MG/DL
CO2 SERPL-SCNC: 29 MMOL/L (ref 20–32)
CREAT SERPL-MCNC: 0.69 MG/DL (ref 0.52–1.04)
ERYTHROCYTE [DISTWIDTH] IN BLOOD BY AUTOMATED COUNT: 11.6 % (ref 10–15)
FASTING STATUS PATIENT QL REPORTED: NO
FERRITIN SERPL-MCNC: 46 NG/ML (ref 8–252)
GFR SERPL CREATININE-BSD FRML MDRD: >90 ML/MIN/1.73M2
GLUCOSE BLD-MCNC: 95 MG/DL (ref 70–99)
HCT VFR BLD AUTO: 43.7 % (ref 35–47)
HDLC SERPL-MCNC: 77 MG/DL
HGB BLD-MCNC: 14.3 G/DL (ref 11.7–15.7)
LDLC SERPL CALC-MCNC: 89 MG/DL
MCH RBC QN AUTO: 29.7 PG (ref 26.5–33)
MCHC RBC AUTO-ENTMCNC: 32.7 G/DL (ref 31.5–36.5)
MCV RBC AUTO: 91 FL (ref 78–100)
NONHDLC SERPL-MCNC: 127 MG/DL
PLATELET # BLD AUTO: 291 10E3/UL (ref 150–450)
POTASSIUM BLD-SCNC: 4.2 MMOL/L (ref 3.4–5.3)
PROT SERPL-MCNC: 6.8 G/DL (ref 6.8–8.8)
RBC # BLD AUTO: 4.81 10E6/UL (ref 3.8–5.2)
SODIUM SERPL-SCNC: 140 MMOL/L (ref 133–144)
TRIGL SERPL-MCNC: 192 MG/DL
WBC # BLD AUTO: 11.4 10E3/UL (ref 4–11)

## 2023-02-16 PROCEDURE — 36415 COLL VENOUS BLD VENIPUNCTURE: CPT

## 2023-02-16 PROCEDURE — 86481 TB AG RESPONSE T-CELL SUSP: CPT

## 2023-02-16 PROCEDURE — 85027 COMPLETE CBC AUTOMATED: CPT

## 2023-02-16 PROCEDURE — 80053 COMPREHEN METABOLIC PANEL: CPT

## 2023-02-16 PROCEDURE — 82728 ASSAY OF FERRITIN: CPT

## 2023-02-16 PROCEDURE — 80061 LIPID PANEL: CPT

## 2023-02-17 NOTE — RESULT ENCOUNTER NOTE
Dg Hameed,  Please call to schedule your physical   It looks like you missed your appointment today.  We will review your lab results at the time of physical  Susanna Dyer MD

## 2023-02-18 LAB
QUANTIFERON MITOGEN: 6.08 IU/ML
QUANTIFERON NIL TUBE: 0.05 IU/ML
QUANTIFERON TB1 TUBE: 0.09 IU/ML
QUANTIFERON TB2 TUBE: 0.08

## 2023-02-19 LAB
GAMMA INTERFERON BACKGROUND BLD IA-ACNC: 0.05 IU/ML
M TB IFN-G BLD-IMP: NEGATIVE
M TB IFN-G CD4+ BCKGRND COR BLD-ACNC: 6.03 IU/ML
MITOGEN IGNF BCKGRD COR BLD-ACNC: 0.03 IU/ML
MITOGEN IGNF BCKGRD COR BLD-ACNC: 0.04 IU/ML

## 2023-03-17 ENCOUNTER — TRANSFERRED RECORDS (OUTPATIENT)
Dept: HEALTH INFORMATION MANAGEMENT | Facility: CLINIC | Age: 63
End: 2023-03-17
Payer: COMMERCIAL

## 2023-03-19 PROBLEM — M51.369 DDD (DEGENERATIVE DISC DISEASE), LUMBAR: Status: RESOLVED | Noted: 2023-01-05 | Resolved: 2023-03-19

## 2023-03-19 PROBLEM — M51.16 LUMBAR DISC HERNIATION WITH RADICULOPATHY: Status: RESOLVED | Noted: 2023-01-05 | Resolved: 2023-03-19

## 2023-05-05 ENCOUNTER — MYC MEDICAL ADVICE (OUTPATIENT)
Dept: PSYCHIATRY | Facility: CLINIC | Age: 63
End: 2023-05-05
Payer: COMMERCIAL

## 2023-05-05 DIAGNOSIS — F98.8 ATTENTION DEFICIT DISORDER (ADD) WITHOUT HYPERACTIVITY: ICD-10-CM

## 2023-05-05 RX ORDER — DEXTROAMPHETAMINE SACCHARATE, AMPHETAMINE ASPARTATE, DEXTROAMPHETAMINE SULFATE AND AMPHETAMINE SULFATE 2.5; 2.5; 2.5; 2.5 MG/1; MG/1; MG/1; MG/1
10 TABLET ORAL DAILY PRN
Qty: 30 TABLET | Refills: 0 | Status: SHIPPED | OUTPATIENT
Start: 2023-05-05 | End: 2023-06-08

## 2023-05-05 NOTE — TELEPHONE ENCOUNTER
Date of Last Office Visit: 1/5/23  Date of Next Office Visit: 6/8/23  No shows since last visit: 0  Cancellations since last visit: 0    Medication requested: amphetamine-dextroamphetamine (ADDERALL) 10 MG tablet Date last ordered: 3/20/23 Qty: 30 Refills: 0     Review of [place] ?: No Unable to access    Lapse in medication adherence greater than 5 days?: No  If yes, call patient and gather details: na  Medication refill request verified as identical to current order?: yes  Result of Last DAM, VPA, Li+ Level, CBC, or Carbamazepine Level (at or since last visit): N/A      Last visit treatment plan:     Continue Adderall 10 mg daily as needed for ADHD.    Discontinue clonidine take 0.1 mg for 1-2 weeks and then discontinue the medication:     Continue fluoxetine/Prozac 20 mg daily for mood and anxiety.  Referral previously placed for psychological testing focused on MMPI administration/personality testing.    []Medication refilled per  Medication Refill in Ambulatory Care  policy.  [x]Medication unable to be refilled by RN due to criteria not met as indicated below:    []Eligibility - not seen in the last year   []Supervision - no future appointment   []Compliance - no shows, cancellations or lapse in therapy   []Verification - order discrepancy   [x]Controlled medication   []Medication not included in policy   []90-day supply request   []Other

## 2023-05-12 ENCOUNTER — TRANSFERRED RECORDS (OUTPATIENT)
Dept: HEALTH INFORMATION MANAGEMENT | Facility: CLINIC | Age: 63
End: 2023-05-12
Payer: COMMERCIAL

## 2023-06-01 ASSESSMENT — ENCOUNTER SYMPTOMS
SHORTNESS OF BREATH: 0
EYE PAIN: 0
NERVOUS/ANXIOUS: 0
JOINT SWELLING: 0
SORE THROAT: 0
BREAST MASS: 0
MYALGIAS: 0
CONSTIPATION: 1
DYSURIA: 0
ABDOMINAL PAIN: 0
DIZZINESS: 0
HEARTBURN: 0
HEADACHES: 0
PARESTHESIAS: 0
HEMATOCHEZIA: 0
DIARRHEA: 0
PALPITATIONS: 0
NAUSEA: 0
WEAKNESS: 0
COUGH: 0
HEMATURIA: 0
CHILLS: 0
FREQUENCY: 0
ARTHRALGIAS: 0
FEVER: 0

## 2023-06-02 ENCOUNTER — OFFICE VISIT (OUTPATIENT)
Dept: FAMILY MEDICINE | Facility: CLINIC | Age: 63
End: 2023-06-02
Payer: COMMERCIAL

## 2023-06-02 VITALS
TEMPERATURE: 97.8 F | HEART RATE: 66 BPM | BODY MASS INDEX: 24.56 KG/M2 | WEIGHT: 147.4 LBS | HEIGHT: 65 IN | RESPIRATION RATE: 15 BRPM | DIASTOLIC BLOOD PRESSURE: 72 MMHG | OXYGEN SATURATION: 98 % | SYSTOLIC BLOOD PRESSURE: 123 MMHG

## 2023-06-02 DIAGNOSIS — H02.729 HYPOTRICHOSIS OF EYELID, UNSPECIFIED LATERALITY: ICD-10-CM

## 2023-06-02 DIAGNOSIS — Z00.00 ROUTINE GENERAL MEDICAL EXAMINATION AT A HEALTH CARE FACILITY: Primary | ICD-10-CM

## 2023-06-02 DIAGNOSIS — F39 MOOD DISORDER (H): ICD-10-CM

## 2023-06-02 DIAGNOSIS — G25.81 RESTLESS LEG SYNDROME: ICD-10-CM

## 2023-06-02 DIAGNOSIS — G56.02 CARPAL TUNNEL SYNDROME OF LEFT WRIST: ICD-10-CM

## 2023-06-02 DIAGNOSIS — Z12.31 VISIT FOR SCREENING MAMMOGRAM: ICD-10-CM

## 2023-06-02 DIAGNOSIS — Z12.11 SCREEN FOR COLON CANCER: ICD-10-CM

## 2023-06-02 DIAGNOSIS — F98.8 ATTENTION DEFICIT DISORDER (ADD) WITHOUT HYPERACTIVITY: ICD-10-CM

## 2023-06-02 DIAGNOSIS — N95.2 ATROPHIC VAGINITIS: ICD-10-CM

## 2023-06-02 DIAGNOSIS — Z12.4 CERVICAL CANCER SCREENING: ICD-10-CM

## 2023-06-02 DIAGNOSIS — R91.8 PULMONARY NODULES: ICD-10-CM

## 2023-06-02 PROCEDURE — 90715 TDAP VACCINE 7 YRS/> IM: CPT | Performed by: FAMILY MEDICINE

## 2023-06-02 PROCEDURE — 99396 PREV VISIT EST AGE 40-64: CPT | Mod: 25 | Performed by: FAMILY MEDICINE

## 2023-06-02 PROCEDURE — 90677 PCV20 VACCINE IM: CPT | Performed by: FAMILY MEDICINE

## 2023-06-02 PROCEDURE — G0145 SCR C/V CYTO,THINLAYER,RESCR: HCPCS | Performed by: FAMILY MEDICINE

## 2023-06-02 PROCEDURE — 90471 IMMUNIZATION ADMIN: CPT | Performed by: FAMILY MEDICINE

## 2023-06-02 PROCEDURE — 90472 IMMUNIZATION ADMIN EACH ADD: CPT | Performed by: FAMILY MEDICINE

## 2023-06-02 PROCEDURE — 87624 HPV HI-RISK TYP POOLED RSLT: CPT | Performed by: FAMILY MEDICINE

## 2023-06-02 ASSESSMENT — ENCOUNTER SYMPTOMS
HEARTBURN: 0
ABDOMINAL PAIN: 0
NERVOUS/ANXIOUS: 0
DIZZINESS: 0
PALPITATIONS: 0
FREQUENCY: 0
HEADACHES: 0
SORE THROAT: 0
COUGH: 0
JOINT SWELLING: 0
PARESTHESIAS: 0
HEMATOCHEZIA: 0
ARTHRALGIAS: 0
SHORTNESS OF BREATH: 0
CHILLS: 0
BREAST MASS: 0
FEVER: 0
NAUSEA: 0
HEMATURIA: 0
DYSURIA: 0
MYALGIAS: 0
WEAKNESS: 0
EYE PAIN: 0
DIARRHEA: 0
CONSTIPATION: 1

## 2023-06-02 ASSESSMENT — PAIN SCALES - GENERAL: PAINLEVEL: MODERATE PAIN (5)

## 2023-06-02 NOTE — NURSING NOTE
Prior to immunization administration, verified patients identity using patient s name and date of birth. Please see Immunization Activity for additional information.     Screening Questionnaire for Adult Immunization    Are you sick today?   No   Do you have allergies to medications, food, a vaccine component or latex?   No   Have you ever had a serious reaction after receiving a vaccination?   No   Do you have a long-term health problem with heart, lung, kidney, or metabolic disease (e.g., diabetes), asthma, a blood disorder, no spleen, complement component deficiency, a cochlear implant, or a spinal fluid leak?  Are you on long-term aspirin therapy?   No   Do you have cancer, leukemia, HIV/AIDS, or any other immune system problem?   No   Do you have a parent, brother, or sister with an immune system problem?   No   In the past 3 months, have you taken medications that affect  your immune system, such as prednisone, other steroids, or anticancer drugs; drugs for the treatment of rheumatoid arthritis, Crohn s disease, or psoriasis; or have you had radiation treatments?   No   Have you had a seizure, or a brain or other nervous system problem?   No   During the past year, have you received a transfusion of blood or blood    products, or been given immune (gamma) globulin or antiviral drug?   No   For women: Are you pregnant or is there a chance you could become       pregnant during the next month?   No   Have you received any vaccinations in the past 4 weeks?   No     Immunization questionnaire answers were all negative.      Injection of TDAP & PCV 20 given by Anel Drummond MA. Patient instructed to remain in clinic for 15 minutes afterwards, and to report any adverse reactions.     Screening performed by Anel Drummond MA on 6/2/2023 at 9:05 AM.
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
## 2023-06-05 ASSESSMENT — ANXIETY QUESTIONNAIRES
8. IF YOU CHECKED OFF ANY PROBLEMS, HOW DIFFICULT HAVE THESE MADE IT FOR YOU TO DO YOUR WORK, TAKE CARE OF THINGS AT HOME, OR GET ALONG WITH OTHER PEOPLE?: SOMEWHAT DIFFICULT
6. BECOMING EASILY ANNOYED OR IRRITABLE: NEARLY EVERY DAY
GAD7 TOTAL SCORE: 9
3. WORRYING TOO MUCH ABOUT DIFFERENT THINGS: SEVERAL DAYS
5. BEING SO RESTLESS THAT IT IS HARD TO SIT STILL: SEVERAL DAYS
3. WORRYING TOO MUCH ABOUT DIFFERENT THINGS: SEVERAL DAYS
1. FEELING NERVOUS, ANXIOUS, OR ON EDGE: MORE THAN HALF THE DAYS
8. IF YOU CHECKED OFF ANY PROBLEMS, HOW DIFFICULT HAVE THESE MADE IT FOR YOU TO DO YOUR WORK, TAKE CARE OF THINGS AT HOME, OR GET ALONG WITH OTHER PEOPLE?: SOMEWHAT DIFFICULT
4. TROUBLE RELAXING: SEVERAL DAYS
GAD7 TOTAL SCORE: 9
1. FEELING NERVOUS, ANXIOUS, OR ON EDGE: MORE THAN HALF THE DAYS
IF YOU CHECKED OFF ANY PROBLEMS ON THIS QUESTIONNAIRE, HOW DIFFICULT HAVE THESE PROBLEMS MADE IT FOR YOU TO DO YOUR WORK, TAKE CARE OF THINGS AT HOME, OR GET ALONG WITH OTHER PEOPLE: SOMEWHAT DIFFICULT
4. TROUBLE RELAXING: SEVERAL DAYS
7. FEELING AFRAID AS IF SOMETHING AWFUL MIGHT HAPPEN: NOT AT ALL
6. BECOMING EASILY ANNOYED OR IRRITABLE: NEARLY EVERY DAY
GAD7 TOTAL SCORE: 9
IF YOU CHECKED OFF ANY PROBLEMS ON THIS QUESTIONNAIRE, HOW DIFFICULT HAVE THESE PROBLEMS MADE IT FOR YOU TO DO YOUR WORK, TAKE CARE OF THINGS AT HOME, OR GET ALONG WITH OTHER PEOPLE: SOMEWHAT DIFFICULT
GAD7 TOTAL SCORE: 9
2. NOT BEING ABLE TO STOP OR CONTROL WORRYING: SEVERAL DAYS
5. BEING SO RESTLESS THAT IT IS HARD TO SIT STILL: SEVERAL DAYS
GAD7 TOTAL SCORE: 9
7. FEELING AFRAID AS IF SOMETHING AWFUL MIGHT HAPPEN: NOT AT ALL
2. NOT BEING ABLE TO STOP OR CONTROL WORRYING: SEVERAL DAYS
GAD7 TOTAL SCORE: 9

## 2023-06-05 NOTE — PROGRESS NOTES
[facility]  June 8, 2023      Behavioral Health Clinician Progress Note    Patient Name: Zari Francisco      Telemedicine Visit: The patient's condition can be safely assessed and treated via synchronous audio and visual telemedicine encounter.      Reason for Telemedicine Visit: Services only offered telehealth    Originating Site (Patient Location): Patient's home    Distant Site (Provider Location): Provider Remote Setting- Home Office    Consent:  The patient/guardian has verbally consented to: the potential risks and benefits of telemedicine (video visit) versus in person care; bill my insurance or make self-payment for services provided; and responsibility for payment of non-covered services.     Mode of Communication:  Video Conference via [employer]    As the provider I attest to compliance with applicable laws and regulations related to telemedicine.         Service Type:  Individual      Service Location:   [facility]     Session Start Time: 7:30 am  Session End Time: 7:52 am      Session Length: 16 - 37      Attendees: Patient    Visit Activities (Refresh list every visit): [facility] Only    Diagnostic Assessment Date: 01/17/2022  Treatment Plan Review Date: 9/8/23  See Flowsheets for today's PHQ-9 and PUSHPA-7 results  Previous PHQ-9:       11/9/2022    10:10 AM 2/17/2023     6:45 AM 6/7/2023    10:59 AM   PHQ-9 SCORE   PHQ-9 Total Score MyChart 11 (Moderate depression) 3 (Minimal depression) 3 (Minimal depression)   PHQ-9 Total Score 11 3 3     Previous PUSHPA-7:       9/29/2022     7:49 AM 11/9/2022    10:12 AM 6/5/2023     3:32 PM   PUSHPA-7 SCORE   Total Score  9 (mild anxiety) 9 (mild anxiety)   Total Score 7 9 9    9       DATA  Extended Session (60+ minutes): No  Interactive Complexity: No  Crisis: No  [facility] Patient: No    Treatment Objective(s) Addressed in This Session:  Target Behavior(s): Anxiety management    Anxiety: will develop more effective coping skills  "to manage anxiety symptoms    Current Stressors / Issues   update: Had been doing really well up to last few weeks. Reporting really high irritability and increase in angry out bursts. Reports Fluoxitine was really  helpful for the irritability but felt jittery and would clench jaw while on med. Stopped about 1 month ago and has seen steady decline and increase of anger.  Has only been on Clonidine this past month. Anxiety reported to be high as well. She becomes highly overwhelmed and feels she cannot complete tasks. Reports grand kids are coming this week and she is really worried about how she will manage.   Stresses: working part time over the summer. Anger interfering with interpersonal relationships.   Appetite: reports weight gain. Craving food.   Sleep: poor elizabeth to restless legs. Has neurology consult scheduled.   Therapy: DBT therapy  MANOLO: na  Preg: na  Most important: Increase irritability and anger. Restless on fluoxitine. Higher anxiety. Recently having more cravings with food.      1/8/23  Pt reports she has been doing really well. She is in her 4th week of DBT. Reports she has found the skills to be helpful. She reports being engaged and enjoys her therapist. Reports improvement with irritability but has had several outbursts of anger. Denies having withdrawal sx from Cymbalta. She would like to discuss stopping Fluoxetine. Anxiety has been higher with some panic attacks. States she feels is addressing anxiety in therapy. Reports good attention and focus.        11/9/22  Reported that she is not doing well saying \"I'm raging at the smallest thing.\" She described a situation at a restaurant in which she yelled at the managers about a fee for using a credit card. She is starting DBT soon ([facility]) and spoke about the \"huge commitment\" of doing group and the individual therapy. She spoke about the struggle she has with groups in general. She noted that because of her profession " "she will be in a more professional group. She spoke about feeling a lot of physical withdrawal symptoms from Cymbalta (less than 2 weeks). She was encouraged to consider her distress as motivation to stay engaged with therapy and accept the discomfort she is likely to endure. She agreed with this approach and that she was willing to try something new.       09/29/2022:  Patient arrived in great spirits and reports her mood overall has been \"not great\" she has noticed being more irritable and on edge with decreasing her dosage of Duloxetine. She has also noticed having low energy which she is not used to- she is unsure if this is because of the medication,  Her age or from having COVID in July. She still is getting out of bed fine and able to function but doesn't enjoy having less energy. Currently she is titrating her Duloxetine 60mg one day and 30mg the next. She is taking . 1mg of Klonadine in the morning and one in the evening-doesn't notice any difference with this. She acknowledges she feels better about being on less medication and is happy she continues to wean off dosages. Sleep has been difficult due to her restless legs, she will be starting iron infusions next week and is hopeful this will help. Reports iron levels are significantly low. Her legs wake her up during the night. Appetite is Good  With no concerns. Pt started a DBT podcast and is finding this very helpful and learning mindfulness. Pt received letter that  will not be a medica provider soon which is why she made this appt earlier than expected.       Progress on Treatment Objective(s) / Homework:  Worsening - PREPARATION (Decided to change - considering how); Intervened by negotiating a change plan and determining options / strategies for behavior change, identifying triggers, exploring social supports, and working towards setting a date to begin behavior change    Motivational Interviewing    MI Intervention: Expressed " Empathy/Understanding, Supported Autonomy, Collaboration, Evocation, Open-ended questions and Reflections: simple and complex     Change Talk Expressed by the Patient: Activation Taking steps    Provider Response to Change Talk: E - Evoked more info from patient about behavior change, A - Affirmed patient's thoughts, decisions, or attempts at behavior change and R - Reflected patient's change talk    Also provided psychoeducation about behavioral health condition, symptoms, and treatment options    Care Plan review completed: Yes    Medication Review:  Changes to psychiatric medications, see updated Medication List in EPIC.     Medication Compliance:  Yes    Changes in Health Issues:   None reported    Chemical Use Review:   Substance Use: Chemical use reviewed, no active concerns identified      Tobacco Use: No current tobacco use.      Assessment: Current Emotional / Mental Status (status of significant symptoms):  Risk status (Self / Other harm or suicidal ideation)  Patient denies a history of suicidal ideation, suicide attempts, self-injurious behavior, homicidal ideation, homicidal behavior and and other safety concerns  Patient denies current fears or concerns for personal safety.  Patient denies current or recent suicidal ideation or behaviors.  Patient denies current or recent homicidal ideation or behaviors.  Patient denies current or recent self injurious behavior or ideation.  Patient denies other safety concerns.  A safety and risk management plan has not been developed at this time, however patient was encouraged to call [place] [phone] should there be a change in any of these risk factors.    Appearance:   Appropriate   Eye Contact:   Good   Psychomotor Behavior: Normal   Attitude:   Cooperative   Orientation:   All  Speech   Rate / Production: Normal    Volume:  Normal   Mood:    Normal  Affect:    Appropriate   Thought Content:  Clear   Thought Form:  Coherent  Tangential   Insight:    Fair      Diagnoses:  1. Generalized anxiety disorder    2. Attention deficit disorder (ADD) without hyperactivity    3. History of alcohol dependence (H)        Collateral Reports Completed:  Communicated with: Dr. Valentine    Plan: (Homework, other):  Patient was given information about behavioral services and instructed to schedule a follow up appointment with the [facility] in conjunction with next [facility] appointment.   .  She was also given information about mental health symptoms and treatment options .  CD Recommendations: No indications of CD issues.  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    Anticipated number of session for this episode of care: 5-6  Anticipation frequency of session: As determined by Dr. Valentine  Anticipated Duration of each session: 16-37 minutes  Treatment plan will be reviewed in 90 days or when  goals have been changed.       MeasurableTreatment Goal(s) related to diagnosis / functional impairment(s)  Goal 1: Patient will work with providers to manage symptoms    I will know I've met my goal when I'm calmer.      Objective #A (Patient Action)  Patient will attend all appointments, take medication as prescribed.  Status: Continued - Date(s): 6/8/23     Intervention(s)  [facility] will Monitor and assist in overcoming barriers to treatment adherence    Objective #B  Patient will consider all recommendations offered.  Status: Continued - Date(s): 6/8/23      Intervention(s)  [facility] will educate patient on treatment options, clarify concerns, work with pt to overcome any resistance to compliance.      Patient has reviewed and agreed to the above plan.      RILEY Colmenares  06/08/2023  Answers for HPI/ROS submitted by the patient on 6/7/2023  If you checked off any problems, how difficult have these problems made it for you to do your work, take care of things at home, or get along with other people?: Somewhat difficult  PHQ9 TOTAL SCORE: 3  PUSHPA 7 TOTAL SCORE: 9

## 2023-06-06 LAB
BKR LAB AP GYN ADEQUACY: NORMAL
BKR LAB AP GYN INTERPRETATION: NORMAL
BKR LAB AP HPV REFLEX: NORMAL
BKR LAB AP PREVIOUS ABNORMAL: NORMAL
PATH REPORT.COMMENTS IMP SPEC: NORMAL
PATH REPORT.COMMENTS IMP SPEC: NORMAL
PATH REPORT.RELEVANT HX SPEC: NORMAL

## 2023-06-07 ASSESSMENT — PATIENT HEALTH QUESTIONNAIRE - PHQ9
SUM OF ALL RESPONSES TO PHQ QUESTIONS 1-9: 3
10. IF YOU CHECKED OFF ANY PROBLEMS, HOW DIFFICULT HAVE THESE PROBLEMS MADE IT FOR YOU TO DO YOUR WORK, TAKE CARE OF THINGS AT HOME, OR GET ALONG WITH OTHER PEOPLE: SOMEWHAT DIFFICULT
SUM OF ALL RESPONSES TO PHQ QUESTIONS 1-9: 3

## 2023-06-08 ENCOUNTER — VIRTUAL VISIT (OUTPATIENT)
Dept: BEHAVIORAL HEALTH | Facility: CLINIC | Age: 63
End: 2023-06-08
Payer: COMMERCIAL

## 2023-06-08 ENCOUNTER — VIRTUAL VISIT (OUTPATIENT)
Dept: PSYCHIATRY | Facility: CLINIC | Age: 63
End: 2023-06-08
Payer: COMMERCIAL

## 2023-06-08 DIAGNOSIS — F98.8 ATTENTION DEFICIT DISORDER (ADD) WITHOUT HYPERACTIVITY: ICD-10-CM

## 2023-06-08 DIAGNOSIS — F41.1 GENERALIZED ANXIETY DISORDER: Primary | ICD-10-CM

## 2023-06-08 DIAGNOSIS — Z86.59 HX OF MAJOR DEPRESSION: ICD-10-CM

## 2023-06-08 DIAGNOSIS — F10.11 ALCOHOL ABUSE, IN REMISSION: ICD-10-CM

## 2023-06-08 DIAGNOSIS — F10.21 HISTORY OF ALCOHOL DEPENDENCE (H): ICD-10-CM

## 2023-06-08 LAB
HUMAN PAPILLOMA VIRUS 16 DNA: NEGATIVE
HUMAN PAPILLOMA VIRUS 18 DNA: NEGATIVE
HUMAN PAPILLOMA VIRUS FINAL DIAGNOSIS: NORMAL
HUMAN PAPILLOMA VIRUS OTHER HR: NEGATIVE

## 2023-06-08 PROCEDURE — 90832 PSYTX W PT 30 MINUTES: CPT | Mod: VID | Performed by: SOCIAL WORKER

## 2023-06-08 PROCEDURE — 99214 OFFICE O/P EST MOD 30 MIN: CPT | Mod: VID | Performed by: PSYCHIATRY & NEUROLOGY

## 2023-06-08 RX ORDER — ESCITALOPRAM OXALATE 10 MG/1
10 TABLET ORAL DAILY
Qty: 90 TABLET | Refills: 0 | Status: SHIPPED | OUTPATIENT
Start: 2023-06-08 | End: 2023-07-13 | Stop reason: DRUGHIGH

## 2023-06-08 RX ORDER — CLONIDINE HYDROCHLORIDE 0.2 MG/1
0.2 TABLET ORAL DAILY
Qty: 90 TABLET | Refills: 1 | Status: SHIPPED | OUTPATIENT
Start: 2023-06-08 | End: 2023-07-13 | Stop reason: DRUGHIGH

## 2023-06-08 RX ORDER — DEXTROAMPHETAMINE SACCHARATE, AMPHETAMINE ASPARTATE, DEXTROAMPHETAMINE SULFATE AND AMPHETAMINE SULFATE 2.5; 2.5; 2.5; 2.5 MG/1; MG/1; MG/1; MG/1
10 TABLET ORAL DAILY PRN
Qty: 30 TABLET | Refills: 0 | Status: SHIPPED | OUTPATIENT
Start: 2023-06-08 | End: 2023-07-08

## 2023-06-08 RX ORDER — DEXTROAMPHETAMINE SACCHARATE, AMPHETAMINE ASPARTATE, DEXTROAMPHETAMINE SULFATE AND AMPHETAMINE SULFATE 2.5; 2.5; 2.5; 2.5 MG/1; MG/1; MG/1; MG/1
10 TABLET ORAL DAILY PRN
Qty: 30 TABLET | Refills: 0 | Status: SHIPPED | OUTPATIENT
Start: 2023-08-09 | End: 2023-09-08

## 2023-06-08 RX ORDER — DEXTROAMPHETAMINE SACCHARATE, AMPHETAMINE ASPARTATE, DEXTROAMPHETAMINE SULFATE AND AMPHETAMINE SULFATE 2.5; 2.5; 2.5; 2.5 MG/1; MG/1; MG/1; MG/1
10 TABLET ORAL DAILY PRN
Qty: 30 TABLET | Refills: 0 | Status: SHIPPED | OUTPATIENT
Start: 2023-07-09 | End: 2023-08-08

## 2023-06-08 NOTE — PATIENT INSTRUCTIONS
Treatment Plan:  Continue Adderall 10 mg daily as needed for ADHD.  Continue clonidine 0.2 mg daily for anxiety, ADHD.  Start Lexapro/escitalopram for anxiety and hx of depression: take 5 mg daily for 3-5 days then increase to 10 mg as tolerated.   Referral previously placed for psychological testing focused on MMPI administration/personality testing.  Continue all other cares per primary care provider.   Continue all other medications as reviewed per electronic medical record today.   Safety plan reviewed. To the Emergency Department as needed or call after hours crisis line at [phone] or [phone]. [place] Crisis Text Line. Text [place] to 738777 or Suicide LifeLine Chat: suicidepreventionSweet Shopline.org/chat  Continue individual psychotherapy/DBT for additional support and ongoing development of nonpharmacologic coping skills and strategies.  Schedule an appointment with me in about 5 weeks or sooner as needed. Call [facility] at [phone] to schedule.  Follow up with primary care provider as planned or for acute medical concerns.  Call the psychiatric nurse line with medication questions or concerns at [phone].  Zettasethart may be used to communicate with your provider, but this is not intended to be used for emergencies.    Have previously discussed risks of stimulant medication including, but not limited to, decreased appetite, risk of tics (and that they may be lasting), trouble sleeping, cardiac risks such as increased heart rate and blood pressure, and rare risk of sudden cardiac death.  Also risk of addiction/tolerance/dependence.    Patient Education   Collaborative Care Psychiatry Service  What to Expect  Here's what to expect from your Collaborative Care Psychiatry Service (CCPS).   About CCPS  CCPS means 2 people work together to help you get better. You'll meet with a behavioral health clinician and a psychiatric doctor. A behavioral health clinician helps people with mental  "health problems by talking with them. A psychiatric doctor helps people by giving them medicine.  How it works  At every visit, you'll see the behavioral health clinician (BHC) first. They'll talk with you about how you're doing and teach you how to feel better.   Then you'll see the psychiatric doctor. This doctor can help you deal with troubling thoughts and feelings by giving you medicine. They'll make sure you know the plan for your care.   CCPS usually takes 3 to 6 visits. If you need more visits, we may have you start seeing a different psychiatric doctor for ongoing care.  If you have any questions or concerns, we'll be glad to talk with you.  About visits  Be open  At your visits, please talk openly about your problems. It may feel hard, but it's the best way for us to help you.  Cancelling visits  If you can't come to your visit, please call us right away at [phone]. If you don't cancel at least 24 hours (1 full day) before your visit, that's \"late cancellation.\"  Being late to visits  Being very late is the same as not showing up. You will be a \"no show\" if:  Your appointment starts with a BHC, and you're more than 15 minutes late for a 30-minute (half hour) visit. This will also cancel your appointment with the psychiatric doctor.  Your appointment is with a psychiatric doctor only, and you're more than 15 minutes late for a 30-minute (half hour) visit.  Your appointment is with a psychiatric doctor only, and you're more than 30 minutes late for a 60-minute (full hour) visit.  If you cancel late or don't show up 2 times within 6 months, we may end your care.   Getting help between visits  If you need help between visits, you can call us Monday to Friday from 8 a.m. to 4:30 p.m. at [phone].  Emergency care  Call 911 or go to the nearest emergency department if your life or someone else's life is in danger.  Call 988 anytime to reach the national Suicide and Crisis hotline.  Medicine " refills  To refill your medicine, call your pharmacy. You can also call [facility]'s Behavioral Access at [phone], Monday to Friday, 8 a.m. to 4:30 p.m. It can take 1 to 3 business days to get a refill.   Forms, letters, and tests  You may have papers to fill out, like FMLA, short-term disability, and workability. We can help you with these forms at your visits, but you must have an appointment. You may need more than 1 visit for this, to be in an intensive therapy program, or both.  Before we can give you medicine for ADHD, we may refer you to get tested for it or confirm it another way.  We may not be able to give you an emotional support animal letter.  We don't do mental health checks ordered by the court.   We don't do mental health testing, but we can refer you to get tested.   Thank you for choosing us for your care.  For informational purposes only. Not to replace the advice of your health care provider. Copyright   2022 [facility]. All rights reserved. [employer] [id number] - 12/22.
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
## 2023-06-12 ENCOUNTER — MYC MEDICAL ADVICE (OUTPATIENT)
Dept: PSYCHIATRY | Facility: CLINIC | Age: 63
End: 2023-06-12
Payer: COMMERCIAL

## 2023-06-12 NOTE — TELEPHONE ENCOUNTER
RN reviewed [employer] message that her medication is making her drowsy, sapped for energy, and sleeping in late with feelings she could sleep all day.     Per last visit note from 6/8/2023:    6/8/2023:  Since last visit patient since restarted clonidine.  This will be continued while we start Lexapro in hopes that we will confer similar benefits to fluoxetine but with fewer restless leg side effects.  Discussed any serotonergic agent may cause worsening restless leg symptoms.  Could consider buspirone if does not tolerate Lexapro.  No acute safety concerns today.  No SI.  No drug or alcohol use.  Patient encouraged to continue DBT.     Medication side effects and alternatives were reviewed. Health promotion activities recommended and reviewed today. All questions addressed. Education and counseling completed regarding risks and benefits of medications and psychotherapy options. Recommend therapy for additional support.      Treatment Plan:    Continue Adderall 10 mg daily as needed for ADHD.    Continue clonidine 0.2 mg daily for anxiety, ADHD.    Start Lexapro/escitalopram for anxiety and hx of depression: take 5 mg daily for 3-5 days then increase to 10 mg as tolerated.     Referral previously placed for psychological testing focused on MMPI administration/personality testing.    Continue all other cares per primary care provider.     Continue all other medications as reviewed per electronic medical record today.     Safety plan reviewed. To the Emergency Department as needed or call after hours crisis line at [phone] or [phone]. [place] Crisis Text Line. Text [place] to 705167 or Suicide LifeLine Chat: suicidepreventionlifeline.org/chat    Continue individual psychotherapy/DBT for additional support and ongoing development of nonpharmacologic coping skills and strategies.    Schedule an appointment with me in about 5 weeks or sooner as needed      RN called patient. She states that she started taking  escitaolram at 10 mg daily right away as that is what her prescription said. She states she is taking her clonidine at night as she usually dose and her amphetamine-dextroamphetamine at 11:00 am as she usually does.     She describes sleeping late which is not usual for her as she wakes up at 0500 and has a lot of energy. She feels her arms and legs are extremely heavy and feels like she did when she had Covid - like sleeping all the time.    She takes her escitaloram at night. Routing to provider high priority. RN will update patient once directive has been received.     Leena Fatima RN on 6/12/2023 at 1:23 PM

## 2023-06-12 NOTE — TELEPHONE ENCOUNTER
Per Dr. Valentine: Pt should take Lexapro 5 mg (half-tab) for 1-2 weeks and increase to 10 mg only once feeling ok on the 5 mg tab.     Called patient and let her know.     Abigail Rosenbaum RN on 6/12/2023 at 2:15 PM

## 2023-06-12 NOTE — CONFIDENTIAL NOTE
Pt should take Lexapro 5 mg (half-tab) for 1-2 weeks and increase to 10 mg only once feeling ok on the 5 mg tab. Thanks!

## 2023-06-12 NOTE — PROGRESS NOTES
Medication Therapy Management (MTM) Encounter    ASSESSMENT:                            Medication Adherence/Access: No issues identified    RLS: May benefit from splitting gabapentin dose if it doesn't make her too drowsy. May benefit from a trial of lyrica in place of gabapentin. If dopamine agonist needed, could consider long acting agent (ropinirole ER or Nupro patch) or potentially as referral to Movement Disorder Neurology.  May benefit from a trial of bulk forming laxative (Metamucil, Citrucel, Benefiber or FiberCon) for constipation from oral iron therapy. May benefit from updated iron studies.    ADHD/Anxiety: Stable.  Follows closely with psychiatry.     Back Pain: Stable    Atrophic Vaginitis: Stable.    PLAN:                            1. Recommend bulk forming laxative  2. Recommend updated iron studies  3. Could consider splitting gabapentin dose taking 400mg late afternoon and 400mg at bedtime.   4. Could consider lyrica in place of gabapentin    Follow-up: 3 months.    SUBJECTIVE/OBJECTIVE:                          Zari Francisco is a 63 year old female called for a follow up visit. She was referred to me from Susanna Dyer Follow up from 12/26/2022. First visit of 2023.    Reason for visit: RLS    Allergies/ADRs: Reviewed in chart  Past Medical History: Reviewed in chart  Tobacco: She reports that she has been smoking cigarettes. She has never used smokeless tobacco.  Alcohol: history of alcohol dependence; in recovery, 32 years  Caffeine: 1/2 caff 1/2 decaff; only in the am  Activity: works out with  6 times a month, 10,000 steps a day, works in her garden     Medication Adherence/Access: Per patient, misses medication 0 times per week.   Medication barriers: none.   The patient fills medications at [facility]: NO, fills medications at [facility].  Sets alarm 11am for adderall, 430 for ropinirole and 815 for evening meds    RLS: current therapy includes gabapentin 800mg at bedtime  (at 10pm), ropinirole 0.5mg during at 4:30pm and 1mg at 8:15pm (goes to bed at 10pm), ferrous gluconate 324mg daily with breakfast. Gabapentin works great in addition to the ropinirole. Gabapentin does make her drowsy. Used to take gabapentin 1-2 hours prior to bed but it made her too drowsy.  Patient is following with sleep clinic. Has a follow up in September.   Iron infusions were denied due to iron infusions not having FDA approval for RLS. Is having constipation with the ferrous gluconate.  Is eating All-bran. Has not had any improvement in her symptoms with daily iron supplement. Can sit outside and read 20-30 minutes and then her legs start jumping. Legs are worse later in the day anytime she just stops and doesn't do anything.  Has been taking Miralax 1 capful 5 times a week. If she takes daily, she has loose stools. Does report that she drinks plenty of water.     Lab Results   Component Value Date    ALBERT 46 02/16/2023     ADHD/Anxiety: current medications include adderall 10mg daily, escitalopram 10mg daily (started Friday and it made her so tired and she could barely wake up in the morning so Dr. Valentine recommended her to decrease her dose to 5mg daily for 2 weeks then increase to 10mg and was able to wake up this am), clonidine 0.2mg daily taking at night Follows with Rani Valentine.        9/29/2022     7:49 AM 11/9/2022    10:12 AM 6/5/2023     3:32 PM   PUSHPA-7 SCORE   Total Score  9 (mild anxiety) 9 (mild anxiety)   Total Score 7 9 9    9     Back Pain: current therapy includes ibuprofen 600mg every 8 hours as needed (maybe takes twice a week; depends how far out she is from her cortisone shot).    Atrophic Vaginitis: current therapy includes premarin cream 0.5grams twice a week as directed. Usually applying once a week and this is effective.     Today's Vitals: There were no vitals taken for this visit.  ----------------    I spent 31 minutes with this patient today. All changes were made via  collaborative practice agreement with Susanna Dyer A copy of the visit note was provided to the patient's provider(s).    A summary of these recommendations was sent via [employer].    Nay Francois, Pharm.D, [facility]  Medication Therapy Management Pharmacist    Telemedicine Visit Details  Type of service:  Telephone visit  Start Time: 8:32AM  End Time: 9:03AM     Medication Therapy Recommendations  Chronic constipation    Rationale: Synergistic therapy - Needs additional medication therapy - Indication   Recommendation: Start Medication - calcium polycarbophil 625 MG tablet   Status: Accepted - no CPA Needed         Restless leg syndrome    Current Medication: gabapentin (NEURONTIN) 400 MG capsule   Rationale: Incorrect administration - Dosage too low - Effectiveness   Recommendation: Increase Frequency   Status: Patient Agreed - Adherence/Education   Note: split gabapentin dose and take 1 in the late afternoon and 1 at bedtime

## 2023-06-13 ENCOUNTER — VIRTUAL VISIT (OUTPATIENT)
Dept: PHARMACY | Facility: CLINIC | Age: 63
End: 2023-06-13
Payer: COMMERCIAL

## 2023-06-13 DIAGNOSIS — F32.A ANXIETY AND DEPRESSION: ICD-10-CM

## 2023-06-13 DIAGNOSIS — F41.9 ANXIETY AND DEPRESSION: ICD-10-CM

## 2023-06-13 DIAGNOSIS — G25.81 RESTLESS LEG SYNDROME: Primary | ICD-10-CM

## 2023-06-13 DIAGNOSIS — M54.9 BACK PAIN, UNSPECIFIED BACK LOCATION, UNSPECIFIED BACK PAIN LATERALITY, UNSPECIFIED CHRONICITY: ICD-10-CM

## 2023-06-13 DIAGNOSIS — F98.8 ATTENTION DEFICIT DISORDER (ADD) WITHOUT HYPERACTIVITY: ICD-10-CM

## 2023-06-13 DIAGNOSIS — N95.2 ATROPHIC VAGINITIS: ICD-10-CM

## 2023-06-13 PROCEDURE — 99207 PR NO CHARGE LOS: CPT | Mod: VID | Performed by: PHARMACIST

## 2023-06-13 RX ORDER — AMOXICILLIN 500 MG
2400 CAPSULE ORAL DAILY
COMMUNITY

## 2023-06-13 NOTE — PATIENT INSTRUCTIONS
"Recommendations from today's MTM visit:                                                       May benefit from a trial of bulk forming laxative (Metamucil, Citrucel, Benefiber or FiberCon) f  Recommend updated iron studies  Could consider splitting gabapentin dose taking 400mg late afternoon and 400mg at bedtime.   Could consider lyrica in place of gabapentin    Follow-up: 2 weeks via [employer]    It was great speaking with you today.  I value your experience and would be very thankful for your time in providing feedback in our clinic survey. In the next few days, you may receive an email or text message from [employer] with a link to a survey related to your  clinical pharmacist.\"     To schedule another MTM appointment, please call the clinic directly or you may call the MTM scheduling line at [phone] or toll-free at [phone].     My Clinical Pharmacist's contact information:                                                      Please feel free to contact me with any questions or concerns you have.      Nay Francois, Pharm.D, BCACP  Medication Therapy Management Pharmacist      "

## 2023-06-23 ENCOUNTER — TRANSFERRED RECORDS (OUTPATIENT)
Dept: HEALTH INFORMATION MANAGEMENT | Facility: CLINIC | Age: 63
End: 2023-06-23

## 2023-06-29 ENCOUNTER — TELEPHONE (OUTPATIENT)
Dept: ONCOLOGY | Facility: CLINIC | Age: 63
End: 2023-06-29
Payer: COMMERCIAL

## 2023-06-29 DIAGNOSIS — M51.369 DDD (DEGENERATIVE DISC DISEASE), LUMBAR: ICD-10-CM

## 2023-06-29 DIAGNOSIS — M51.16 LUMBAR DISC HERNIATION WITH RADICULOPATHY: Primary | ICD-10-CM

## 2023-07-06 ENCOUNTER — THERAPY VISIT (OUTPATIENT)
Dept: PHYSICAL THERAPY | Facility: CLINIC | Age: 63
End: 2023-07-06
Payer: COMMERCIAL

## 2023-07-06 DIAGNOSIS — M54.16 LUMBAR RADICULOPATHY: Primary | ICD-10-CM

## 2023-07-06 DIAGNOSIS — M51.369 DDD (DEGENERATIVE DISC DISEASE), LUMBAR: ICD-10-CM

## 2023-07-06 PROCEDURE — 97530 THERAPEUTIC ACTIVITIES: CPT | Mod: GP | Performed by: PHYSICAL THERAPIST

## 2023-07-06 PROCEDURE — 97161 PT EVAL LOW COMPLEX 20 MIN: CPT | Mod: GP | Performed by: PHYSICAL THERAPIST

## 2023-07-06 PROCEDURE — 97110 THERAPEUTIC EXERCISES: CPT | Mod: GP | Performed by: PHYSICAL THERAPIST

## 2023-07-06 ASSESSMENT — ACTIVITIES OF DAILY LIVING (ADL)
LIGHT_TO_MODERATE_WORK: EXTREME DIFFICULTY
TWISTING/PIVOTING ON INVOLVED LEG: MODERATE DIFFICULTY
PUTTING ON SOCKS AND SHOES: EXTREME DIFFICULTY
WALKING_15_MINUTES_OR_GREATER: EXTREME DIFFICULTY
DEEP SQUATTING: EXTREME DIFFICULTY
SITTING FOR 15 MINUTES: EXTREME DIFFICULTY
STANDING FOR 15 MINUTES: MODERATE DIFFICULTY
WALKING_APPROXIMATELY_10_MINUTES: EXTREME DIFFICULTY
HOS_ADL_ITEM_SCORE_TOTAL: 24
ROLLING OVER IN BED: EXTREME DIFFICULTY
GETTING INTO AND OUT OF AN AVERAGE CAR: EXTREME DIFFICULTY
GOING DOWN 1 FLIGHT OF STAIRS: MODERATE DIFFICULTY
GOING UP 1 FLIGHT OF STAIRS: MODERATE DIFFICULTY
HOW_WOULD_YOU_RATE_YOUR_CURRENT_LEVEL_OF_FUNCTION_DURING_YOUR_USUAL_ACTIVITIES_OF_DAILY_LIVING_FROM_0_TO_100_WITH_100_BEING_YOUR_LEVEL_OF_FUNCTION_PRIOR_TO_YOUR_HIP_PROBLEM_AND_0_BEING_THE_INABILITY_TO_PERFORM_ANY_OF_YOUR_USUAL_DAILY_ACTIVITIES?: 25
WALKING_INITIALLY: EXTREME DIFFICULTY
HOS_ADL_SCORE(%): 40
WALKING_UP_STEEP_HILLS: SLIGHT DIFFICULTY
HOS_ADL_HIGHEST_POTENTIAL_SCORE: 60
RECREATIONAL ACTIVITIES: EXTREME DIFFICULTY
STEPPING UP AND DOWN CURBS: MODERATE DIFFICULTY
WALKING_DOWN_STEEP_HILLS: SLIGHT DIFFICULTY

## 2023-07-06 NOTE — PROGRESS NOTES
PHYSICAL THERAPY EVALUATION  Type of Visit: Evaluation    See electronic medical record for Abuse and Falls Screening details.    Subjective      Presenting condition or subjective complaint: Extreme hip andnlow back pain R lower back pain on the R into the R hip and down the R LE into the plantar aspect of the R foot (numbness)  Date of onset: 06/17/23  3 week history of symptoms after picking strawberries (June,17, 2023)  Relevant medical history:     Dates & types of surgery:      Prior diagnostic imaging/testing results: CT scan   MRI  Prior therapy history for the same diagnosis, illness or injury: Yes January 2023    Prior Level of Function   Transfers: Independent  Ambulation: Independent  ADL: Independent  IADL: Housekeeping, Laundry, Meal preparation, Work, not able to ride her horse     Living Environment  Social support: With a significant other or spouse   Type of home: House   Stairs to enter the home: Yes 7 Is there a railing: Yes   Ramp: No   Stairs inside the home: Yes 6 Is there a railing: Yes   Help at home: None  Equipment owned:       Employment: Yes   Hobbies/Interests: Gardening horses    Patient goals for therapy: Ride my horse and every day activities    Pain assessment: See objective evaluation for additional pain details     Objective   LUMBAR SPINE EVALUATION  PAIN:   INTEGUMENTARY (edema, incisions):   POSTURE:   GAIT:   Weightbearing Status:   Assistive Device(s):   Gait Deviations:   BALANCE/PROPRIOCEPTION:   WEIGHTBEARING ALIGNMENT:   NON-WEIGHTBEARING ALIGNMENT:    ROM:   PELVIC/SI SCREEN:   STRENGTH:   Work mechanical stresses:     Leisure mechanical stresses: walking  Functional disability score (SCOTT/STarT Back):  SCOTT 62%  VAS score (0-10): 7-8/10, not currently feeling R LE symptoms       ADDITIONAL HISTORY:  Present symptoms: central to R lower back pain into R lateral hip  Pain quality: Aching and Sharp with transitional  motions  Paresthesia (yes/no):  Yes intermittently  Symptoms (improving/unchanging/worsening):  May be improving minimally.   Symptoms commenced as a result of: picking strawberries (prolonged bending)   Condition occurred in the following environment:   outdoors     Symptoms at onset (back/thigh/leg): low back and R LE  Constant symptoms (back/thigh/leg): low back into the R hip  Intermittent symptoms (back/thigh/leg): R LE    Symptoms are made worse with the following: Always Bending, Always Sitting, Always Rising, Always Standing, Always (10-15 min tolerance) Walking, Always Lying - R side, Time of day - No effect, Always When still, Always On the move and Other - Lifting and unable to ride her horse.  Worse if wakes on her belly   Symptoms are made better with the following: Sometimes Lying on the L side and Other - ibuprofen ice helps temporarily    Disturbed sleep (yes/no):  Not for the past 2 nights   Sleeping postures (prone/sup/side R/L): L side or laying on her back with her knees bent    Year of first episode: fell in January 2021, slipped in black ice;  fell landing on the R hip and elbow    Previous treatments: YENNIFER June 23, 2023.  May be getting  relief now; R LE is better and sleep has been better the past 2 nights    Specific Questions:  Cough/Sneeze/Strain (pos/neg): positive  Bowel/Bladder (normal/abnormal): normal  Gait (normal/abnormal): antalgic  Medications (nil/NSAIDS/analg/steroids/anticoag/other):  NSAIDS, OTC analgesic and Other - Requip, Gabapentin for restless legs, Lexapro, Iron, fish oil, vitamins  Medical allergies:  Metals, sudafed, Claritin D, contrast dye  General health (excellent/good/fair/poor):  Excellent  Pertinent medical history:  Anxiety,depression, Bell's palsy, DDD lumbar, restless leg syndrome  Imaging (None/Xray/MRI/Other):  MRI - January 2023 - There is trace levocurvature of the lumbar spine. There is 3 mm retrolisthesis of L2 on L3 and 3 mm anterolisthesis of L3 on L4,  similar to prior. Vertebral body heights are maintained. There is mild edematous degenerative type marrow signal endplate change at L2-L3 and L4-L5, similar to prior. Normal distal spinal cord and cauda equina with conus medullaris at L1-L2. No extraspinal abnormality.   T12-L1: Normal disc height and signal. No herniation. Normal facets. No spinal canal or neural foraminal stenosis.      L1-L2: Normal disc height and signal. No herniation. Mild facet hypertrophy. No spinal canal or neural foraminal stenosis.  L2-L3: There is disc desiccation and diffuse disc bulge with right foraminal disc protrusion. Bilateral facet hypertrophy. Mild spinal canal stenosis. Moderate right and mild left neural foraminal stenosis.   L3-L4: There is disc desiccation and diffuse disc bulge. Bilateral ligament flavum and facet hypertrophy. Mild spinal canal stenosis. Mild bilateral neural foraminal stenosis.  L4-L5: There is disc desiccation and diffuse disc bulge. Bilateral facet hypertrophy. No spinal canal stenosis. Moderate left and mild right neural foraminal stenosis.  L5-S1: Normal disc height and signal. No herniation. Bilateral facet hypertrophy. No spinal canal or neural foraminal stenosis.  Recent or major surgery (yes/no):  no  Night pain (yes/no): no  Accidents (yes/no): yes 2021   Unexplained weight loss (yes/no): no  Barriers at home: no  Other red flags: no    Postural Observation:   Sitting: Neutral  Change of posture: Worse, with extension  Standing: Neutral  Lateral Shift: Nil., did look to have a mild lateral shift after sustained rotation in flexion  Other Observations: changes sitting posture frequently due to pain. Able to WB on R LE fully in standing  LROM   FIS min loss but walks hands down legs and back up them  EIS major loss with inc LBP   R SGIS mod/min loss  L SGIS mod loss with inc R sided back pain    Neurological:  Motor Deficit:  5/5 B LEs   Reflexes:  Symmetrical and brisk at knees, difficult to elicit  AJ  Sensory Deficit:  Not assessed   Neurodynamic tests:  (-) seated SLR in sitting    Test Movements:   During: produces, abolishes, increases, decreases, no effect, centralizing, peripheralizing   After: better, worse, no better, no worse, no effect, centralized, peripheralized    Symptomatic response Mechanical response    During testing After testing Effect - increased ROM, decreased ROM, or key functional test No Effect   Pretest symptoms standing: Central to R sided low back into lateral hip pain 7-8/10     Rep FIS       Rep EIS         Pretest symptoms lying: not assessed    During testing After testing Effect - increased ROM, decreased ROM, or key functional test No Effect   Rep AARON       Rep EIL         If required, pretest symptoms: Central to R sided low back into lateral hip pain 7-8/10   During testing After testing Effect - increased ROM, decreased ROM, or key functional test No Effect   Rep SGIS - R Increases  Central, L LB    Better    Inc EIS with inc PDM on L, improved ease when lowering to sit, stand and walk    Rep SGIS - L         Static Tests:   sustained rotation in flexion - R side ly - no change, during or after; no change in LROM nor functional activity (lower to sit, stand or walk)    Provisional Classification: Derangement - Asymmetrical, unilateral, symptoms above knee, with relevant lateral component    Potential Drivers of Pain and/or Disability: Comorbidities    Principle of Management:  Education:  Discussed centralization/peripheralization.  Hold on exercise should they cause symptoms into the R LE.  Trial of neutral sleeping posture with use of rolled towel to support lower back.  Do not push through pain with exercise or activity.     Equipment provided:  None but discussed use of rolled towel to support back in sitting and sleeping  Mechanical therapy (Y/N):  yes   Extension principle:    Lateral Principle:  R SGIS 10 reps, 6 times a day  Flexion principle:    Other:    MYOTOMES:    DTR S:   CORD SIGNS:   DERMATOMES:   NEURAL TENSION:   FLEXIBILITY:   LUMBAR/HIP Special Tests:    PELVIS/SI SPECIAL TESTS:   FUNCTIONAL TESTS:   PALPATION:   SPINAL SEGMENTAL CONCLUSIONS:       Assessment & Plan   CLINICAL IMPRESSIONS   Medical Diagnosis: lumbar radiculopathy. lumbar DDD    Treatment Diagnosis: lumbar radiculopathy, lumbar DDD   Impression/Assessment: Patient is a 63 year old female with lumbar complaints.  The following significant findings have been identified: Pain, Decreased ROM/flexibility, Decreased joint mobility, Impaired muscle performance, Decreased activity tolerance and Impaired posture. These impairments interfere with their ability to perform self care tasks, work tasks, recreational activities, household chores, household mobility and community mobility as compared to previous level of function.     Clinical Decision Making (Complexity):   Clinical Presentation: Stable/Uncomplicated  Clinical Presentation Rationale: based on medical and personal factors listed in PT evaluation  Clinical Decision Making (Complexity): Low complexity    PLAN OF CARE  Treatment Interventions:  Interventions: Manual Therapy, Neuromuscular Re-education, Therapeutic Activity, Therapeutic Exercise, Self-Care/Home Management    Long Term Goals     PT Goal 1  Goal Identifier: pain is severe with transitional movments for the back  Goal Description: patient will be able to rise from sitting without pain  Rationale: to maximize safety and independence with performance of ADLs and functional tasks;to maximize safety and independence within the home;to maximize safety and independence with self cares;to maximize safety and independence with transportation  Target Date: 08/04/23  PT Goal 2  Goal Identifier: standing  Goal Description: standing will not be limited by back or LE symptoms  Rationale: to maximize safety and independence with performance of ADLs and functional tasks;to maximize safety and independence  within the home;to maximize safety and independence within the community;to maximize safety and independence with self cares  Target Date: 09/03/23      Frequency of Treatment: 1 time a week  Duration of Treatment: 8 weeks    Recommended Referrals to Other Professionals: Physical Therapy  Education Assessment:   Learner/Method: Patient;No Barriers to Learning;Pictures/Video;Demonstration;Listening  Education Comments: HEP on phone/PTRx lico    Risks and benefits of evaluation/treatment have been explained.   Patient/Family/caregiver agrees with Plan of Care.     Evaluation Time:     PT Eval, Low Complexity Minutes (09314): 23   Present: Not applicable    Signing Clinician: Dee Fernandes PT

## 2023-07-13 ENCOUNTER — VIRTUAL VISIT (OUTPATIENT)
Dept: PSYCHIATRY | Facility: CLINIC | Age: 63
End: 2023-07-13
Payer: COMMERCIAL

## 2023-07-13 ENCOUNTER — VIRTUAL VISIT (OUTPATIENT)
Dept: BEHAVIORAL HEALTH | Facility: CLINIC | Age: 63
End: 2023-07-13
Payer: COMMERCIAL

## 2023-07-13 DIAGNOSIS — F10.11 ALCOHOL ABUSE, IN REMISSION: ICD-10-CM

## 2023-07-13 DIAGNOSIS — G25.81 RESTLESS LEG SYNDROME: ICD-10-CM

## 2023-07-13 DIAGNOSIS — F98.8 ATTENTION DEFICIT DISORDER (ADD) WITHOUT HYPERACTIVITY: ICD-10-CM

## 2023-07-13 DIAGNOSIS — F98.8 ATTENTION DEFICIT DISORDER (ADD) WITHOUT HYPERACTIVITY: Primary | ICD-10-CM

## 2023-07-13 DIAGNOSIS — Z86.59 HX OF MAJOR DEPRESSION: ICD-10-CM

## 2023-07-13 DIAGNOSIS — F41.1 GENERALIZED ANXIETY DISORDER: Primary | ICD-10-CM

## 2023-07-13 DIAGNOSIS — F41.1 GENERALIZED ANXIETY DISORDER: ICD-10-CM

## 2023-07-13 PROCEDURE — 99214 OFFICE O/P EST MOD 30 MIN: CPT | Mod: VID | Performed by: PSYCHIATRY & NEUROLOGY

## 2023-07-13 PROCEDURE — 90832 PSYTX W PT 30 MINUTES: CPT | Mod: VID | Performed by: SOCIAL WORKER

## 2023-07-13 RX ORDER — ESCITALOPRAM OXALATE 10 MG/1
5 TABLET ORAL DAILY
COMMUNITY
End: 2023-10-13 | Stop reason: DRUGHIGH

## 2023-07-13 RX ORDER — CLONIDINE HYDROCHLORIDE 0.2 MG/1
0.1 TABLET ORAL DAILY PRN
COMMUNITY
End: 2023-10-13 | Stop reason: ALTCHOICE

## 2023-07-13 RX ORDER — DEXTROAMPHETAMINE SACCHARATE, AMPHETAMINE ASPARTATE, DEXTROAMPHETAMINE SULFATE AND AMPHETAMINE SULFATE 2.5; 2.5; 2.5; 2.5 MG/1; MG/1; MG/1; MG/1
10 TABLET ORAL DAILY PRN
Qty: 30 TABLET | Refills: 0 | Status: SHIPPED | OUTPATIENT
Start: 2023-09-08 | End: 2023-10-13

## 2023-07-13 ASSESSMENT — PATIENT HEALTH QUESTIONNAIRE - PHQ9
10. IF YOU CHECKED OFF ANY PROBLEMS, HOW DIFFICULT HAVE THESE PROBLEMS MADE IT FOR YOU TO DO YOUR WORK, TAKE CARE OF THINGS AT HOME, OR GET ALONG WITH OTHER PEOPLE: NOT DIFFICULT AT ALL
SUM OF ALL RESPONSES TO PHQ QUESTIONS 1-9: 4
SUM OF ALL RESPONSES TO PHQ QUESTIONS 1-9: 4

## 2023-07-13 NOTE — PATIENT INSTRUCTIONS
Treatment Plan:  Continue Adderall 10 mg daily as needed for ADHD.  Change clonidine to 0.1 mg daily as needed for anxiety, ADHD. OK to discontinue if no longer needed/using.  Continue Lexapro/escitalopram 5 mg daily for anxiety and hx of depression.  Continue all other cares per primary care provider.   Continue all other medications as reviewed per electronic medical record today.   Safety plan reviewed. To the Emergency Department as needed or call after hours crisis line at [phone] or [phone]. [place] Crisis Text Line. Text [place] to 495407 or Suicide LifeLine Chat: suicidepreventionNduo.cnline.org/chat  Continue individual psychotherapy/DBT for additional support and ongoing development of nonpharmacologic coping skills and strategies.  Schedule an appointment with me in about 3 months or sooner as needed. Call [facility] at [phone] to schedule.  Follow up with primary care provider as planned or for acute medical concerns.  Call the psychiatric nurse line with medication questions or concerns at [phone].  Ping [employer] may be used to communicate with your provider, but this is not intended to be used for emergencies.    Have previously discussed risks of stimulant medication including, but not limited to, decreased appetite, risk of tics (and that they may be lasting), trouble sleeping, cardiac risks such as increased heart rate and blood pressure, and rare risk of sudden cardiac death.  Also risk of addiction/tolerance/dependence.    Patient Education   Collaborative Care Psychiatry Service  What to Expect  Here's what to expect from your Collaborative Care Psychiatry Service (CCPS).   About CCPS  CCPS means 2 people work together to help you get better. You'll meet with a behavioral health clinician and a psychiatric doctor. A behavioral health clinician helps people with mental health problems by talking with them. A psychiatric doctor helps people by giving them medicine.  How  "it works  At every visit, you'll see the behavioral health clinician (BHC) first. They'll talk with you about how you're doing and teach you how to feel better.   Then you'll see the psychiatric doctor. This doctor can help you deal with troubling thoughts and feelings by giving you medicine. They'll make sure you know the plan for your care.   CCPS usually takes 3 to 6 visits. If you need more visits, we may have you start seeing a different psychiatric doctor for ongoing care.  If you have any questions or concerns, we'll be glad to talk with you.  About visits  Be open  At your visits, please talk openly about your problems. It may feel hard, but it's the best way for us to help you.  Cancelling visits  If you can't come to your visit, please call us right away at [phone]. If you don't cancel at least 24 hours (1 full day) before your visit, that's \"late cancellation.\"  Being late to visits  Being very late is the same as not showing up. You will be a \"no show\" if:  Your appointment starts with a BHC, and you're more than 15 minutes late for a 30-minute (half hour) visit. This will also cancel your appointment with the psychiatric doctor.  Your appointment is with a psychiatric doctor only, and you're more than 15 minutes late for a 30-minute (half hour) visit.  Your appointment is with a psychiatric doctor only, and you're more than 30 minutes late for a 60-minute (full hour) visit.  If you cancel late or don't show up 2 times within 6 months, we may end your care.   Getting help between visits  If you need help between visits, you can call us Monday to Friday from 8 a.m. to 4:30 p.m. at [phone].  Emergency care  Call 911 or go to the nearest emergency department if your life or someone else's life is in danger.  Call 058 anytime to reach the national Suicide and Crisis hotline.  Medicine refills  To refill your medicine, call your pharmacy. You can also call [facility] [place] " at [phone], Monday to Friday, 8 a.m. to 4:30 p.m. It can take 1 to 3 business days to get a refill.   Forms, letters, and tests  You may have papers to fill out, like FMLA, short-term disability, and workability. We can help you with these forms at your visits, but you must have an appointment. You may need more than 1 visit for this, to be in an intensive therapy program, or both.  Before we can give you medicine for ADHD, we may refer you to get tested for it or confirm it another way.  We may not be able to give you an emotional support animal letter.  We don't do mental health checks ordered by the court.   We don't do mental health testing, but we can refer you to get tested.   Thank you for choosing us for your care.  For informational purposes only. Not to replace the advice of your health care provider. Copyright   2022 [facility]. All rights reserved. [employer] 476423 - 12/22.

## 2023-07-13 NOTE — PROGRESS NOTES
"Virtual Visit Details    Type of service:  Video Visit     Originating Location (pt. Location): {video visit patient location:306890::\"Home\"}  {PROVIDER LOCATION On-site should be selected for visits conducted from your clinic location or adjoining [facility] hospital, academic office, or other nearby [facility] building. Off-site should be selected for all other provider locations, including home:680734}  Distant Location (provider location):  {virtual location provider:229111}  Platform used for Video Visit: {Virtual Visit Platforms:731659::\"Vitriflex\"}  "

## 2023-07-13 NOTE — NURSING NOTE
Is the patient currently in the state of [place]? YES    Visit mode:VIDEO    If the visit is dropped, the patient can be reconnected by: VIDEO VISIT: Text to cell phone: [phone]    Will anyone else be joining the visit? NO      How would you like to obtain your AVS? MyChart    Are changes needed to the allergy or medication list? NO    Reason for visit: RECHECK      Care team has reviewed attendance agreement with patient. Patient advised that two failed appointments within 6 months may lead to termination of current episode of care.    Anat Davenport VF
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
## 2023-07-18 ENCOUNTER — THERAPY VISIT (OUTPATIENT)
Dept: PHYSICAL THERAPY | Facility: CLINIC | Age: 63
End: 2023-07-18
Attending: PREVENTIVE MEDICINE
Payer: COMMERCIAL

## 2023-07-18 DIAGNOSIS — M51.16 LUMBAR DISC HERNIATION WITH RADICULOPATHY: ICD-10-CM

## 2023-07-18 DIAGNOSIS — M51.369 DDD (DEGENERATIVE DISC DISEASE), LUMBAR: ICD-10-CM

## 2023-07-18 PROCEDURE — 97110 THERAPEUTIC EXERCISES: CPT | Mod: GP | Performed by: PHYSICAL THERAPIST

## 2023-07-21 ENCOUNTER — THERAPY VISIT (OUTPATIENT)
Dept: PHYSICAL THERAPY | Facility: CLINIC | Age: 63
End: 2023-07-21
Payer: COMMERCIAL

## 2023-07-21 DIAGNOSIS — M54.16 LUMBAR RADICULOPATHY: Primary | ICD-10-CM

## 2023-07-21 PROCEDURE — 97110 THERAPEUTIC EXERCISES: CPT | Mod: GP | Performed by: PHYSICAL THERAPIST

## 2023-07-23 DIAGNOSIS — G25.81 RESTLESS LEG SYNDROME: ICD-10-CM

## 2023-07-24 ENCOUNTER — OFFICE VISIT (OUTPATIENT)
Dept: ORTHOPEDICS | Facility: CLINIC | Age: 63
End: 2023-07-24
Payer: COMMERCIAL

## 2023-07-24 VITALS
WEIGHT: 147 LBS | HEIGHT: 66 IN | BODY MASS INDEX: 23.63 KG/M2 | DIASTOLIC BLOOD PRESSURE: 80 MMHG | SYSTOLIC BLOOD PRESSURE: 135 MMHG | HEART RATE: 72 BPM

## 2023-07-24 DIAGNOSIS — G89.29 CHRONIC RIGHT HIP PAIN: Primary | ICD-10-CM

## 2023-07-24 DIAGNOSIS — M24.159 LABRAL TEAR OF HIP, DEGENERATIVE: ICD-10-CM

## 2023-07-24 DIAGNOSIS — M51.369 DDD (DEGENERATIVE DISC DISEASE), LUMBAR: ICD-10-CM

## 2023-07-24 DIAGNOSIS — M51.16 LUMBAR DISC HERNIATION WITH RADICULOPATHY: ICD-10-CM

## 2023-07-24 DIAGNOSIS — Z91.041 CONTRAST MEDIA ALLERGY: ICD-10-CM

## 2023-07-24 DIAGNOSIS — M25.551 CHRONIC RIGHT HIP PAIN: Primary | ICD-10-CM

## 2023-07-24 PROCEDURE — 99214 OFFICE O/P EST MOD 30 MIN: CPT | Performed by: PREVENTIVE MEDICINE

## 2023-07-24 RX ORDER — ROPINIROLE 1 MG/1
TABLET, FILM COATED ORAL
Qty: 135 TABLET | Refills: 0 | Status: SHIPPED | OUTPATIENT
Start: 2023-07-24 | End: 2023-10-27

## 2023-07-24 RX ORDER — METHYLPREDNISOLONE 16 MG/1
TABLET ORAL
Qty: 4 TABLET | Refills: 0 | Status: SHIPPED | OUTPATIENT
Start: 2023-07-24 | End: 2023-09-08

## 2023-07-24 RX ORDER — CYCLOBENZAPRINE HCL 10 MG
5-10 TABLET ORAL 3 TIMES DAILY PRN
Qty: 60 TABLET | Refills: 1 | Status: SHIPPED | OUTPATIENT
Start: 2023-07-24 | End: 2024-01-02

## 2023-07-24 RX ORDER — PREDNISONE 20 MG/1
40 TABLET ORAL DAILY
Qty: 14 TABLET | Refills: 0 | Status: SHIPPED | OUTPATIENT
Start: 2023-07-24 | End: 2024-01-02

## 2023-07-24 ASSESSMENT — PAIN SCALES - GENERAL: PAINLEVEL: SEVERE PAIN (6)
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
## 2023-07-25 ENCOUNTER — THERAPY VISIT (OUTPATIENT)
Dept: PHYSICAL THERAPY | Facility: CLINIC | Age: 63
End: 2023-07-25
Attending: PREVENTIVE MEDICINE
Payer: COMMERCIAL

## 2023-07-25 DIAGNOSIS — M54.16 LUMBAR RADICULOPATHY: Primary | ICD-10-CM

## 2023-07-25 PROCEDURE — 97110 THERAPEUTIC EXERCISES: CPT | Mod: GP | Performed by: PHYSICAL THERAPIST

## 2023-08-15 ENCOUNTER — THERAPY VISIT (OUTPATIENT)
Dept: PHYSICAL THERAPY | Facility: CLINIC | Age: 63
End: 2023-08-15
Payer: COMMERCIAL

## 2023-08-15 DIAGNOSIS — M54.16 LUMBAR RADICULOPATHY: Primary | ICD-10-CM

## 2023-08-15 PROCEDURE — 97110 THERAPEUTIC EXERCISES: CPT | Mod: GP | Performed by: PHYSICAL THERAPIST

## 2023-08-19 ENCOUNTER — ANCILLARY PROCEDURE (OUTPATIENT)
Dept: MRI IMAGING | Facility: CLINIC | Age: 63
End: 2023-08-19
Attending: PREVENTIVE MEDICINE
Payer: COMMERCIAL

## 2023-08-19 DIAGNOSIS — G89.29 CHRONIC RIGHT HIP PAIN: ICD-10-CM

## 2023-08-19 DIAGNOSIS — M25.551 CHRONIC RIGHT HIP PAIN: ICD-10-CM

## 2023-08-19 DIAGNOSIS — M24.159 LABRAL TEAR OF HIP, DEGENERATIVE: ICD-10-CM

## 2023-08-19 PROCEDURE — 73721 MRI JNT OF LWR EXTRE W/O DYE: CPT | Mod: RT | Performed by: RADIOLOGY

## 2023-08-25 ENCOUNTER — THERAPY VISIT (OUTPATIENT)
Dept: PHYSICAL THERAPY | Facility: CLINIC | Age: 63
End: 2023-08-25
Payer: COMMERCIAL

## 2023-08-25 DIAGNOSIS — M54.16 LUMBAR RADICULOPATHY: Primary | ICD-10-CM

## 2023-08-25 PROCEDURE — 97110 THERAPEUTIC EXERCISES: CPT | Mod: GP | Performed by: PHYSICAL THERAPIST

## 2023-08-30 ENCOUNTER — TELEPHONE (OUTPATIENT)
Dept: GASTROENTEROLOGY | Facility: CLINIC | Age: 63
End: 2023-08-30
Payer: COMMERCIAL

## 2023-08-30 NOTE — TELEPHONE ENCOUNTER
"Endoscopy Scheduling Screen    Have you had a positive Covid test in the last 14 days?  No    Are you active on MyChart?   Yes    What insurance is in the chart?  Other:  [employer]    Ordering/Referring Provider:   GIOVANNI BUCKLEY        (If ordering provider performs procedure, schedule with ordering provider unless otherwise instructed. )    BMI: Estimated body mass index is 24.09 kg/m  as calculated from the following:    Height as of 7/24/23: 1.664 m (5' 5.5\").    Weight as of 7/24/23: 66.7 kg (147 lb).     Sedation Ordered  moderate sedation.   If patient BMI > 50 do not schedule in ASC.    Are you taking any prescription medications for pain?   No    Are you taking methadone or Suboxone?  No    Do you have a history of malignant hyperthermia or adverse reaction to anesthesia?  No    (Females) Are you currently pregnant?   No     Have you been diagnosed or told you have pulmonary hypertension?   No    Do you have an LVAD?  No    Have you been told you have moderate to severe sleep apnea?  No    Have you been told you have COPD, asthma, or any other lung disease?  No    Do you have any heart conditions?  No     Have you ever had or are you awaiting a heart or lung transplant?   No    Have you had a stroke or transient ischemic attack (TIA aka \"mini stroke\" in the last 6 months?   No    Have you been diagnosed with or been told you have cirrhosis of the liver?   No    Are you currently on dialysis?   No    Do you need assistance transferring?   No    BMI: Estimated body mass index is 24.09 kg/m  as calculated from the following:    Height as of 7/24/23: 1.664 m (5' 5.5\").    Weight as of 7/24/23: 66.7 kg (147 lb).     Is patients BMI > 40 and scheduling location [facility]?  No    Do you take the medication Phentermine, Ozempic or Wegovy?  No    Do you take the medication Naltrexone?  No    Do you take blood thinners?  No      Prep   Are you currently on dialysis or do you have chronic kidney disease?  No    Do you have " a diagnosis of diabetes?  No    Do you have a diagnosis of cystic fibrosis (CF)?  No    On a regular basis do you go 3 -5 days between bowel movements?  Yes (Extended Prep)    BMI > 40?  No    Preferred Pharmacy:        [id number] - [place], [place] - [address]  [address])  [address]  [place] [address]  Phone: [phone] Fax: [phone]      Final Scheduling Details   Colonoscopy prep sent?  Golytely Extended Prep    Procedure scheduled  Colonoscopy    Surgeon:  Davida     Date of procedure:  10/30     Schedule PAC:   No    Location  [facility]    Sedation   Moderate Sedation    Patient Reminders:   You will receive a call from a Nurse to review instructions and health history.  This assessment must be completed prior to your procedure.  Failure to complete the Nurse assessment may result in the procedure being cancelled.      On the day of your procedure, please designate an adult(s) who can drive you home stay with you for the next 24 hours. The medicines used in the exam will make you sleepy. You will not be able to drive.      You cannot take public transportation, ride share services, or non-medical taxi service without a responsible caregiver.  Medical transport services are allowed with the requirement that a responsible caregiver will receive you at your destination.  We require that drivers and caregivers are confirmed prior to your procedure.

## 2023-09-08 DIAGNOSIS — Z91.041 CONTRAST MEDIA ALLERGY: ICD-10-CM

## 2023-09-08 RX ORDER — METHYLPREDNISOLONE 16 MG/1
TABLET ORAL
Qty: 4 TABLET | Refills: 0 | Status: SHIPPED | OUTPATIENT
Start: 2023-09-08 | End: 2024-01-02

## 2023-09-13 ENCOUNTER — TRANSFERRED RECORDS (OUTPATIENT)
Dept: HEALTH INFORMATION MANAGEMENT | Facility: CLINIC | Age: 63
End: 2023-09-13
Payer: COMMERCIAL

## 2023-09-14 ENCOUNTER — THERAPY VISIT (OUTPATIENT)
Dept: PHYSICAL THERAPY | Facility: CLINIC | Age: 63
End: 2023-09-14
Payer: COMMERCIAL

## 2023-09-14 DIAGNOSIS — M54.16 LUMBAR RADICULOPATHY: Primary | ICD-10-CM

## 2023-09-14 PROCEDURE — 97110 THERAPEUTIC EXERCISES: CPT | Mod: GP | Performed by: PHYSICAL THERAPIST

## 2023-09-18 ENCOUNTER — MYC MEDICAL ADVICE (OUTPATIENT)
Dept: PSYCHIATRY | Facility: CLINIC | Age: 63
End: 2023-09-18
Payer: COMMERCIAL

## 2023-09-18 DIAGNOSIS — F41.1 GENERALIZED ANXIETY DISORDER: Primary | ICD-10-CM

## 2023-09-18 NOTE — TELEPHONE ENCOUNTER
RN received and reviewed MyC message from 11:41 AM today.     RN called the patient's pharmacy to verify prescriptions that the pharmacy had on hand.      Pharmacy reported that the patient did  her last Clonidine refill around 10:30 AM this morning. but did not  Escitalopram because they did not have any on file. Pharmacist stated they needed a new prescription to fill Escitalopram 5 mg.    Per treatment plan form 7/13/2023  Treatment Plan:  Continue Adderall 10 mg daily as needed for ADHD.  Change clonidine to 0.1 mg daily as needed for anxiety, ADHD. OK to discontinue if no longer needed/using.  Continue Lexapro/escitalopram 5 mg daily for anxiety and hx of depression.  Continue all other cares per primary care provider.   Continue all other medications as reviewed per electronic medical record today.   Safety plan reviewed. To the Emergency Department as needed or call after hours crisis line at [phone] or [phone]. [place] Crisis Text Line. Text [place] to 117890 or Suicide LifeLine Chat: suicidepreventionlifeline.org/chat  Continue individual psychotherapy/DBT for additional support and ongoing development of nonpharmacologic coping skills and strategies.  Schedule an appointment with me in about 5 weeks or sooner as needed. Call [facility] at [phone] to schedule.  Follow up with primary care provider as planned or for acute medical concerns.  Call the psychiatric nurse line with medication questions or concerns at [phone].  MyChart may be used to communicate with your provider, but this is not intended to be used for emergencies.    RN submitted refill request for Escitalopram 5 mg - Take 1 tablet daily.     Maryellen Calvo RN on 9/18/2023 at 2:29 PM

## 2023-09-18 NOTE — TELEPHONE ENCOUNTER
Provider dr. Marie Valentine indicated in this patients last visit note on 7/13/2023 that a future/return appointment was indicated 5 weeks after the 7/13/2023.    [facility] please call this patient to schedule a future appointment with Dr. Marie Calvo RN on 9/18/2023 at 2:43 PM

## 2023-09-18 NOTE — TELEPHONE ENCOUNTER
Date of Last Office Visit: 07/13/2023  Date of Next Office Visit: None RN notified A to call this patient and schedule a future appointment with Dr. Marie Valentine     No shows since last visit: Bull  Cancellations since last visit: 10/19/2023 (Unavailable)    Medication requested: Escitalopram 5 mg Date last ordered: 6/2023 - 10 mg dose Qty: 90 Refills: 0     Lapse in medication adherence greater than 5 days?: No  If yes, call patient and gather details: N/A  Medication refill request verified as identical to current order?: No  Result of Last DAM, VPA, Li+ Level, CBC, or Carbamazepine Level (at or since last visit): N/A    Last visit treatment plan:   Continue Adderall 10 mg daily as needed for ADHD.  Change clonidine to 0.1 mg daily as needed for anxiety, ADHD. OK to discontinue if no longer needed/using.  Continue Lexapro/escitalopram 5 mg daily for anxiety and hx of depression.  Continue all other cares per primary care provider.   Continue all other medications as reviewed per electronic medical record today.   Safety plan reviewed. To the Emergency Department as needed or call after hours crisis line at [phone] or [phone]. [place] Crisis Text Line. Text [place] to 548039 or Suicide LifeLine Chat: suicidepreventionlifeline.org/chat  Continue individual psychotherapy/DBT for additional support and ongoing development of nonpharmacologic coping skills and strategies.  Schedule an appointment with me in about 5 weeks or sooner as needed. Call [facility] at [phone] to schedule.  Follow up with primary care provider as planned or for acute medical concerns.  Call the psychiatric nurse line with medication questions or concerns at [phone].  MyChart may be used to communicate with your provider, but this is not intended to be used for emergencies.    []Medication refilled per  Medication Refill in Ambulatory Care  policy.  [x]Medication unable to be refilled by RN due to criteria not  met as indicated below:    []Eligibility - not seen in the last year   [x]Supervision - no future appointment   []Compliance - no shows, cancellations or lapse in therapy   []Verification - order discrepancy   []Controlled medication   []Medication not included in policy   []90-day supply request   [x]Other - Change in prescription

## 2023-09-19 RX ORDER — ESCITALOPRAM OXALATE 5 MG/1
5 TABLET ORAL DAILY
Qty: 90 TABLET | Refills: 0 | Status: SHIPPED | OUTPATIENT
Start: 2023-09-19 | End: 2023-10-13 | Stop reason: DRUGHIGH

## 2023-09-25 ASSESSMENT — SLEEP AND FATIGUE QUESTIONNAIRES
HOW LIKELY ARE YOU TO NOD OFF OR FALL ASLEEP WHILE LYING DOWN TO REST IN THE AFTERNOON WHEN CIRCUMSTANCES PERMIT: SLIGHT CHANCE OF DOZING
HOW LIKELY ARE YOU TO NOD OFF OR FALL ASLEEP WHILE SITTING QUIETLY AFTER LUNCH WITHOUT ALCOHOL: WOULD NEVER DOZE
HOW LIKELY ARE YOU TO NOD OFF OR FALL ASLEEP WHILE SITTING AND READING: MODERATE CHANCE OF DOZING
HOW LIKELY ARE YOU TO NOD OFF OR FALL ASLEEP IN A CAR, WHILE STOPPED FOR A FEW MINUTES IN TRAFFIC: WOULD NEVER DOZE
HOW LIKELY ARE YOU TO NOD OFF OR FALL ASLEEP WHILE SITTING AND TALKING TO SOMEONE: WOULD NEVER DOZE
HOW LIKELY ARE YOU TO NOD OFF OR FALL ASLEEP WHILE WATCHING TV: HIGH CHANCE OF DOZING
HOW LIKELY ARE YOU TO NOD OFF OR FALL ASLEEP WHEN YOU ARE A PASSENGER IN A CAR FOR AN HOUR WITHOUT A BREAK: SLIGHT CHANCE OF DOZING
HOW LIKELY ARE YOU TO NOD OFF OR FALL ASLEEP WHILE SITTING INACTIVE IN A PUBLIC PLACE: SLIGHT CHANCE OF DOZING

## 2023-09-26 ENCOUNTER — VIRTUAL VISIT (OUTPATIENT)
Dept: SLEEP MEDICINE | Facility: CLINIC | Age: 63
End: 2023-09-26
Payer: COMMERCIAL

## 2023-09-26 VITALS — WEIGHT: 140 LBS | HEIGHT: 65 IN | BODY MASS INDEX: 23.32 KG/M2

## 2023-09-26 DIAGNOSIS — G25.81 RESTLESS LEG SYNDROME: ICD-10-CM

## 2023-09-26 PROCEDURE — 99214 OFFICE O/P EST MOD 30 MIN: CPT | Mod: VID | Performed by: PHYSICIAN ASSISTANT

## 2023-09-26 RX ORDER — GABAPENTIN 300 MG/1
CAPSULE ORAL
Qty: 90 CAPSULE | Refills: 0 | Status: SHIPPED | OUTPATIENT
Start: 2023-09-26 | End: 2023-10-27

## 2023-09-26 RX ORDER — GABAPENTIN 100 MG/1
CAPSULE ORAL
Qty: 60 CAPSULE | Refills: 0 | Status: SHIPPED | OUTPATIENT
Start: 2023-09-26 | End: 2023-11-21

## 2023-09-26 ASSESSMENT — PAIN SCALES - GENERAL: PAINLEVEL: NO PAIN (0)
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
## 2023-09-26 NOTE — NURSING NOTE
Has patient had flu shot for current/most recent flu season? If so, when? Yes: 11/29/2022      Is the patient currently in the state of [place]? YES    Visit mode:VIDEO    If the visit is dropped, the patient can be reconnected by: VIDEO VISIT: Send to e-mail at: [email or web address]    Will anyone else be joining the visit? NO  (If patient encounters technical issues they should call [phone]    How would you like to obtain your AVS? MyChart    Are changes needed to the allergy or medication list? No    Reason for visit: RECHECK    Ericka MAYFIELD

## 2023-10-12 ENCOUNTER — TELEPHONE (OUTPATIENT)
Dept: GASTROENTEROLOGY | Facility: CLINIC | Age: 63
End: 2023-10-12

## 2023-10-12 DIAGNOSIS — Z12.11 ENCOUNTER FOR SCREENING COLONOSCOPY: Primary | ICD-10-CM

## 2023-10-12 RX ORDER — BISACODYL 5 MG/1
TABLET, DELAYED RELEASE ORAL
Qty: 4 TABLET | Refills: 0 | Status: SHIPPED | OUTPATIENT
Start: 2023-10-12 | End: 2024-01-02

## 2023-10-12 NOTE — TELEPHONE ENCOUNTER
Attempted to contact patient in order to complete pre assessment questions.     No answer. Left message to return call to [phone] option 4      Procedure details:    Patient scheduled for Colonoscopy  on 10/30/23.     Arrival time: 0900. Procedure time 0945    Pre op exam needed? N/A    Facility location: [facility]; [address], [place] [address]    Sedation type: Conscious sedation     Indication for procedure: screening       Chart review:     Electronic implanted devices? No    Diabetic? No      Medication review:    Anticoagulants? No    NSAIDS? No    Other medication HOLDING recommendations:  Iron supplements: HOLD 7 days before procedure.      Prep for procedure:     Bowel prep recommendation: Extended Golytely   Due to: constipation noted or reported.     Prep instructions sent via [employer]. Bowel prep script sent to    [id number] - [place], [place] - [address]  ( [address])      Nay Whitney RN  Endoscopy Procedure Pre Assessment RN

## 2023-10-13 ENCOUNTER — VIRTUAL VISIT (OUTPATIENT)
Dept: PSYCHIATRY | Facility: CLINIC | Age: 63
End: 2023-10-13
Payer: COMMERCIAL

## 2023-10-13 DIAGNOSIS — F10.11 ALCOHOL ABUSE, IN REMISSION: ICD-10-CM

## 2023-10-13 DIAGNOSIS — Z86.59 HX OF MAJOR DEPRESSION: ICD-10-CM

## 2023-10-13 DIAGNOSIS — F98.8 ATTENTION DEFICIT DISORDER (ADD) WITHOUT HYPERACTIVITY: ICD-10-CM

## 2023-10-13 DIAGNOSIS — F41.1 GENERALIZED ANXIETY DISORDER: Primary | ICD-10-CM

## 2023-10-13 PROCEDURE — 99214 OFFICE O/P EST MOD 30 MIN: CPT | Mod: 95 | Performed by: PSYCHIATRY & NEUROLOGY

## 2023-10-13 RX ORDER — DEXTROAMPHETAMINE SACCHARATE, AMPHETAMINE ASPARTATE, DEXTROAMPHETAMINE SULFATE AND AMPHETAMINE SULFATE 2.5; 2.5; 2.5; 2.5 MG/1; MG/1; MG/1; MG/1
10 TABLET ORAL DAILY PRN
Qty: 30 TABLET | Refills: 0 | Status: SHIPPED | OUTPATIENT
Start: 2023-11-13 | End: 2023-12-13

## 2023-10-13 RX ORDER — ESCITALOPRAM OXALATE 10 MG/1
10 TABLET ORAL DAILY
Qty: 90 TABLET | Refills: 1 | Status: SHIPPED | OUTPATIENT
Start: 2023-11-14 | End: 2023-12-27

## 2023-10-13 RX ORDER — DEXTROAMPHETAMINE SACCHARATE, AMPHETAMINE ASPARTATE, DEXTROAMPHETAMINE SULFATE AND AMPHETAMINE SULFATE 2.5; 2.5; 2.5; 2.5 MG/1; MG/1; MG/1; MG/1
10 TABLET ORAL DAILY PRN
Qty: 30 TABLET | Refills: 0 | Status: SHIPPED | OUTPATIENT
Start: 2023-12-14 | End: 2024-01-13

## 2023-10-13 RX ORDER — DEXTROAMPHETAMINE SACCHARATE, AMPHETAMINE ASPARTATE, DEXTROAMPHETAMINE SULFATE AND AMPHETAMINE SULFATE 2.5; 2.5; 2.5; 2.5 MG/1; MG/1; MG/1; MG/1
10 TABLET ORAL DAILY PRN
Qty: 30 TABLET | Refills: 0 | Status: SHIPPED | OUTPATIENT
Start: 2023-10-13 | End: 2023-11-12

## 2023-10-13 NOTE — PATIENT INSTRUCTIONS
Treatment Plan:  Continue Adderall 10 mg daily as needed for ADHD.  Discontinue clonidine  Continue Lexapro/escitalopram 10 mg daily for anxiety and hx of depression.  Continue all other cares per primary care provider.   Continue all other medications as reviewed per electronic medical record today.   Safety plan reviewed. To the Emergency Department as needed or call after hours crisis line at [phone] or [phone]. [place] Crisis Text Line. Text [place] to 950182 or Suicide LifeLine Chat: suicidepreventionSkycureline.org/chat  Continue individual psychotherapy/DBT for additional support and ongoing development of nonpharmacologic coping skills and strategies.  Schedule an appointment with me in about 3 months or sooner as needed. Call [facility] at [phone] to schedule.  Follow up with primary care provider as planned or for acute medical concerns.  Call the psychiatric nurse line with medication questions or concerns at [phone].  [employer] may be used to communicate with your provider, but this is not intended to be used for emergencies.    Have previously discussed risks of stimulant medication including, but not limited to, decreased appetite, risk of tics (and that they may be lasting), trouble sleeping, cardiac risks such as increased heart rate and blood pressure, and rare risk of sudden cardiac death.  Also risk of addiction/tolerance/dependence.

## 2023-10-13 NOTE — NURSING NOTE
Is the patient currently in the state of [place]? YES    Visit mode:VIDEO    If the visit is dropped, the patient can be reconnected by: VIDEO VISIT: Text to cell phone:   Telephone Information:   Mobile [phone]       Will anyone else be joining the visit? No  (If patient encounters technical issues they should call [phone])    How would you like to obtain your AVS? MyChart    Are changes needed to the allergy or medication list? No    Rooming Documentation: Patient will complete qnrs in mychart.    Reason for visit: CHAPARRO Larios
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
## 2023-10-13 NOTE — PROGRESS NOTES
"Virtual Visit Details    Type of service:  Video Visit     Originating Location (pt. Location): {video visit patient location:167857::\"Home\"}  {PROVIDER LOCATION On-site should be selected for visits conducted from your clinic location or adjoining Health system hospital, academic office, or other nearby Health system building. Off-site should be selected for all other provider locations, including home:578802}  Distant Location (provider location):  {virtual location provider:281269}  Platform used for Video Visit: {Virtual Visit Platforms:346315::\"[employer]\"}  "

## 2023-10-17 ENCOUNTER — TELEPHONE (OUTPATIENT)
Dept: GASTROENTEROLOGY | Facility: CLINIC | Age: 63
End: 2023-10-17
Payer: COMMERCIAL

## 2023-10-17 NOTE — TELEPHONE ENCOUNTER
Caller: Zari Francisco    Reason for Reschedule/Cancellation (please be detailed, any staff messages or encounters to note?): PT UNAVAILABLE      Prior to reschedule please review:  Ordering Provider: INA  Sedation per order: MODERATE  Does patient have any ASC Exclusions, please identify?: N      Notes on Cancelled Procedure:  Procedure: Lower Endoscopy [Colonoscopy]   Date: 10/30/2023  Location: [facility]; [address], [place] [address]  Surgeon: JANAY      Rescheduled: Yes  Procedure: Lower Endoscopy [Colonoscopy]   Date: 01/11/2024  Location: [facility]; [address], [place] [id number]  Surgeon: CORDELL  Sedation Level Scheduled  MODERATE,  Reason for Sedation Level PER ORDER  Prep/Instructions updated and sent: VIA DTVCast       Send In - basket message to Panc - Walker Pool if EUS  procedure is canceled or rescheduled: [ N/A, YES or NO] NA

## 2023-10-20 ENCOUNTER — ANCILLARY PROCEDURE (OUTPATIENT)
Dept: GENERAL RADIOLOGY | Facility: CLINIC | Age: 63
End: 2023-10-20
Attending: PREVENTIVE MEDICINE
Payer: COMMERCIAL

## 2023-10-20 DIAGNOSIS — M51.16 LUMBAR DISC HERNIATION WITH RADICULOPATHY: ICD-10-CM

## 2023-10-20 DIAGNOSIS — M51.369 DDD (DEGENERATIVE DISC DISEASE), LUMBAR: ICD-10-CM

## 2023-10-20 PROCEDURE — A9585 GADOBUTROL INJECTION: HCPCS | Performed by: RADIOLOGY

## 2023-10-20 PROCEDURE — 62323 NJX INTERLAMINAR LMBR/SAC: CPT | Performed by: RADIOLOGY

## 2023-10-20 RX ORDER — IOPAMIDOL 408 MG/ML
2 INJECTION, SOLUTION INTRATHECAL ONCE
Status: COMPLETED | OUTPATIENT
Start: 2023-10-20 | End: 2023-10-20

## 2023-10-20 RX ORDER — BUPIVACAINE HYDROCHLORIDE 5 MG/ML
2 INJECTION, SOLUTION PERINEURAL ONCE
Status: COMPLETED | OUTPATIENT
Start: 2023-10-20 | End: 2023-10-20

## 2023-10-20 RX ORDER — GADOBUTROL 604.72 MG/ML
0.1 INJECTION INTRAVENOUS ONCE
Status: COMPLETED | OUTPATIENT
Start: 2023-10-20 | End: 2023-10-20

## 2023-10-20 RX ORDER — LIDOCAINE HYDROCHLORIDE 10 MG/ML
5 INJECTION, SOLUTION EPIDURAL; INFILTRATION; INTRACAUDAL; PERINEURAL ONCE
Status: COMPLETED | OUTPATIENT
Start: 2023-10-20 | End: 2023-10-20

## 2023-10-20 RX ORDER — METHYLPREDNISOLONE ACETATE 80 MG/ML
80 INJECTION, SUSPENSION INTRA-ARTICULAR; INTRALESIONAL; INTRAMUSCULAR; SOFT TISSUE ONCE
Status: COMPLETED | OUTPATIENT
Start: 2023-10-20 | End: 2023-10-20

## 2023-10-20 RX ADMIN — BUPIVACAINE HYDROCHLORIDE 10 MG: 5 INJECTION, SOLUTION PERINEURAL at 09:43

## 2023-10-20 RX ADMIN — LIDOCAINE HYDROCHLORIDE 5 ML: 10 INJECTION, SOLUTION EPIDURAL; INFILTRATION; INTRACAUDAL; PERINEURAL at 09:43

## 2023-10-20 RX ADMIN — GADOBUTROL 6.35 ML: 604.72 INJECTION INTRAVENOUS at 09:43

## 2023-10-20 RX ADMIN — METHYLPREDNISOLONE ACETATE 80 MG: 80 INJECTION, SUSPENSION INTRA-ARTICULAR; INTRALESIONAL; INTRAMUSCULAR; SOFT TISSUE at 09:43

## 2023-10-20 NOTE — PROGRESS NOTES
Zari was seen in X-ray today for a Lumbar epidural injection. Patient rated pain before procedure 4/10. After procedure patient rated pain 0/10.   This pain level is acceptable to patient. Patient discharged home with .

## 2023-10-20 NOTE — DISCHARGE SUMMARY
AFTER YOU GO HOME    DO relax; minimize your activity for 24 hours  You may resume normal activity tomorrow  You may remove the bandage in the evening or next morning  You may resume bathing the next day  Drink at least 4 extra glasses of fluid today if not on fluid restrictions  DO NOT drive or operate machinery at home or at work for at least 24 hours      VISIT THE EMERGENCY ROOM OR URGENT CARE IF:    There is redness or swelling at the injection site  There is discharge from the injection site  You develop a temperature of 101  F or greater      ADDITIONAL INSTRUCTIONS:     You may resume your Coumadin or other blood thinner at your regular dose today.  Follow up with your physician to have your INR rechecked if indicated.  If you gain no relief from the injection after two (2) weeks, follow-up with your provider for your options.        Contacts:    During business hours from 8 to 5 pm, you may call [phone] to reach a nurse advisor at [facility].  After hours, call [facility]  [phone].  Ask for the Radiologist on-call.  Someone is on-call 24 hrs/day.  [facility] Toll Free Number   [phone]

## 2023-10-27 ENCOUNTER — MYC MEDICAL ADVICE (OUTPATIENT)
Dept: SLEEP MEDICINE | Facility: CLINIC | Age: 63
End: 2023-10-27
Payer: COMMERCIAL

## 2023-10-27 DIAGNOSIS — G25.81 RESTLESS LEG SYNDROME: ICD-10-CM

## 2023-10-27 RX ORDER — ROPINIROLE 1 MG/1
TABLET, FILM COATED ORAL
Qty: 135 TABLET | Refills: 0 | Status: SHIPPED | OUTPATIENT
Start: 2023-10-27 | End: 2024-04-01

## 2023-10-27 RX ORDER — GABAPENTIN 300 MG/1
CAPSULE ORAL
Qty: 90 CAPSULE | Refills: 0 | Status: SHIPPED | OUTPATIENT
Start: 2023-10-27 | End: 2023-11-28

## 2023-10-27 NOTE — TELEPHONE ENCOUNTER
Patient requesting refills of Gabapentin 300mg and Ropinerole 1mg.  Gabapentin refill pended, Ropinerole was prescribed by primary and has a pending refill for them.      Pending Prescriptions:                       Disp   Refills    gabapentin (NEURONTIN) 300 MG capsule     90 cap*0            Sig: Take 3 tablets at bedtime          Last Written Prescription Date: 9/26/23    Last Fill Quantity: 90   # refills: 0  Last Office Visit: 9/26/23  Future Office visit:  NA     Routing refill request to provider for review/approval because:  Drug not on the G, P or [facility] refill protocol or controlled substance

## 2023-10-28 ENCOUNTER — HEALTH MAINTENANCE LETTER (OUTPATIENT)
Age: 63
End: 2023-10-28

## 2023-11-06 PROBLEM — M54.16 LUMBAR RADICULOPATHY: Status: RESOLVED | Noted: 2023-01-05 | Resolved: 2023-11-06

## 2023-11-06 NOTE — PROGRESS NOTES
09/14/23 0500   Appointment Info   Signing clinician's name / credentials Grant Fernnades, PT  (Pt called to let us know she would be 10' late - she was 10' late)   Total/Authorized Visits 8 per PT - eval and treat   Visits Used 7   Medical Diagnosis lumbar radiculopathy. lumbar DDD   PT Tx Diagnosis lumbar radiculopathy, lumbar DDD   Progress Note/Certification   Onset of illness/injury or Date of Surgery 06/17/23   Therapy Frequency 1 time a week   Predicted Duration 8 weeks   Progress Note Due Date 09/14/23   Progress Note Completed Date 07/06/23       Present No   GOALS   PT Goals 2   PT Goal 1   Goal Identifier pain is severe with transitional movements for the back   Goal Description patient will be able to rise from sitting without pain   Rationale to maximize safety and independence with performance of ADLs and functional tasks;to maximize safety and independence within the home;to maximize safety and independence with self cares;to maximize safety and independence with transportation   Goal Progress sitting continues to be bothersome and can feel the R sided symptoms with rise from sit.  (will trial sitting on rolled towel and/or support the thoracic spine)   Target Date 09/24/23   PT Goal 2   Goal Identifier standing   Goal Description standing will not be limited by back or LE symptoms   Rationale to maximize safety and independence with performance of ADLs and functional tasks;to maximize safety and independence within the home;to maximize safety and independence within the community;to maximize safety and independence with self cares   Goal Progress No issues with walking or standing - able to walk 2-3 miles a day and up to 4+miles without issues  (progress made)   Target Date 09/03/23   Date Met 08/25/23   Subjective Report   Subjective Report Patient relates that she is definitely improving.  She has been able to increase her workouts without increased symptoms - quad stretch,  single leg dead lift and a hip flexor stretch with leg over the edge of the table.  Sitting is still very bothersome and will increase symptoms into the R posterior thigh. Lumbar flexion biased  exercise and knee to opp shoulder, AARON are all helpful.  pleased with progress and does feel that there has been good changes with adding strengthening.  Again, sitting is still main complaint.   Objective Measures   Objective Measures Objective Measure 1;Objective Measure 2;Objective Measure 3;Objective Measure 4   Objective Measure 1   Objective Measure pain   Details 3/10 lower back on the R side into posterior hip.  When she is more painful she feels hyper-sensitive (only one time and was last week))   Objective Measure 2   Objective Measure LROM   Details FIS nil loss, EIS nil loss, R SGIS min loss, L SGIS mod loss with sym on the R hip and low back  (SGIS cleared with strengthening - better after each exercise)   Objective Measure 3   Objective Measure functional tools   Details Deacon STarT Medium (5)   Objective Measure 4   Objective Measure PROM R hip   Details full passive flexion without pain   Treatment Interventions (PT)   Interventions Therapeutic Procedure/Exercise   Therapeutic Procedure/Exercise   Therapeutic Procedures: strength, endurance, ROM, flexibility minutes (34169) 40   PTRx Ther Proc 1 Lumbar Flexion Rotation   PTRx Ther Proc 1 - Details 5 min - continue for HEP   PTRx Ther Proc 2 Piriformis Stretch Above 90 Degrees Supine   PTRx Ther Proc 2 - Details 10 reps - R knee to L shoulder - HEP   PTRx Ther Proc 3 Single Knee to Chest   PTRx Ther Proc 3 - Details 10 reps -  HEP   PTRx Ther Proc 4 Supine Abdominal Exercise #7 (Alternate Arm Extension with Legs at 90/90)   PTRx Ther Proc 4 - Details 90/90 positioning 30 sec x 3 reps - improved SGIS after ea rep double arms 10 reps - SGIS again less  painful alternating arms 10 reps - SGIS still feels good and now sym more paraspinal vs lat R hip with L SGIS    PTRx Ther Proc 5 Supine Abdominal Exercise #4 (Leg Extension)   PTRx Ther Proc 5 - Details 10 reps ea LE out - cues needed to engage muscles and prevent arching - NE during NE after; pain less with L SGIS for the R side   PTRx Ther Proc 6 Side Plank Modified Knees   PTRx Ther Proc 6 - Details 1 minute ea side - SGIS continue to improve with strengthening   PTRx Ther Proc 7 Supine Abdominal Exercise #4 (Leg Extension)   PTRx Ther Proc 7 - Details 10 reps ea LE out - cues needed to engage muscles and prevent arching - NE during NE after and no change in LROM   PTRx Ther Proc 8 Side Plank Modified Knees   PTRx Ther Proc 8 - Details 1 minute ea side - had normalized R SGIS after doing R side down (a little stiffer after L side down) at home will do L side first than R   Skilled Intervention advanced strengthening   Patient Response/Progress better after PT with inc LROM and felt core work was helpful   Therapeutic Activity   Therapeutic Activities: dynamic activities to improve functional performance minutes (77140) 5   PTRx Ther Act 1 Posture Correction with Lumbar Roll   PTRx Ther Act 1 - Details worked on using the roll to support the thoracic spine - still felt symptoms into the posterior R hip when sitting then took the roll and had patient sit with the roll under her buttock/sits bones.  No worsening of symptoms.  Discussed trial when driving out of town for her back packing/hiking trip in [place]   PTRx Ther Act 2 Understanding Mechanical Diagnosis and Therapy: Centralization   PTRx Ther Act 2 - Details No Notes   Education   Learner/Method Patient;No Barriers to Learning;Pictures/Video;Demonstration;Listening   Education Comments HEP on phone/PTRx lico   Plan   Home program see PTRx   Updates to plan of care advanced core work   Plan for next session adv core and continue to maintain more a flexed lumbar position during strengthening.   Comments   Comments PN due   Total Session Time   Timed Code Treatment Minutes  45   Total Treatment Time (sum of timed and untimed services) 45       DISCHARGE  Reason for Discharge: Patient has failed to schedule further appointments.  Patient had made progress while in PT but R sided symptoms did continue.  She was responding favorably to a lumbar flexion biased exercise program and was doing well with core strengthening.    Equipment Issued:     Discharge Plan: Patient to continue home program.    Referring Provider:  Jacky Sweeney

## 2023-11-20 ENCOUNTER — MYC MEDICAL ADVICE (OUTPATIENT)
Dept: SLEEP MEDICINE | Facility: CLINIC | Age: 63
End: 2023-11-20
Payer: COMMERCIAL

## 2023-11-21 NOTE — TELEPHONE ENCOUNTER
Pending Prescriptions:                       Disp   Refills    gabapentin (NEURONTIN) 100 MG capsule     60 cap*0            Sig: Take 2 caps at 12 pm          Last Written Prescription Date:  9/26/23  Last Fill Quantity: 60   # refills: 0  Last Office Visit: 9/26/23  Future Office visit:  1/16/24     Routing refill request to provider for review/approval because:  Drug not on the G, P or [facility] refill protocol or controlled substance

## 2023-11-22 RX ORDER — GABAPENTIN 100 MG/1
CAPSULE ORAL
Qty: 60 CAPSULE | Refills: 0 | Status: SHIPPED | OUTPATIENT
Start: 2023-11-22 | End: 2023-12-20

## 2023-11-27 ENCOUNTER — MYC MEDICAL ADVICE (OUTPATIENT)
Dept: SLEEP MEDICINE | Facility: CLINIC | Age: 63
End: 2023-11-27
Payer: COMMERCIAL

## 2023-11-28 DIAGNOSIS — G25.81 RESTLESS LEG SYNDROME: ICD-10-CM

## 2023-11-28 RX ORDER — GABAPENTIN 300 MG/1
CAPSULE ORAL
Qty: 90 CAPSULE | Refills: 0 | Status: SHIPPED | OUTPATIENT
Start: 2023-11-28 | End: 2023-12-20

## 2023-11-28 NOTE — TELEPHONE ENCOUNTER
Gabapentin 300 MG Tablets      Last Written Prescription Date:  10/27/2023  Last Fill Quantity: 90,   # refills: 0  Last Office Visit: 9/26/2023  Future Office visit:  1/16/2024 with Fox.  Edie Vogel, CMA

## 2023-11-30 NOTE — TELEPHONE ENCOUNTER
rOPINIRole (REQUIP) 1 MG tablet 45 tablet 0 12/11/2017  No   Sig: TAKE 1/2 TABLET BY MOUTH DURING THE DAY AND 1 TABLET AT BEDTIME AS DIRECTED   Class: E-Prescribe   Notes to Pharmacy: Due for annual office visit for further refills. Letter sent to patient.   Order: [id number]   E-Prescribing Status: Receipt confirmed by pharmacy (12/11/2017  9:37 AM CST)     Patient requesting a 90 day bernadette refill. No future appointments. Bernadette refill was provided. Jenny Wilks RN    
n/a

## 2023-12-19 ENCOUNTER — MYC MEDICAL ADVICE (OUTPATIENT)
Dept: SLEEP MEDICINE | Facility: CLINIC | Age: 63
End: 2023-12-19
Payer: COMMERCIAL

## 2023-12-19 DIAGNOSIS — G25.81 RESTLESS LEG SYNDROME: ICD-10-CM

## 2023-12-20 RX ORDER — GABAPENTIN 300 MG/1
CAPSULE ORAL
Qty: 90 CAPSULE | Refills: 0 | Status: SHIPPED | OUTPATIENT
Start: 2023-12-28 | End: 2024-01-16

## 2023-12-20 RX ORDER — GABAPENTIN 100 MG/1
CAPSULE ORAL
Qty: 60 CAPSULE | Refills: 0 | Status: SHIPPED | OUTPATIENT
Start: 2023-12-22 | End: 2024-01-16

## 2023-12-20 NOTE — TELEPHONE ENCOUNTER
Patient requesting refills of Gabapentin 100mg and 300mg scripts.  Requesting 90 day scripts, left at 30 days and routing to provider to make decision on amount to order.      Pending Prescriptions:                       Disp   Refills    gabapentin (NEURONTIN) 300 MG capsule     90 cap*0            Sig: Take 3 tablets at bedtime    gabapentin (NEURONTIN) 100 MG capsule     60 cap*0            Sig: Take 2 caps at 12 pm          Last Written Prescription Date:  11/28/23, 11/22/23  Last Fill Quantity: 90, 60   # refills: 0,0  Last Office Visit: 9/26/23  Future Office visit:       Routing refill request to provider for review/approval because:  Drug not on the [facility] P or [facility] refill protocol or controlled substance

## 2023-12-26 ENCOUNTER — MYC MEDICAL ADVICE (OUTPATIENT)
Dept: PSYCHIATRY | Facility: CLINIC | Age: 63
End: 2023-12-26
Payer: COMMERCIAL

## 2023-12-26 ENCOUNTER — MYC MEDICAL ADVICE (OUTPATIENT)
Dept: SLEEP MEDICINE | Facility: CLINIC | Age: 63
End: 2023-12-26
Payer: COMMERCIAL

## 2023-12-26 DIAGNOSIS — G25.81 RESTLESS LEG SYNDROME: ICD-10-CM

## 2023-12-26 DIAGNOSIS — F41.1 GENERALIZED ANXIETY DISORDER: ICD-10-CM

## 2023-12-27 RX ORDER — ESCITALOPRAM OXALATE 10 MG/1
10 TABLET ORAL DAILY
Qty: 5 TABLET | Refills: 0 | Status: SHIPPED | OUTPATIENT
Start: 2023-12-27 | End: 2024-01-19 | Stop reason: DRUGHIGH

## 2023-12-27 RX ORDER — BISACODYL 5 MG/1
TABLET, DELAYED RELEASE ORAL
Qty: 4 TABLET | Refills: 0 | Status: SHIPPED | OUTPATIENT
Start: 2023-12-27 | End: 2024-05-09

## 2023-12-27 RX ORDER — GABAPENTIN 300 MG/1
CAPSULE ORAL
Qty: 90 CAPSULE | Refills: 0 | Status: CANCELLED | OUTPATIENT
Start: 2023-12-28

## 2023-12-27 NOTE — TELEPHONE ENCOUNTER
"RN reviewed MyC message from the patient, \"I m working out of town this week and I forgot my Eacitalopram at home.  It would be a total of three nights without it. That may be too many days in a row without taking it. Would you please call in two tablets to the [facility] at [address] in [place], [place]. I can pick it up after work tomorrow and I ll be home Friday night. \"      RN could not find the requested [facility] in [place] in our EPIC database.      RN LVM for the patient indicating that the refill would be sent to the [facility] in [place], but can still be filled at the requested [facility] in [place].  She just need to tell that [facility] to look at the [place] refill database.       RN routing rto Dr. Valentine for review, due to patient forgot her medication at home.  5 tablets is pended for provider review and approval.     Maryellen Calvo RN on 12/27/2023 at 11:07 AM          "

## 2023-12-27 NOTE — TELEPHONE ENCOUNTER
Rescheduled Colonoscopy     Pre visit planning completed.      Procedure details:    Patient scheduled for Colonoscopy  on 01.11.2024.     Arrival time: 0650. Procedure time 0735    Pre op exam needed? N/A    Facility location: [facility]; [address], [place] [address]    Sedation type: Conscious sedation     Indication for procedure: Screen for colon cancer       Chart review:     Electronic implanted devices? No    Recent diagnosis of diverticulitis within the last 6 weeks? No    Diabetic? No    Diabetic medication HOLDING recommendations: (if applicable)  Oral diabetic medications: N/A  Diabetic injectables: N/A  Insulin: N/A      Medication review:    Anticoagulants? No    NSAIDS? No NSAID medications per patient's medication list.  RN will verify with pre-assessment call.    Other medication HOLDING recommendations:  Iron supplements: HOLD 7 days before procedure.      Prep for procedure:     Bowel prep recommendation: Extended prep Golytely    Due to:  constipation noted or reported.     Prep instructions sent via [employer] Bowel prep script sent to    [id number] - [place], [place] - [address]  [address])          Nay Joaquin RN  Endoscopy Procedure Pre Assessment RN  [phone] option 4

## 2023-12-28 NOTE — TELEPHONE ENCOUNTER
Pre assessment completed for upcoming procedure.    Procedure details:    Arrival time and facility location reviewed.    Pre op exam needed? N/A    Designated  policy reviewed. Instructed to have someone stay 6 hours post procedure.     COVID policy reviewed.      Medication review:    Medications reviewed. Please see supporting documentation below. Holding recommendations discussed (if applicable).       Prep for procedure:     Reviewed procedure prep instructions.     Patient verbalized understanding and had no questions or concerns at this time.        Corinne Kliber, RN  Endoscopy Procedure Pre Assessment RN  [phone] option 4

## 2023-12-30 ENCOUNTER — NURSE TRIAGE (OUTPATIENT)
Dept: NURSING | Facility: CLINIC | Age: 63
End: 2023-12-30
Payer: COMMERCIAL

## 2023-12-30 ENCOUNTER — TELEPHONE (OUTPATIENT)
Dept: SLEEP MEDICINE | Facility: CLINIC | Age: 63
End: 2023-12-30
Payer: COMMERCIAL

## 2023-12-30 NOTE — TELEPHONE ENCOUNTER
General Call    Contacts         Type Contact Phone/Fax    12/30/2023 12:35 PM CST Phone (Incoming) Zari Francisco (Self) [phone] (M)          Reason for Call:  Gabapentin 300 mg    What are your questions or concerns:  Pharmacy is saying they never got the prescription refill     Date of last appointment with provider: 09/26/2023    Could we send this information to you in [facility] or would you prefer to receive a phone call?:   Patient would prefer a phone call   Okay to leave a detailed message?: Yes at Cell number on file:    Telephone Information:   Mobile [phone]

## 2023-12-30 NOTE — TELEPHONE ENCOUNTER
Pharmacy calling with questions about a prescription. Questions answered.     GUADALUPE SY RN    Reason for Disposition   [1] Prescription prescribed recently is not at pharmacy AND [2] triager has access to patient's EMR AND [3] prescription is recorded in the EMR    Additional Information   Negative: [1] Intentional drug overdose AND [2] suicidal thoughts or ideas   Negative: Drug overdose and triager unable to answer question   Negative: Caller requesting a renewal or refill of a medicine patient is currently taking   Negative: Caller requesting information unrelated to medicine   Negative: Caller requesting information about COVID-19 Vaccine   Negative: Caller requesting information about Emergency Contraception   Negative: Caller requesting information about Combined Birth Control Pills   Negative: Caller requesting information about Progestin Birth Control Pills   Negative: Caller requesting information about Post-Op pain or medicines   Negative: Caller requesting a prescription antibiotic (such as Penicillin) for Strep throat and has a positive culture result   Negative: Caller requesting a prescription anti-viral med (such as Tamiflu) and has influenza (flu) symptoms   Negative: Immunization reaction suspected   Negative: Rash while taking a medicine or within 3 days of stopping it   Negative: [1] Asthma and [2] having symptoms of asthma (cough, wheezing, etc.)   Negative: [1] Symptom of illness (e.g., headache, abdominal pain, earache, vomiting) AND [2] more than mild   Negative: Breastfeeding questions about mother's medicines and diet   Negative: MORE THAN A DOUBLE DOSE of a prescription or over-the-counter (OTC) drug   Negative: [1] DOUBLE DOSE (an extra dose or lesser amount) of prescription drug AND [2] any symptoms (e.g., dizziness, nausea, pain, sleepiness)   Negative: [1] DOUBLE DOSE (an extra dose or lesser amount) of over-the-counter (OTC) drug AND [2] any symptoms (e.g., dizziness,  nausea, pain, sleepiness)   Negative: Took another person's prescription drug   Negative: [1] DOUBLE DOSE (an extra dose or lesser amount) of prescription drug AND [2] NO symptoms  (Exception: A double dose of antibiotics.)   Negative: Diabetes drug error or overdose (e.g., took wrong type of insulin or took extra dose)   Negative: [1] Prescription not at pharmacy AND [2] was prescribed by PCP recently (Exception: Triager has access to EMR and prescription is recorded there. Go to Home Care and confirm for pharmacy.)   Negative: [1] Pharmacy calling with prescription question AND [2] triager unable to answer question   Negative: [1] Caller has URGENT medicine question about med that PCP or specialist prescribed AND [2] triager unable to answer question   Negative: Medicine patch causing local rash or itching   Negative: [1] Caller has medicine question about med NOT prescribed by PCP AND [2] triager unable to answer question (e.g., compatibility with other med, storage)   Negative: Prescription request for new medicine (not a refill)   Negative: [1] Caller has NON-URGENT medicine question about med that PCP prescribed AND [2] triager unable to answer question   Negative: Caller wants to use a complementary or alternative medicine    Protocols used: Medication Question Call-A-

## 2024-01-02 ENCOUNTER — VIRTUAL VISIT (OUTPATIENT)
Dept: PHARMACY | Facility: CLINIC | Age: 64
End: 2024-01-02
Payer: COMMERCIAL

## 2024-01-02 DIAGNOSIS — K59.00 CONSTIPATION, UNSPECIFIED CONSTIPATION TYPE: ICD-10-CM

## 2024-01-02 DIAGNOSIS — F32.A ANXIETY AND DEPRESSION: ICD-10-CM

## 2024-01-02 DIAGNOSIS — N95.2 ATROPHIC VAGINITIS: ICD-10-CM

## 2024-01-02 DIAGNOSIS — G25.81 RESTLESS LEG SYNDROME: Primary | ICD-10-CM

## 2024-01-02 DIAGNOSIS — F98.8 ATTENTION DEFICIT DISORDER (ADD) WITHOUT HYPERACTIVITY: ICD-10-CM

## 2024-01-02 DIAGNOSIS — Z78.9 TAKES DIETARY SUPPLEMENTS: ICD-10-CM

## 2024-01-02 DIAGNOSIS — F41.9 ANXIETY AND DEPRESSION: ICD-10-CM

## 2024-01-02 PROCEDURE — 99207 PR NO CHARGE LOS: CPT | Mod: 95 | Performed by: PHARMACIST

## 2024-01-02 NOTE — PROGRESS NOTES
Medication Therapy Management (MTM) Encounter    ASSESSMENT:                            Medication Adherence/Access: No issues identified    RLS: May benefit from discontinuing oral iron due to side effects. Patient to address at next visit with sleep medicine. Could also consider investigating if iron infusions will be covered by insurance with the new year.    ADHD/Anxiety: Stable.  Follows closely with psychiatry.     Atrophic Vaginitis: Stable.    Supplements: Stable    Constipation: Stable    PLAN:                            1. Could consider discontinuing oral iron.    Follow-up: 3 months    SUBJECTIVE/OBJECTIVE:                          Zari Francisco is a 63 year old female called for a follow up visit. She was referred to me from Susanna Dyer Follow up from 6/13/2023. First visit of 2024.    Reason for visit: RLS  Wants to get off of Iron and Vitamin C    Allergies/ADRs: Reviewed in chart  Past Medical History: Reviewed in chart  Tobacco: She reports that she has been smoking cigarettes. She has never used smokeless tobacco.  Alcohol: history of alcohol dependence; in recovery,  Caffeine: 1/2 caff 1/2 decaff; only in the am    Medication Adherence/Access: Per patient, misses medication 0 times per week.   Medication barriers: none.   The patient fills medications at [place]: NO, fills medications at [facility].  Sets alarm 11am for adderall, 430 for ropinirole and 815 for evening meds    RLS:  gabapentin 200mg at 1-2pm and 900mg at bedtime.  ropinirole 0.5mg during at 4:30pm and 0.5mg at 8:15pm (goes to bed at 10pm)  ferrous gluconate 324mg daily with breakfast.     Wants to get off of iron because it causes constipation.Added gabapentin during the day has really helped her legs. 900mg of gabapentin at bedtime works great. May wake up around 4:30am but can usually go back to sleep.  Patient is following with sleep clinic. Has a follow up on the 18th. Is down to 0.5mg at night and has actually  skipped it a couple of nights. Would like to get off of the ropinirole.  Iron infusions were denied due to iron infusions not having FDA approval for RLS. Is having constipation with the ferrous gluconate.  Is eating All-bran.  Has been taking Miralax 1 capful 5 times a week. If she takes daily, she has loose stools. Does report that she drinks plenty of water.     Lab Results   Component Value Date    ALBERT 46 02/16/2023     ADHD/Anxiety:  adderall 10mg daily as needed-doesn't always take on the weekends  escitalopram 10mg daily at night  Magnesium glycinate 400mg at bedime    Has been doing DBT for a year and going to be graduating from the program.    Follows with Rani Valentine.        9/29/2022     7:49 AM 11/9/2022    10:12 AM 6/5/2023     3:32 PM   PUSHPA-7 SCORE   Total Score  9 (mild anxiety) 9 (mild anxiety)   Total Score 7 9 9    9     Atrophic Vaginitis:   premarin cream 0.5grams twice a week as directed.     Usually applying once a week and this is effective.     Supplements:   Fish Oil 2400mg daily    No concerns    Constipation:  Miralax 1 capful daily as needed. Hasn't taken for a couple of weeks because she has to switch up  what she uses. Right now she is eating Raisin Bran, All Bran and Kashi.    Has issues with bowel movements regardless of taking iron. Eats her fruits and vegetables.    Today's Vitals: There were no vitals taken for this visit.  ----------------    I spent 24minutes with this patient today. All changes were made via collaborative practice agreement with Susanna Dyer A copy of the visit note was provided to the patient's provider(s).    A summary of these recommendations was sent via [employer].    Nay Francois, Pharm.D, [facility]  Medication Therapy Management Pharmacist    Telemedicine Visit Details  Type of service:  Video Visit  Start Time: 8:35AM  End Time: 8:59AM     Medication Therapy Recommendations  Restless leg syndrome    Current Medication: ferrous gluconate (FERGON)  324 (38 Fe) MG tablet   Rationale: Undesirable effect - Adverse medication event - Safety   Recommendation: Discontinue Medication   Status: Contact Provider - Awaiting Response

## 2024-01-02 NOTE — PATIENT INSTRUCTIONS
"Recommendations from today's MTM visit:                                                         1. Could consider discontinuing oral iron.    Follow-up: 3 monhts    It was great speaking with you today.  I value your experience and would be very thankful for your time in providing feedback in our clinic survey. In the next few days, you may receive an email or text message from [facility] [employer] with a link to a survey related to your  clinical pharmacist.\"     To schedule another MTM appointment, please call the clinic directly or you may call the MTM scheduling line at [phone] or toll-free at [phone].     My Clinical Pharmacist's contact information:                                                      Please feel free to contact me with any questions or concerns you have.      Nay Francois, Pharm.D, [facility]  Medication Therapy Management Pharmacist      "

## 2024-01-09 ASSESSMENT — SLEEP AND FATIGUE QUESTIONNAIRES
HOW LIKELY ARE YOU TO NOD OFF OR FALL ASLEEP WHILE SITTING QUIETLY AFTER LUNCH WITHOUT ALCOHOL: SLIGHT CHANCE OF DOZING
HOW LIKELY ARE YOU TO NOD OFF OR FALL ASLEEP IN A CAR, WHILE STOPPED FOR A FEW MINUTES IN TRAFFIC: WOULD NEVER DOZE
HOW LIKELY ARE YOU TO NOD OFF OR FALL ASLEEP WHILE SITTING AND READING: MODERATE CHANCE OF DOZING
HOW LIKELY ARE YOU TO NOD OFF OR FALL ASLEEP WHEN YOU ARE A PASSENGER IN A CAR FOR AN HOUR WITHOUT A BREAK: SLIGHT CHANCE OF DOZING
HOW LIKELY ARE YOU TO NOD OFF OR FALL ASLEEP WHILE LYING DOWN TO REST IN THE AFTERNOON WHEN CIRCUMSTANCES PERMIT: SLIGHT CHANCE OF DOZING
HOW LIKELY ARE YOU TO NOD OFF OR FALL ASLEEP WHILE SITTING INACTIVE IN A PUBLIC PLACE: WOULD NEVER DOZE
HOW LIKELY ARE YOU TO NOD OFF OR FALL ASLEEP WHILE WATCHING TV: MODERATE CHANCE OF DOZING
HOW LIKELY ARE YOU TO NOD OFF OR FALL ASLEEP WHILE SITTING AND TALKING TO SOMEONE: WOULD NEVER DOZE

## 2024-01-10 ENCOUNTER — ANESTHESIA EVENT (OUTPATIENT)
Dept: SURGERY | Facility: AMBULATORY SURGERY CENTER | Age: 64
End: 2024-01-10
Payer: COMMERCIAL

## 2024-01-10 NOTE — ANESTHESIA PREPROCEDURE EVALUATION
Anesthesia Pre-Procedure Evaluation    Patient: Zari Francisco   MRN: [id number] : 1960        Procedure : Procedure(s):  Colonoscopy with CO2 insufflation          Past Medical History:   Diagnosis Date    Anxiety     Bell palsy     Cervical high risk HPV (human papillomavirus) test positive 2019    See problem list    DDD (degenerative disc disease), lumbar 2023    S/P ROBERT-BSO     still has cervix    Seasonal allergies       Past Surgical History:   Procedure Laterality Date    C LAPAROSCOPIC SUPRACERVICAL HYSTERECTOMY (SUBTOTAL HYSTERECTOMY), WITH OR      for DUB, with ovaries    CL AFF SURGICAL PATHOLOGY      nodules removal on vocal cords    COLONOSCOPY  2011    Procedure:COLONOSCOPY; Colonoscopy, screening; Surgeon:NAEL NICOLAS; Location:[facility] OR    COLONOSCOPY  2013    Procedure: COLONOSCOPY;  colonoscopy screening;  Surgeon: Roni Chambers MD;  Location: [facility] OR    COSMETIC SURGERY      ENT SURGERY      HAND SURGERY Right 2018    Carpal Tunnel Surgery, Right Wrist    HYSTERECTOMY, PAP NO LONGER INDICATED      Has cervix    ORTHOPEDIC SURGERY      SURGICAL HISTORY OF -       left  shoulder surgery    SURGICAL HISTORY OF -       LEFT WRIST SURGERY      Allergies   Allergen Reactions    Metal [Staples]     Sudafed [Pseudoephedrine Hcl]     Sympathomimetics      Clariten D    Contrast Dye Itching     Isovue 370-CT contrast. Very small area of itchiness after contrast injection      Social History     Tobacco Use    Smoking status: Some Days     Years: 10     Types: Cigarettes    Smokeless tobacco: Never    Tobacco comments:     3-4 cigaraettes/day   Substance Use Topics    Alcohol use: No     Comment: sober since       Wt Readings from Last 1 Encounters:   23 63.5 kg (140 lb)           Physical Exam    Airway         TM distance: > 3 FB   Neck ROM: full   Mouth opening: > 3 cm    Respiratory Devices and Support         Dental       (+) Minor Abnormalities - some  "fillings, tiny chips      Cardiovascular   cardiovascular exam normal          Pulmonary   pulmonary exam normal                OUTSIDE LABS:  CBC:   Lab Results   Component Value Date    WBC 11.4 (H) 02/16/2023    WBC 8.0 10/04/2021    HGB 14.3 02/16/2023    HGB 12.8 10/04/2021    HCT 43.7 02/16/2023    HCT 38.2 10/04/2021     02/16/2023     10/04/2021     BMP:   Lab Results   Component Value Date     02/16/2023     10/04/2021    POTASSIUM 4.2 02/16/2023    POTASSIUM 3.5 10/04/2021    CHLORIDE 105 02/16/2023    CHLORIDE 107 10/04/2021    CO2 29 02/16/2023    CO2 31 10/04/2021    BUN 18 02/16/2023    BUN 25 10/04/2021    CR 0.69 02/16/2023    CR 0.87 10/04/2021    GLC 95 02/16/2023     (H) 10/04/2021     COAGS: No results found for: \"PTT\", \"INR\", \"FIBR\"  POC: No results found for: \"BGM\", \"HCG\", \"HCGS\"  HEPATIC:   Lab Results   Component Value Date    ALBUMIN 3.7 02/16/2023    PROTTOTAL 6.8 02/16/2023    ALT 26 02/16/2023    AST 13 02/16/2023    ALKPHOS 87 02/16/2023    BILITOTAL 0.3 02/16/2023     OTHER:   Lab Results   Component Value Date    A1C 5.6 10/04/2021    MIKI 9.2 02/16/2023    MAG 2.3 10/22/2018    TSH 1.17 10/04/2021    T4 1.28 11/07/2002       Anesthesia Plan    ASA Status:  2    NPO Status:  NPO Appropriate    Anesthesia Type: MAC.     - Reason for MAC: straight local not clinically adequate   Induction: Intravenous, Propofol.   Maintenance: TIVA.        Consents    Anesthesia Plan(s) and associated risks, benefits, and realistic alternatives discussed. Questions answered and patient/representative(s) expressed understanding.     - Discussed:     - Discussed with:  Patient      - Extended Intubation/Ventilatory Support Discussed: No.      - Patient is DNR/DNI Status: No     Use of blood products discussed: No .     Postoperative Care       PONV prophylaxis: Ondansetron (or other 5HT-3), Background Propofol Infusion     Comments:               Slick Foster MD    I have " reviewed the pertinent notes and labs in the chart from the past 30 days and (re)examined the patient.  Any updates or changes from those notes are reflected in this note.

## 2024-01-11 ENCOUNTER — HOSPITAL ENCOUNTER (OUTPATIENT)
Facility: AMBULATORY SURGERY CENTER | Age: 64
Discharge: HOME | End: 2024-01-11
Attending: INTERNAL MEDICINE
Payer: COMMERCIAL

## 2024-01-11 ENCOUNTER — ANESTHESIA (OUTPATIENT)
Dept: SURGERY | Facility: AMBULATORY SURGERY CENTER | Age: 64
End: 2024-01-11
Payer: COMMERCIAL

## 2024-01-11 VITALS
OXYGEN SATURATION: 95 % | TEMPERATURE: 97.3 F | RESPIRATION RATE: 16 BRPM | DIASTOLIC BLOOD PRESSURE: 89 MMHG | SYSTOLIC BLOOD PRESSURE: 130 MMHG

## 2024-01-11 VITALS — HEART RATE: 67 BPM

## 2024-01-11 LAB — COLONOSCOPY: NORMAL

## 2024-01-11 PROCEDURE — 88305 TISSUE EXAM BY PATHOLOGIST: CPT | Performed by: PATHOLOGY

## 2024-01-11 PROCEDURE — G8907 PT DOC NO EVENTS ON DISCHARG: HCPCS

## 2024-01-11 PROCEDURE — G8918 PT W/O PREOP ORDER IV AB PRO: HCPCS

## 2024-01-11 PROCEDURE — 45380 COLONOSCOPY AND BIOPSY: CPT

## 2024-01-11 RX ORDER — LIDOCAINE 40 MG/G
CREAM TOPICAL
Status: DISCONTINUED | OUTPATIENT
Start: 2024-01-11 | End: 2024-01-12 | Stop reason: HOSPADM

## 2024-01-11 RX ORDER — FLUMAZENIL 0.1 MG/ML
0.2 INJECTION, SOLUTION INTRAVENOUS
Status: ACTIVE | OUTPATIENT
Start: 2024-01-11 | End: 2024-01-11

## 2024-01-11 RX ORDER — NALOXONE HYDROCHLORIDE 0.4 MG/ML
0.4 INJECTION, SOLUTION INTRAMUSCULAR; INTRAVENOUS; SUBCUTANEOUS
Status: DISCONTINUED | OUTPATIENT
Start: 2024-01-11 | End: 2024-01-12 | Stop reason: HOSPADM

## 2024-01-11 RX ORDER — NALOXONE HYDROCHLORIDE 0.4 MG/ML
0.2 INJECTION, SOLUTION INTRAMUSCULAR; INTRAVENOUS; SUBCUTANEOUS
Status: DISCONTINUED | OUTPATIENT
Start: 2024-01-11 | End: 2024-01-12 | Stop reason: HOSPADM

## 2024-01-11 RX ORDER — ONDANSETRON 4 MG/1
4 TABLET, ORALLY DISINTEGRATING ORAL EVERY 6 HOURS PRN
Status: DISCONTINUED | OUTPATIENT
Start: 2024-01-11 | End: 2024-01-12 | Stop reason: HOSPADM

## 2024-01-11 RX ORDER — ONDANSETRON 2 MG/ML
4 INJECTION INTRAMUSCULAR; INTRAVENOUS
Status: DISCONTINUED | OUTPATIENT
Start: 2024-01-11 | End: 2024-01-12 | Stop reason: HOSPADM

## 2024-01-11 RX ORDER — PROCHLORPERAZINE MALEATE 10 MG
10 TABLET ORAL EVERY 6 HOURS PRN
Status: DISCONTINUED | OUTPATIENT
Start: 2024-01-11 | End: 2024-01-12 | Stop reason: HOSPADM

## 2024-01-11 RX ORDER — LIDOCAINE HYDROCHLORIDE 20 MG/ML
INJECTION, SOLUTION INFILTRATION; PERINEURAL PRN
Status: DISCONTINUED | OUTPATIENT
Start: 2024-01-11 | End: 2024-01-11

## 2024-01-11 RX ORDER — ONDANSETRON 2 MG/ML
4 INJECTION INTRAMUSCULAR; INTRAVENOUS EVERY 6 HOURS PRN
Status: DISCONTINUED | OUTPATIENT
Start: 2024-01-11 | End: 2024-01-12 | Stop reason: HOSPADM

## 2024-01-11 RX ORDER — PROPOFOL 10 MG/ML
INJECTION, EMULSION INTRAVENOUS CONTINUOUS PRN
Status: DISCONTINUED | OUTPATIENT
Start: 2024-01-11 | End: 2024-01-11

## 2024-01-11 RX ORDER — SODIUM CHLORIDE, SODIUM LACTATE, POTASSIUM CHLORIDE, CALCIUM CHLORIDE 600; 310; 30; 20 MG/100ML; MG/100ML; MG/100ML; MG/100ML
INJECTION, SOLUTION INTRAVENOUS CONTINUOUS PRN
Status: DISCONTINUED | OUTPATIENT
Start: 2024-01-11 | End: 2024-01-11

## 2024-01-11 RX ADMIN — PROPOFOL 30 MG: 10 INJECTION, EMULSION INTRAVENOUS at 08:16

## 2024-01-11 RX ADMIN — LIDOCAINE HYDROCHLORIDE 60 MG: 20 INJECTION, SOLUTION INFILTRATION; PERINEURAL at 07:48

## 2024-01-11 RX ADMIN — SODIUM CHLORIDE, SODIUM LACTATE, POTASSIUM CHLORIDE, CALCIUM CHLORIDE: 600; 310; 30; 20 INJECTION, SOLUTION INTRAVENOUS at 07:24

## 2024-01-11 RX ADMIN — PROPOFOL 30 MG: 10 INJECTION, EMULSION INTRAVENOUS at 07:58

## 2024-01-11 RX ADMIN — PROPOFOL 150 MCG/KG/MIN: 10 INJECTION, EMULSION INTRAVENOUS at 07:48

## 2024-01-11 RX ADMIN — PROPOFOL 70 MG: 10 INJECTION, EMULSION INTRAVENOUS at 07:49
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
## 2024-01-11 NOTE — ANESTHESIA CARE TRANSFER NOTE
Patient: Zari Francisco    Procedure: Procedure(s):  Colonoscopy with CO2 insufflation  COLONOSCOPY, WITH POLYPECTOMY AND BIOPSY       Diagnosis: Screen for colon cancer [Z12.11]  Diagnosis Additional Information: No value filed.    Anesthesia Type:   MAC     Note:    Oropharynx: oropharynx clear of all foreign objects and spontaneously breathing  Level of Consciousness: drowsy  Oxygen Supplementation: room air    Independent Airway: airway patency satisfactory and stable  Dentition: dentition unchanged  Vital Signs Stable: post-procedure vital signs reviewed and stable  Report to RN Given: handoff report given  Patient transferred to: Phase II    Handoff Report: Identifed the Patient, Identified the Reponsible Provider, Reviewed the pertinent medical history, Discussed the surgical course, Reviewed Intra-OP anesthesia mangement and issues during anesthesia, Set expectations for post-procedure period and Allowed opportunity for questions and acknowledgement of understanding      Vitals:  Vitals Value Taken Time   BP     Temp     Pulse 67 01/11/24 0825   Resp     SpO2         Electronically Signed By: CARLOS Bustillo CRNA  January 11, 2024  8:33 AM   normal...

## 2024-01-11 NOTE — ADDENDUM NOTE
Addendum  created 01/11/24 0912 by Slick Foster MD    Attestation recorded in Intraprocedure, Intraprocedure Attestations filed

## 2024-01-15 LAB
PATH REPORT.COMMENTS IMP SPEC: NORMAL
PATH REPORT.COMMENTS IMP SPEC: NORMAL
PATH REPORT.FINAL DX SPEC: NORMAL
PATH REPORT.GROSS SPEC: NORMAL
PATH REPORT.MICROSCOPIC SPEC OTHER STN: NORMAL
PATH REPORT.RELEVANT HX SPEC: NORMAL
PHOTO IMAGE: NORMAL

## 2024-01-16 ENCOUNTER — VIRTUAL VISIT (OUTPATIENT)
Dept: SLEEP MEDICINE | Facility: CLINIC | Age: 64
End: 2024-01-16
Payer: COMMERCIAL

## 2024-01-16 VITALS — HEIGHT: 65 IN | BODY MASS INDEX: 22.49 KG/M2 | WEIGHT: 135 LBS

## 2024-01-16 DIAGNOSIS — G25.81 RESTLESS LEG SYNDROME: ICD-10-CM

## 2024-01-16 PROCEDURE — 99214 OFFICE O/P EST MOD 30 MIN: CPT | Mod: 95 | Performed by: PHYSICIAN ASSISTANT

## 2024-01-16 RX ORDER — GABAPENTIN 100 MG/1
CAPSULE ORAL
Qty: 180 CAPSULE | Refills: 0 | Status: SHIPPED | OUTPATIENT
Start: 2024-01-16 | End: 2024-04-09

## 2024-01-16 RX ORDER — GABAPENTIN 300 MG/1
CAPSULE ORAL
Qty: 270 CAPSULE | Refills: 0 | Status: SHIPPED | OUTPATIENT
Start: 2024-01-16 | End: 2024-05-02

## 2024-01-16 ASSESSMENT — PAIN SCALES - GENERAL: PAINLEVEL: EXTREME PAIN (8)

## 2024-01-16 NOTE — NURSING NOTE
Is the patient currently in the state of [place]? YES    Visit mode:VIDEO    If the visit is dropped, the patient can be reconnected by: VIDEO VISIT: Text to cell phone:   Telephone Information:   Mobile [phone]       Will anyone else be joining the visit? NO  (If patient encounters technical issues they should call [phone]    How would you like to obtain your AVS? MyChart    Are changes needed to the allergy or medication list? No    Reason for visit: RECHECK    Tal MAYFIELD
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
## 2024-01-17 ENCOUNTER — TRANSFERRED RECORDS (OUTPATIENT)
Dept: HEALTH INFORMATION MANAGEMENT | Facility: CLINIC | Age: 64
End: 2024-01-17
Payer: COMMERCIAL

## 2024-01-18 ENCOUNTER — ANCILLARY PROCEDURE (OUTPATIENT)
Dept: MAMMOGRAPHY | Facility: CLINIC | Age: 64
End: 2024-01-18
Attending: FAMILY MEDICINE
Payer: COMMERCIAL

## 2024-01-18 DIAGNOSIS — Z12.31 VISIT FOR SCREENING MAMMOGRAM: ICD-10-CM

## 2024-01-18 PROCEDURE — 77063 BREAST TOMOSYNTHESIS BI: CPT | Mod: GC | Performed by: STUDENT IN AN ORGANIZED HEALTH CARE EDUCATION/TRAINING PROGRAM

## 2024-01-18 PROCEDURE — 77067 SCR MAMMO BI INCL CAD: CPT | Mod: GC | Performed by: STUDENT IN AN ORGANIZED HEALTH CARE EDUCATION/TRAINING PROGRAM

## 2024-01-18 ASSESSMENT — PATIENT HEALTH QUESTIONNAIRE - PHQ9
SUM OF ALL RESPONSES TO PHQ QUESTIONS 1-9: 0
10. IF YOU CHECKED OFF ANY PROBLEMS, HOW DIFFICULT HAVE THESE PROBLEMS MADE IT FOR YOU TO DO YOUR WORK, TAKE CARE OF THINGS AT HOME, OR GET ALONG WITH OTHER PEOPLE: NOT DIFFICULT AT ALL
SUM OF ALL RESPONSES TO PHQ QUESTIONS 1-9: 0

## 2024-01-19 ENCOUNTER — VIRTUAL VISIT (OUTPATIENT)
Dept: BEHAVIORAL HEALTH | Facility: CLINIC | Age: 64
End: 2024-01-19
Payer: COMMERCIAL

## 2024-01-19 ENCOUNTER — VIRTUAL VISIT (OUTPATIENT)
Dept: PSYCHIATRY | Facility: CLINIC | Age: 64
End: 2024-01-19
Payer: COMMERCIAL

## 2024-01-19 DIAGNOSIS — F10.21 HISTORY OF ALCOHOL DEPENDENCE (H): ICD-10-CM

## 2024-01-19 DIAGNOSIS — F41.1 GENERALIZED ANXIETY DISORDER: ICD-10-CM

## 2024-01-19 DIAGNOSIS — F41.1 GENERALIZED ANXIETY DISORDER: Primary | ICD-10-CM

## 2024-01-19 DIAGNOSIS — Z86.59 HX OF MAJOR DEPRESSION: ICD-10-CM

## 2024-01-19 DIAGNOSIS — F10.11 ALCOHOL ABUSE, IN REMISSION: ICD-10-CM

## 2024-01-19 DIAGNOSIS — Z86.59 HISTORY OF MAJOR DEPRESSION: ICD-10-CM

## 2024-01-19 DIAGNOSIS — F98.8 ATTENTION DEFICIT DISORDER (ADD) WITHOUT HYPERACTIVITY: Primary | ICD-10-CM

## 2024-01-19 DIAGNOSIS — F98.8 ATTENTION DEFICIT DISORDER (ADD) WITHOUT HYPERACTIVITY: ICD-10-CM

## 2024-01-19 PROCEDURE — 99214 OFFICE O/P EST MOD 30 MIN: CPT | Mod: 95 | Performed by: PSYCHIATRY & NEUROLOGY

## 2024-01-19 PROCEDURE — 90791 PSYCH DIAGNOSTIC EVALUATION: CPT | Mod: 95 | Performed by: COUNSELOR

## 2024-01-19 RX ORDER — ESCITALOPRAM OXALATE 5 MG/1
5 TABLET ORAL DAILY
Qty: 30 TABLET | Refills: 1 | Status: SHIPPED | OUTPATIENT
Start: 2024-01-19 | End: 2024-04-26

## 2024-01-19 RX ORDER — DEXTROAMPHETAMINE SACCHARATE, AMPHETAMINE ASPARTATE, DEXTROAMPHETAMINE SULFATE AND AMPHETAMINE SULFATE 2.5; 2.5; 2.5; 2.5 MG/1; MG/1; MG/1; MG/1
10 TABLET ORAL DAILY
Qty: 30 TABLET | Refills: 0 | Status: SHIPPED | OUTPATIENT
Start: 2024-01-19 | End: 2024-02-18

## 2024-01-19 RX ORDER — DEXTROAMPHETAMINE SACCHARATE, AMPHETAMINE ASPARTATE, DEXTROAMPHETAMINE SULFATE AND AMPHETAMINE SULFATE 2.5; 2.5; 2.5; 2.5 MG/1; MG/1; MG/1; MG/1
10 TABLET ORAL DAILY
Qty: 30 TABLET | Refills: 0 | Status: SHIPPED | OUTPATIENT
Start: 2024-02-19 | End: 2024-03-20

## 2024-01-19 RX ORDER — DEXTROAMPHETAMINE SACCHARATE, AMPHETAMINE ASPARTATE, DEXTROAMPHETAMINE SULFATE AND AMPHETAMINE SULFATE 2.5; 2.5; 2.5; 2.5 MG/1; MG/1; MG/1; MG/1
10 TABLET ORAL DAILY
Qty: 30 TABLET | Refills: 0 | Status: SHIPPED | OUTPATIENT
Start: 2024-03-21 | End: 2024-04-20

## 2024-01-19 ASSESSMENT — COLUMBIA-SUICIDE SEVERITY RATING SCALE - C-SSRS
TOTAL  NUMBER OF ABORTED OR SELF INTERRUPTED ATTEMPTS LIFETIME: NO
6. HAVE YOU EVER DONE ANYTHING, STARTED TO DO ANYTHING, OR PREPARED TO DO ANYTHING TO END YOUR LIFE?: NO
TOTAL  NUMBER OF INTERRUPTED ATTEMPTS LIFETIME: NO
ATTEMPT LIFETIME: NO
1. HAVE YOU WISHED YOU WERE DEAD OR WISHED YOU COULD GO TO SLEEP AND NOT WAKE UP?: NO
2. HAVE YOU ACTUALLY HAD ANY THOUGHTS OF KILLING YOURSELF?: NO

## 2024-01-19 NOTE — NURSING NOTE
Is the patient currently in the state of [place]? YES    Visit mode:VIDEO    If the visit is dropped, the patient can be reconnected by: VIDEO VISIT: Send to e-mail at: [email or web address]    Will anyone else be joining the visit? NO  (If patient encounters technical issues they should call [phone])    How would you like to obtain your AVS? MyChart    Are changes needed to the allergy or medication list? No    Reason for visit: JOSE MAYFIELD
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
## 2024-01-19 NOTE — PROGRESS NOTES
"    [facility] [place]      PATIENT'S NAME: Zari Francisco  PREFERRED NAME: Zari  PRONOUNS: she/her/hers     MRN: [id number]  : 1960  ADDRESS: [address] [place] [address]  ACCT. NUMBER:  [id number]  DATE OF SERVICE: 24  START TIME: 0730AM  END TIME: 755AM  PREFERRED PHONE: [phone]  May we leave a program related message: Yes  EMERGENCY CONTACT: was obtained Luis Francisco (Spouse)   [phone] (Work Phone)   SERVICE MODALITY:  Video Visit:      Provider verified identity through the following two step process.  Patient provided:  Patient  and Patient address    Telemedicine Visit: The patient's condition can be safely assessed and treated via synchronous audio and visual telemedicine encounter.      Reason for Telemedicine Visit: Services only offered telehealth    Originating Site (Patient Location): Patient's home    Distant Site (Provider Location): [facility] MENTAL [facility] & ADDICTION [facility]    Consent:  The patient/guardian has verbally consented to: the potential risks and benefits of telemedicine (video visit) versus in person care; bill my insurance or make self-payment for services provided; and responsibility for payment of non-covered services.     Patient would like the video invitation sent by:  My Chart    Mode of Communication:  Video Conference via Amwell    Distant Location (Provider):  On-site    As the provider I attest to compliance with applicable laws and regulations related to telemedicine.    UNIVERSAL ADULT Mental Health DIAGNOSTIC ASSESSMENT    Identifying Information:  Patient is a 63 year old,   individual.  Patient was referred for an assessment by self.  Patient attended the session alone.    Chief Complaint:   The reason for seeking services at this time is: \"Anxiety\".  The problem(s) began 89.    Patient has attempted to resolve these concerns in the past through previous med mgmt and therapy " ".    Social/Family History:  Patient reported they grew up in other [place].  They were raised by biological parents  .  Parents were always together. Has 2 younger sisters and a younger brother. Patient reported that their childhood was \"fine. We were poor but it was fine\". Dropped out of school at 14, smoking pot and drinking. Patient described their current relationships with family of origin as good with her mother; father is . She maintains a good relationship with her sisters; brother has isolated from the family.      The patient describes their cultural background as .  Cultural influences and impact on patient's life structure, values, norms, and healthcare: N/A.  Contextual influences on patient's health include: N/a.    These factors will be addressed in the Preliminary Treatment plan. Patient identified their preferred language to be English. Patient reported they does not need the assistance of an  or other support involved in therapy.     Patient reported had no significant delays in developmental tasks.   Patient's highest education level was associate degree / vocational certificate  .  Patient identified the following learning problems: none reported.  Modifications will not be used to assist communication in therapy.  Patient reports they are  able to understand written materials.    Patient reported the following relationship history .  Patient's current relationship status is  for 42 years.   Patient identified their sexual orientation as heterosexual.  Patient reported having 2 child(yenifer). Patient identified partner; mother; siblings; friends as part of their support system.  Patient identified the quality of these relationships as stable and meaningful,  .      Patient's current living/housing situation involves staying in own home/apartment.  The immediate members of family and household include Suhas Francisco, 67,Spouse  and they report that housing is " stable.    Patient is currently employed part time.  Patient reports their finances are obtained through employment. Patient does not identify finances as a current stressor.  ASL interpter.     Patient reported that they have not been involved with the legal system.    . Patient does not report being under probation/ parole/ jurisdiction. .    Patient's Strengths and Limitations:  Patient identified the following strengths or resources that will help them succeed in treatment: friends / good social support, family support, insight, intelligence, sense of humor, sober support group / recovery support , and work ethic. Things that may interfere with the patient's success in treatment include: physical health concerns.     Assessments:  The following assessments were completed by patient for this visit:  PHQ2:       1/2/2024     8:55 AM 9/26/2023    10:21 AM 2/17/2023     6:48 AM 9/13/2022     8:36 AM 10/19/2021     9:06 AM 5/13/2020     8:19 AM 5/3/2019     8:43 AM   PHQ-2 ( 1999 [employer])   Q1: Little interest or pleasure in doing things 0 0 0 0 1 0 0   Q2: Feeling down, depressed or hopeless 0 0 0 0 0 0 0   PHQ-2 Score 0 0 0 0 1 0 0   PHQ-2 Total Score (12-17 Years)- Positive if 3 or more points; Administer PHQ-A if positive     1 0 0   Q1: Little interest or pleasure in doing things   Not at all  Several days  Not at all   Q2: Feeling down, depressed or hopeless   Not at all  Not at all  Not at all   PHQ-2 Score   0  1  0     PHQ9:       5/19/2022    10:58 AM 9/29/2022     7:28 AM 11/9/2022    10:10 AM 2/17/2023     6:45 AM 6/7/2023    10:59 AM 7/13/2023     5:56 AM 1/18/2024     4:46 PM   PHQ-9 SCORE   PHQ-9 Total Score MyChart 2 (Minimal depression) 7 (Mild depression) 11 (Moderate depression) 3 (Minimal depression) 3 (Minimal depression) 4 (Minimal depression) 0   PHQ-9 Total Score 2    2 7 11 3 3 4 0     GAD2:       9/22/2022    12:04 PM 11/9/2022    10:12 AM 1/5/2023     7:44 AM 6/5/2023     3:32 PM 7/6/2023      9:10 AM 10/6/2023    10:15 AM 1/15/2024     4:31 PM   PUSHPA-2   Feeling nervous, anxious, or on edge 1 3 1 2 1 1 0   Not being able to stop or control worrying 1 0 1 1 1 1 0   PUSHPA-2 Total Score 2 3 2    2 3    3 2    2 2 0    0     GAD7:       12/20/2011     3:36 PM 11/8/2013     4:50 PM 2/25/2022     7:32 AM 9/29/2022     7:49 AM 11/9/2022    10:12 AM 6/5/2023     3:32 PM   PUSHPA-7 SCORE   Total Score 5 8       Total Score   6 (mild anxiety)  9 (mild anxiety) 9 (mild anxiety)   Total Score   6 7 9 9    9     CAGE-AID:       1/12/2022    12:44 PM   CAGE-AID Total Score   Total Score 0    0   Total Score MyChart 0 (A total score of 2 or greater is considered clinically significant)     PROMIS 10-Global Health (all questions and answers displayed):       1/13/2022    10:15 AM 5/14/2022     3:01 PM 9/22/2022    12:06 PM 1/5/2023     7:45 AM 6/5/2023     3:32 PM 10/6/2023    10:23 AM 1/15/2024     4:32 PM   PROMIS 10   In general, would you say your health is: Excellent Excellent Excellent Very good Excellent Excellent Excellent   In general, would you say your quality of life is: Excellent Excellent Excellent Excellent Excellent Excellent Excellent   In general, how would you rate your physical health? Very good Very good Excellent Good Excellent Excellent Very good   In general, how would you rate your mental health, including your mood and your ability to think? Good Good Good Good Fair Fair Very good   In general, how would you rate your satisfaction with your social activities and relationships? Excellent Excellent Excellent Excellent Excellent Excellent Excellent   In general, please rate how well you carry out your usual social activities and roles Very good Excellent Very good Very good Excellent Excellent Excellent   To what extent are you able to carry out your everyday physical activities such as walking, climbing stairs, carrying groceries, or moving a chair? Completely Completely Completely Mostly Completely  Completely Completely   In the past 7 days, how often have you been bothered by emotional problems such as feeling anxious, depressed, or irritable? Sometimes Rarely Sometimes Sometimes Sometimes Often Rarely   In the past 7 days, how would you rate your fatigue on average? Mild None Mild None Mild None None   In the past 7 days, how would you rate your pain on average, where 0 means no pain, and 10 means worst imaginable pain? 2 3 4 7 4 3 7   In general, would you say your health is: 5 5 5 4 5 5 5   In general, would you say your quality of life is: 5 5 5 5 5 5 5   In general, how would you rate your physical health? 4 4 5 3 5 5 4   In general, how would you rate your mental health, including your mood and your ability to think? 3 3 3 3 2 2 4   In general, how would you rate your satisfaction with your social activities and relationships? 5 5 5 5 5 5 5   In general, please rate how well you carry out your usual social activities and roles. (This includes activities at home, at work and in your community, and responsibilities as a parent, child, spouse, employee, friend, etc.) 4 5 4 4 5 5 5   To what extent are you able to carry out your everyday physical activities such as walking, climbing stairs, carrying groceries, or moving a chair? 5 5 5 4 5 5 5   In the past 7 days, how often have you been bothered by emotional problems such as feeling anxious, depressed, or irritable? 3 2 3 3 3 4 2   In the past 7 days, how would you rate your fatigue on average? 2 1 2 1 2 1 1   In the past 7 days, how would you rate your pain on average, where 0 means no pain, and 10 means worst imaginable pain? 2 3 4 7 4 3 7   Global Mental Health Score 16 17    17 16 16    16 15    15 14 18    18   Global Physical Health Score 17 18    18 17 14    14 17    17 19 16    16   PROMIS TOTAL - SUBSCORES 33 35    35 33 30    30 32    32 33 34    34     [place] Suicide Severity Rating Scale (Lifetime/Recent)      1/17/2022     9:44 AM 1/19/2024      7:49 AM   [place] Suicide Severity Rating (Lifetime/Recent)   Q1 Wish to be Dead (Lifetime) No    Q2 Non-Specific Active Suicidal Thoughts (Lifetime) No    RETIRED: 1. Wish to be Dead (Recent) No    RETIRED: 2. Non-Specific Active Suicidal Thoughts (Recent) No    3. Active Suicidal Ideation with any Methods (Not Plan) Without Intent to Act (Lifetime) No    RETIRED: 3. Active Suicidal Ideation with any Methods (Not Plan) Without Intent to Act (Recent) No    RETIRE: 4. Active Suicidal Ideation with Some Intent to Act, Without Specific Plan (Lifetime) No    4. Active Suicidal Ideation with Some Intent to Act, Without Specific Plan (Recent) No    RETIRE: 5. Active Suicidal Ideation with Specific Plan and Intent (Lifetime) No    RETIRED: 5. Active Suicidal Ideation with Specific Plan and Intent (Recent) No    Actual Attempt (Lifetime) No    Actual Attempt (Past 3 Months) No    Has subject engaged in non-suicidal self-injurious behavior? (Lifetime) No    Has subject engaged in non-suicidal self-injurious behavior? (Past 3 Months) No    Q1 Wish to be Dead (Lifetime)  N   Q2 Non-Specific Active Suicidal Thoughts (Lifetime)  N   Actual Attempt (Lifetime)  N   Has subject engaged in non-suicidal self-injurious behavior? (Lifetime)  N   Interrupted Attempts (Lifetime)  N   Aborted or Self-Interrupted Attempt (Lifetime)  N   Preparatory Acts or Behavior (Lifetime)  N   Calculated C-SSRS Risk Score (Lifetime/Recent)  No Risk Indicated       Personal and Family Medical History:  Patient does report a family history of mental health concerns.  Patient reports family history includes Alcohol/Drug in an other family member; Allergies in her mother; Arthritis in her mother and sister; Breast Cancer in her maternal grandmother; Cancer in her father, maternal aunt, and maternal grandmother; Heart Disease in her paternal grandfather; Lipids in her father; Obesity in her mother; Other Cancer in her father..     Patient does report  Mental Health Diagnosis and/or Treatment.  Patient reported the following previous diagnoses which include(s): ADHD; an anxiety disorder .  Patient reported symptoms began years ago.  Patient has received mental health services in the past:  therapy  .  Psychiatric Hospitalizations: none     Currently, patient psychotherapy  is receiving other mental health services.  These include psychotherapy with [facility] .       Patient has had a physical exam to rule out medical causes for current symptoms.  Date of last physical exam was within the past year. Client was encouraged to follow up with PCP if symptoms were to develop. The patient has a [place] Primary Care Provider, who is named Susanna Dyer.  Patient reports no current medical concerns.  Patient reports pain concerns including wrist pain, low pain.  Patient does want help addressing pain concerns..   There are not significant appetite / nutritional concerns / weight changes.   Patient does not report a history of head injury / trauma / cognitive impairment.      Patient reports current meds as:   Outpatient Medications Marked as Taking for the 1/19/24 encounter (Appointment) with Cher By[facility]   Medication Sig    escitalopram (LEXAPRO) 10 MG tablet Take 1 tablet (10 mg) by mouth daily    gabapentin (NEURONTIN) 100 MG capsule Take 2 caps at 12 pm    gabapentin (NEURONTIN) 300 MG capsule Take 3 tablets at bedtime   Gabapentin for RLS    Medication Adherence:  Patient reports taking.  taking prescribed medications as prescribed.    Patient Allergies:    Allergies   Allergen Reactions    Metal [Staples]     Sudafed [Pseudoephedrine Hcl]     Sympathomimetics      Clariten D    Contrast Dye Itching     Isovue 370-CT contrast. Very small area of itchiness after contrast injection       Medical History:    Past Medical History:   Diagnosis Date    Anxiety     Bell palsy     Cervical high risk HPV (human papillomavirus) test positive 05/06/2019    See  problem list    DDD (degenerative disc disease), lumbar 1/5/2023    S/P MARYLIN     still has cervix    Seasonal allergies          Current Mental Status Exam:   Appearance:  Appropriate    Eye Contact:  Good   Psychomotor:  Normal       Gait / station:  no problem  Attitude / Demeanor: Cooperative   Speech      Rate / Production: Normal/ Responsive      Volume:  Normal  volume      Language:  intact  Mood:   Anxious   Affect:   Appropriate    Thought Content: Clear   Thought Process: Coherent  Goal Directed       Associations: No loosening of associations  Insight:   Good   Judgment:  Intact   Orientation:  Person Place Time Situation All  Attention/concentration: Good    Substance Use:   Patient did report a family history of substance use concerns; see medical history section for details.  Patient has received chemical dependency treatment in the past at [facility] .  Patient has ever been to detox.      Patient is not currently receiving any chemical dependency treatment.           Substance History of use Age of first use Date of last use     Pattern and duration of use (include amounts and frequency)   Alcohol used in the past   15 05/10/91 32 years sober   Cannabis   currently use 15 12/31/23 socially     Amphetamines   never used     REPORTS SUBSTANCE USE: N/A   Cocaine/crack    never used       REPORTS SUBSTANCE USE: N/A   Hallucinogens never used         REPORTS SUBSTANCE USE: N/A   Inhalants never used         REPORTS SUBSTANCE USE: N/A   Heroin never used         REPORTS SUBSTANCE USE: N/A   Other Opiates never used     REPORTS SUBSTANCE USE: N/A   Benzodiazepine   never used     REPORTS SUBSTANCE USE: N/A   Barbiturates never used     REPORTS SUBSTANCE USE: N/A   Over the counter meds never used     REPORTS SUBSTANCE USE: N/A   Caffeine currently use 17   daily   Nicotine  used in the past 15 11/01/23 REPORTS SUBSTANCE USE: N/A   Other substances not listed above:  Identify:  never used     REPORTS  SUBSTANCE USE: N/A     Patient reported the following problems as a result of their substance use: no problems, not applicable.    Substance Use: No symptoms    Based on the negative CAGE score and clinical interview there  are not indications of drug or alcohol abuse.    Significant Losses / Trauma / Abuse / Neglect Issues:   Patient did not  serve in the .  There are indications or report of significant loss, trauma, abuse or neglect issues related to: are no indications and client denies any losses, trauma, abuse, or neglect concerns.  Concerns for possible neglect are not present.     Safety Assessment:   Patient denies current homicidal ideation and behaviors.  Patient denies current self-injurious ideation and behaviors.    Patient denied risk behaviors associated with substance use.   Patient denies any high risk behaviors associated with mental health symptoms.  Patient reports the following current concerns for their personal safety: None.  Patient reports there are firearms in the house.     yes, they are secured. .    History of Safety Concerns:  Patient denied a history of homicidal ideation.     Patient denied a history of personal safety concerns.    Patient denied a history of assaultive behaviors.    Patient denied a history of sexual assault behaviors.     Patient denied a history of risk behaviors associated with substance use.  Patient denies any history of high risk behaviors associated with mental health symptoms.  Patient reports the following protective factors: forward or future oriented thinking; dedication to family or friends; safe and stable environment; regular sleep; effectively controls impulses; regular physical activity; sense of belonging; secure attachment; abstinence from substances; adherence with prescribed medication; effective problem solving skills; commitment to well being; sense of meaning; financial stability; strong sense of self worth or esteem; sense of personal  control or determination    Risk Plan:  See Recommendations for Safety and Risk Management Plan    Review of Symptoms per patient report:   Depression: No symptoms  Sharmila:  No Symptoms  Psychosis: No Symptoms  Anxiety: Excessive worry, Nervousness, and Ruminations  Panic:  No symptoms  Post Traumatic Stress Disorder:  No Symptoms   Eating Disorder: No Symptoms  ADD / ADHD:  Inattentive, Poor task completion, Poor organizational skills, and Distractibility- Active ADHD dx  Conduct Disorder: No symptoms  Autism Spectrum Disorder: No symptoms  Obsessive Compulsive Disorder: No Symptoms    Patient reports the following compulsive behaviors and treatment history:  N/a .      Current Stressors / Issues:  MH update: PUSHPA.  No depressive for years.  Denies any acute anxiety or panic.  Notes that she feels great.  Stresses:  Chronic pain  Appetite: Denies  Sleep: Denies  Outpatient Provider updates:  [facility], Siobhan M, graduate Dbt next week; planning to do peer support after  SI/SIB/HI:  Denies hx or current  MANOLO: Sober ETOH.  THC  Preg:N/a  Side effects/compliance:  More restless less, tapping;  wanting off lexapro slowly?  Gabapentin was added to day time from RLS doc; helpful  Interventions:  Writer supported DBT completion and success of skills implemented.   Most important: - to come off meds      Diagnostic Criteria:   Generalized Anxiety Disorder  A. Excessive anxiety and worry about a number of events or activities (such as work or school performance).   B. The person finds it difficult to control the worry.   - Restlessness or feeling keyed up or on edge.    - Difficulty concentrating or mind going blank.    - Irritability.   D. The focus of the anxiety and worry is not confined to features of an Axis I disorder.  E. The anxiety, worry, or physical symptoms cause clinically significant distress or impairment in social, occupational, or other important areas of functioning.   F. The disturbance is not due to the  direct physiological effects of a substance (e.g., a drug of abuse, a medication) or a general medical condition (e.g., hyperthyroidism) and does not occur exclusively during a Mood Disorder, a Psychotic Disorder, or a Pervasive Developmental Disorder.    Functional Status:  Patient reports the following functional impairments:  relationship(s), self-care, social interactions, and work / vocational responsibilities.     Nonprogrammatic care:  Patient is requesting basic services to address current mental health concerns.    Clinical Summary:  1. Psychosocial, Cultural and Contextual Factors: MANOLO hx  .  2. Principal DSM5 Diagnoses  (Sustained by DSM5 Criteria Listed Above):   300.02 (F41.1) Generalized Anxiety Disorder.  3. Other Diagnoses that is relevant to services:   Attention-Deficit/Hyperactivity Disorder  314.00 (F90.0) Predominantly inattentive presentation. Hx of MDD; hx of Alcohol abuse, in remission  4. Provisional Diagnosis:    5. Prognosis: Relieve Acute Symptoms.  6. Likely consequences of symptoms if not treated: decompensation of MH.  7. Client strengths include:  committed to sobriety, educated, empathetic, employed, has a previous history of therapy, insightful, intelligent, open to learning, open to suggestions / feedback, wants to learn, and willing to ask questions .     Recommendations:     1. Plan for Safety and Risk Management:   Safety and Risk: Recommended that patient call 911 or go to the local ED should there be a change in any of these risk factors..          Report to child / adult protection services was NA.     2. Patient's identified mental health concerns with a cultural influence will be addressed by per Pt request .     3. Initial Treatment will focus on:    Anxiety - . .     4. Resources/Service Plan:    services are not indicated.   Modifications to assist communication are not indicated.   Additional disability accommodations are not indicated.      5.  Collaboration:   Collaboration / coordination of treatment will be initiated with the following  support professionals: psychiatry.      6.  Referrals:   The following referral(s) will be initiated: Psychiatry.       A Release of Information has been obtained for the following:  N/a .     Clinical Substantiation/medical necessity for the above recommendations:  Pt has a hx of anxiety and ADHD symptoms that are impacting daily functioning in daily living and social settings. [facility] and [facility] checking on use of coping skills and therapy to help combat these symptoms may provide Pt with relief. Pt reports that they are struggling to manage depressive and ADHD symptoms and again [facility]PS model can assist with providing coping skills, following up that pt is using these skills, safety plan or other interventions along with medication to have the best impact to manage symptoms and provide relief. At this time pt's symptoms are able to be managed with OP services and pt will be referred to a higher level of care if there are abrupt changes in presentation or risk of harm  .    7. MANOLO:  MANOLO:  Discussed the general effects of drugs and alcohol on health and well-being. Provider gave patient printed information about the  effects of chemical use on their health and well being. Recommendations:  N/A .     8. Records:   These were reviewed at time of assessment.   Information in this assessment was obtained from the medical record and  provided by patient who is a good historian.    Patient will have open access to their mental health medical record.    9.   Interactive Complexity: No    10. Safety Plan:  Patient denied any current/recent/lifetime history of suicidal ideation and/or behaviors.  No safety plan indicated at this time.     Provider Name/ Credentials:  Cher Byrd MA [facility]  January 19, 2024

## 2024-01-19 NOTE — PROGRESS NOTES
"Virtual Visit Details    Type of service:  Video Visit     Originating Location (pt. Location): {video visit patient location:835411::\"Home\"}  {PROVIDER LOCATION On-site should be selected for visits conducted from your clinic location or adjoining [facility] hospital, academic office, or other nearby [facility] building. Off-site should be selected for all other provider locations, including home:352692}  Distant Location (provider location):  {virtual location provider:379064}  Platform used for Video Visit: {Virtual Visit Platforms:286121::\"Book&Table\"}  "

## 2024-01-19 NOTE — PATIENT INSTRUCTIONS
Treatment Plan:  Continue Adderall 10 mg daily as needed for ADHD.  Taper off Lexapro/escitalopram: take 7.5 mg daily for 3 weeks then 5 mg daily for 3 weeks then 2.5 mg daily for 3 weeks then stop.   Continue all other cares per primary care provider.   Continue all other medications as reviewed per electronic medical record today.   Safety plan reviewed. To the Emergency Department as needed or call after hours crisis line at [phone] or [phone]. [place] Crisis Text Line. Text [place] to 333066 or Suicide LifeLine Chat: suicidepreventionlifeline.org/chat  Continue individual psychotherapy/DBT for additional support and ongoing development of nonpharmacologic coping skills and strategies.  Schedule an appointment with me in about 4 months or sooner as needed. Call [facility] at [phone] to schedule.  Follow up with primary care provider as planned or for acute medical concerns.  Call the psychiatric nurse line with medication questions or concerns at [phone].  [employer] may be used to communicate with your provider, but this is not intended to be used for emergencies.    Have previously discussed risks of stimulant medication including, but not limited to, decreased appetite, risk of tics (and that they may be lasting), trouble sleeping, cardiac risks such as increased heart rate and blood pressure, and rare risk of sudden cardiac death.  Also risk of addiction/tolerance/dependence.    Patient Education   Collaborative Care Psychiatry Service  What to Expect  Here's what to expect from your Collaborative Care Psychiatry Service (CCPS).   About CCPS  CCPS means 2 people work together to help you get better. You'll meet with a behavioral health clinician and a psychiatric doctor. A behavioral health clinician helps people with mental health problems by talking with them. A psychiatric doctor helps people by giving them medicine.  How it works  At every visit, you'll see the behavioral  "health clinician (BHC) first. They'll talk with you about how you're doing and teach you how to feel better.   Then you'll see the psychiatric doctor. This doctor can help you deal with troubling thoughts and feelings by giving you medicine. They'll make sure you know the plan for your care.   CCPS usually takes 3 to 6 visits. If you need more visits, we may have you start seeing a different psychiatric doctor for ongoing care.  If you have any questions or concerns, we'll be glad to talk with you.  About visits  Be open  At your visits, please talk openly about your problems. It may feel hard, but it's the best way for us to help you.  Cancelling visits  If you can't come to your visit, please call us right away at [phone]. If you don't cancel at least 24 hours (1 full day) before your visit, that's \"late cancellation.\"  Being late to visits  Being very late is the same as not showing up. You will be a \"no show\" if:  Your appointment starts with a BHC, and you're more than 15 minutes late for a 30-minute (half hour) visit. This will also cancel your appointment with the psychiatric doctor.  Your appointment is with a psychiatric doctor only, and you're more than 15 minutes late for a 30-minute (half hour) visit.  Your appointment is with a psychiatric doctor only, and you're more than 30 minutes late for a 60-minute (full hour) visit.  If you cancel late or don't show up 2 times within 6 months, we may end your care.   Getting help between visits  If you need help between visits, you can call us Monday to Friday from 8 a.m. to 4:30 p.m. at [phone].  Emergency care  Call 911 or go to the nearest emergency department if your life or someone else's life is in danger.  Call 188 anytime to reach the national Suicide and Crisis hotline.  Medicine refills  To refill your medicine, call your pharmacy. You can also call [facility]'s [facility] at [phone], Monday to Friday, 8 a.m. to 4:30 " p.m. It can take 1 to 3 business days to get a refill.   Forms, letters, and tests  You may have papers to fill out, like FMLA, short-term disability, and workability. We can help you with these forms at your visits, but you must have an appointment. You may need more than 1 visit for this, to be in an intensive therapy program, or both.  Before we can give you medicine for ADHD, we may refer you to get tested for it or confirm it another way.  We may not be able to give you an emotional support animal letter.  We don't do mental health checks ordered by the court.   We don't do mental health testing, but we can refer you to get tested.   Thank you for choosing us for your care.  For informational purposes only. Not to replace the advice of your health care provider. Copyright   2022 [facility]. All rights reserved. [employer] 712284 - 12/22.

## 2024-01-30 ENCOUNTER — VIRTUAL VISIT (OUTPATIENT)
Dept: ORTHOPEDICS | Facility: CLINIC | Age: 64
End: 2024-01-30
Payer: COMMERCIAL

## 2024-01-30 DIAGNOSIS — M51.369 DDD (DEGENERATIVE DISC DISEASE), LUMBAR: ICD-10-CM

## 2024-01-30 DIAGNOSIS — M51.16 LUMBAR DISC HERNIATION WITH RADICULOPATHY: Primary | ICD-10-CM

## 2024-01-30 PROCEDURE — 99442 PR PHYSICIAN TELEPHONE EVALUATION 11-20 MIN: CPT | Mod: 93 | Performed by: PREVENTIVE MEDICINE

## 2024-01-30 RX ORDER — PREDNISONE 20 MG/1
40 TABLET ORAL DAILY
Qty: 14 TABLET | Refills: 0 | Status: SHIPPED | OUTPATIENT
Start: 2024-01-30 | End: 2024-05-09

## 2024-01-30 NOTE — LETTER
1/30/2024       RE: Zari Francisco  [address] [place] [address]     Dear Colleague,    Thank you for referring your patient, Zari Francisco, to the [facility] [place] at [facility]. Please see a copy of my visit note below.    Patient is a  63   year old who is being evaluated via a billable telephone visit.      What phone number would you like to be contacted at? CELL  How would you like to obtain your AVS? MYCHART        Subjective   Patient is a    63 year old who presents by phone call visit for the following:     HPI   followup for lumbar radicular pain which is getting worse  Had injection last October which helped for a couple months but did not resolve her symptoms      Review of Systems   Constitutional, HEENT, cardiovascular, pulmonary, gi and gu systems are negative, except as otherwise noted.      Objective           Vitals:  No vitals were obtained today due to virtual visit.    Physical Exam   healthy, alert, and no distress  PSYCH: Alert and oriented times 3; coherent speech, normal   rate and volume, able to articulate logical thoughts, able   to abstract reason, no tangential thoughts, no hallucinations   or delusions  His affect is normal  RESP: No cough, no audible wheezing, able to talk in full sentences  Remainder of exam unable to be completed due to telephone visits    Assessment/Plan  64 yo female with lumbar ddd, disc herniations, radicular pain, worse      I independently reviewed the following imaging studies and discussed with patient:  Lumbar MRI shows ddd, disc herniations  Discussed and ordered lumbar MRI for further evaluation    rx given for prednisone        Phone call duration: 20 minutes  Phone call start: 320pm  Phone call end: 340pm  Dr Sweeney      Again, thank you for allowing me to participate in the care of your patient.      Sincerely,    Jacky Sweeney MD

## 2024-01-30 NOTE — PROGRESS NOTES
Patient is a  63   year old who is being evaluated via a billable telephone visit.      What phone number would you like to be contacted at? CELL  How would you like to obtain your AVS? KRISTI        Subjective   Patient is a    63 year old who presents by phone call visit for the following:     HPI   followup for lumbar radicular pain which is getting worse  Had injection last October which helped for a couple months but did not resolve her symptoms      Review of Systems   Constitutional, HEENT, cardiovascular, pulmonary, gi and gu systems are negative, except as otherwise noted.      Objective           Vitals:  No vitals were obtained today due to virtual visit.    Physical Exam   healthy, alert, and no distress  PSYCH: Alert and oriented times 3; coherent speech, normal   rate and volume, able to articulate logical thoughts, able   to abstract reason, no tangential thoughts, no hallucinations   or delusions  His affect is normal  RESP: No cough, no audible wheezing, able to talk in full sentences  Remainder of exam unable to be completed due to telephone visits    Assessment/Plan  62 yo female with lumbar ddd, disc herniations, radicular pain, worse      I independently reviewed the following imaging studies and discussed with patient:  Lumbar MRI shows ddd, disc herniations  Discussed and ordered lumbar MRI for further evaluation    rx given for prednisone        Phone call duration: 20 minutes  Phone call start: 320pm  Phone call end: 340pm  Dr Sweeney

## 2024-02-05 ENCOUNTER — MYC MEDICAL ADVICE (OUTPATIENT)
Dept: SLEEP MEDICINE | Facility: CLINIC | Age: 64
End: 2024-02-05
Payer: COMMERCIAL

## 2024-02-05 NOTE — TELEPHONE ENCOUNTER
Patient wanting to have a new script for Ropinirole.  Requesting 5mg tabs as its easier to split.  Assuming she wants 0.5mg as she was on 1mg tabs.  She is tapering down.  Pended script for 0.5mg Ropinirole, needs to filled out.

## 2024-02-07 RX ORDER — ROPINIROLE 0.5 MG/1
0.5 TABLET, FILM COATED ORAL 2 TIMES DAILY
Qty: 60 TABLET | Refills: 0 | Status: SHIPPED | OUTPATIENT
Start: 2024-02-07 | End: 2024-03-29

## 2024-02-26 ENCOUNTER — TRANSFERRED RECORDS (OUTPATIENT)
Dept: HEALTH INFORMATION MANAGEMENT | Facility: CLINIC | Age: 64
End: 2024-02-26
Payer: COMMERCIAL

## 2024-02-28 ENCOUNTER — TELEPHONE (OUTPATIENT)
Dept: ORTHOPEDICS | Facility: CLINIC | Age: 64
End: 2024-02-28

## 2024-02-28 ENCOUNTER — OFFICE VISIT (OUTPATIENT)
Dept: ORTHOPEDICS | Facility: CLINIC | Age: 64
End: 2024-02-28
Payer: COMMERCIAL

## 2024-02-28 DIAGNOSIS — M51.16 LUMBAR DISC HERNIATION WITH RADICULOPATHY: Primary | ICD-10-CM

## 2024-02-28 DIAGNOSIS — Z91.041 CONTRAST MEDIA ALLERGY: ICD-10-CM

## 2024-02-28 DIAGNOSIS — S32.010S COMPRESSION FRACTURE OF L1 LUMBAR VERTEBRA, SEQUELA: ICD-10-CM

## 2024-02-28 PROCEDURE — 99214 OFFICE O/P EST MOD 30 MIN: CPT | Performed by: PREVENTIVE MEDICINE

## 2024-02-28 RX ORDER — ETODOLAC 500 MG
500 TABLET ORAL 2 TIMES DAILY PRN
Qty: 60 TABLET | Refills: 0 | Status: SHIPPED | OUTPATIENT
Start: 2024-02-28 | End: 2024-03-20

## 2024-02-28 RX ORDER — METHYLPREDNISOLONE 16 MG/1
TABLET ORAL
Qty: 4 TABLET | Refills: 0 | Status: SHIPPED | OUTPATIENT
Start: 2024-02-28 | End: 2024-05-09

## 2024-02-28 SDOH — HEALTH STABILITY: PHYSICAL HEALTH: ON AVERAGE, HOW MANY DAYS PER WEEK DO YOU ENGAGE IN MODERATE TO STRENUOUS EXERCISE (LIKE A BRISK WALK)?: 7 DAYS

## 2024-02-28 SDOH — HEALTH STABILITY: PHYSICAL HEALTH: ON AVERAGE, HOW MANY MINUTES DO YOU ENGAGE IN EXERCISE AT THIS LEVEL?: 150+ MIN
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
## 2024-02-28 NOTE — TELEPHONE ENCOUNTER
Left Voicemail (1st Attempt) for the patient to call back and schedule the following:    Appointment type: return  Provider: dr. Sweeney   Return date: 2 weeks after epidural injection  Specialty phone number: [phone]  Additional appointment(s) needed: dexa and epidural injection    .Fariha coronel Complex   Orthopedics, Podiatry, Sports Medicine, Ent ,Eye , Audiology, Adult Endocrine & Diabetes, Nutrition & Medication Therapy Management Specialties   [facility]

## 2024-02-29 ENCOUNTER — TELEPHONE (OUTPATIENT)
Dept: MEDSURG UNIT | Facility: CLINIC | Age: 64
End: 2024-02-29
Payer: COMMERCIAL

## 2024-02-29 NOTE — TELEPHONE ENCOUNTER
Chart reviewed. Per Tommie REDD - no premed needed as pt has had steroid injections with no premed and no reaction.    Alaina Yeung RN on 2/29/2024 at 10:43 AM

## 2024-03-04 ENCOUNTER — HOSPITAL ENCOUNTER (OUTPATIENT)
Dept: GENERAL RADIOLOGY | Facility: CLINIC | Age: 64
Discharge: HOME | End: 2024-03-04
Attending: PREVENTIVE MEDICINE
Payer: COMMERCIAL

## 2024-03-04 ENCOUNTER — HOSPITAL ENCOUNTER (OUTPATIENT)
Facility: CLINIC | Age: 64
Discharge: HOME | End: 2024-03-04
Admitting: PHYSICIAN ASSISTANT
Payer: COMMERCIAL

## 2024-03-04 VITALS — DIASTOLIC BLOOD PRESSURE: 73 MMHG | SYSTOLIC BLOOD PRESSURE: 127 MMHG | HEART RATE: 72 BPM

## 2024-03-04 VITALS — HEART RATE: 67 BPM | SYSTOLIC BLOOD PRESSURE: 148 MMHG | DIASTOLIC BLOOD PRESSURE: 89 MMHG

## 2024-03-04 DIAGNOSIS — M51.16 LUMBAR DISC HERNIATION WITH RADICULOPATHY: ICD-10-CM

## 2024-03-04 PROCEDURE — 255N000002 HC RX 255 OP 636: Performed by: PREVENTIVE MEDICINE

## 2024-03-04 PROCEDURE — A9585 GADOBUTROL INJECTION: HCPCS | Performed by: PREVENTIVE MEDICINE

## 2024-03-04 PROCEDURE — 999N000154 HC STATISTIC RADIOLOGY XRAY, US, CT, MAR, NM

## 2024-03-04 PROCEDURE — 62323 NJX INTERLAMINAR LMBR/SAC: CPT

## 2024-03-04 PROCEDURE — 250N000009 HC RX 250: Performed by: PREVENTIVE MEDICINE

## 2024-03-04 PROCEDURE — 250N000011 HC RX IP 250 OP 636: Performed by: PREVENTIVE MEDICINE

## 2024-03-04 RX ORDER — LIDOCAINE HYDROCHLORIDE 10 MG/ML
30 INJECTION, SOLUTION EPIDURAL; INFILTRATION; INTRACAUDAL; PERINEURAL ONCE
Status: COMPLETED | OUTPATIENT
Start: 2024-03-04 | End: 2024-03-04

## 2024-03-04 RX ORDER — IOPAMIDOL 408 MG/ML
10 INJECTION, SOLUTION INTRATHECAL ONCE
Status: DISCONTINUED | OUTPATIENT
Start: 2024-03-04 | End: 2024-03-04

## 2024-03-04 RX ORDER — BETAMETHASONE SODIUM PHOSPHATE AND BETAMETHASONE ACETATE 3; 3 MG/ML; MG/ML
5 INJECTION, SUSPENSION INTRA-ARTICULAR; INTRALESIONAL; INTRAMUSCULAR; SOFT TISSUE ONCE
Status: COMPLETED | OUTPATIENT
Start: 2024-03-04 | End: 2024-03-04

## 2024-03-04 RX ORDER — GADOBUTROL 604.72 MG/ML
0.1 INJECTION INTRAVENOUS ONCE
Status: COMPLETED | OUTPATIENT
Start: 2024-03-04 | End: 2024-03-04

## 2024-03-04 RX ADMIN — LIDOCAINE HYDROCHLORIDE 5 ML: 10 INJECTION, SOLUTION EPIDURAL; INFILTRATION; INTRACAUDAL; PERINEURAL at 14:35

## 2024-03-04 RX ADMIN — GADOBUTROL 1 ML: 604.72 INJECTION INTRAVENOUS at 14:36

## 2024-03-04 RX ADMIN — BETAMETHASONE SODIUM PHOSPHATE AND BETAMETHASONE ACETATE 3 ML: 3; 3 INJECTION, SUSPENSION INTRA-ARTICULAR; INTRALESIONAL; INTRAMUSCULAR at 14:35

## 2024-03-04 ASSESSMENT — ACTIVITIES OF DAILY LIVING (ADL): ADLS_ACUITY_SCORE: 35

## 2024-03-04 NOTE — PROGRESS NOTES
Care Suites Post Procedure Note    Patient Information  Name: Zari Francisco  Age: 63 year old    Post Procedure- Lumbar epidural  Time patient returned to Care Suites: 1445  Concerns/abnormal assessment:   Mid to lower lumbar area CDI , Denies pain noted HTN  If abnormal assessment, provider notified: N/A  Plan/Other:   Monitor patient for 10 minutes then discharge to home     Care Suites Discharge Nursing Note    Discharge Education:  Discharge instructions reviewed: Yes  Additional education/resources provided:   Patient/patient representative verbalizes understanding: Yes  Patient discharging on new medications: No  Medication education completed: N/A    Discharge Plans:   Discharge location: home  Discharge ride contacted: Yes  Approximate discharge time: 1505    Discharge Criteria:  Discharge criteria met and vital signs stable: Yes    Patient Belongs:  Patient belongings returned to patient: Yes    Angelica Watson RN

## 2024-03-04 NOTE — DISCHARGE INSTRUCTIONS
Steroid Injection Discharge Instructions     After you go home:    You may resume your normal diet.    Care of Puncture Site:    If you have a bandaid on your puncture site, you may remove it the next morning  You may shower tomorrow  No bath tubs, whirlpools or swimming pool for at least 48 hours  Use ice packs as needed for discomfort     Activity:    Minimize your activity today. You may gradually resume your normal activity as tolerated  Avoid vigorous or strenuous activity until your symptoms improve or as directed by your doctor  Do NOT drive a vehicle for a few hours after the injection - or longer if you develop numbness in your arm or leg    Medicines:    You may resume all medications, including blood thinners  Resume your Warfarin/Coumadin at your regular dose today. Follow up with your provider to have your INR rechecked  Resume your Platelet Inhibitors and Aspirin tomorrow at your regular dose  For minor discomfort, you may take Acetaminophen (Tylenol) or Ibuprofen (Advil)    Pain:     You may experience increased or different pain over the next 24-48 hours  For the next 48 hrs - you may use ice packs for discomfort     Call your primary care doctor if:    You have severe pain that does not improve with pain medication  You have chills or a fever greater than 101 F (38 C)  The site is red, swollen, hot or tender  Increase in pain, weakness or numbness  New problems with your bowel or bladder  Any questions or concerns    What to watch for:    It can be normal to have some bruising or slight swelling at the puncture site.   After the procedure, you may have some new weakness or numbness down your arm/leg from the numbing medicine. This should resolve in a few hours.   You may feel some temporary relief from the numbing medicine, but that will wear off within a few hours.  Your symptoms may return to pre procedure level, or can even be worse for the first 1-2 days.  For many people, the steroid begins to  provide some relief within 2-3 days, but it can take up to 2 weeks to obtain the full results.  Some people will get lasting relief from a single injection. Others may require up to 3 injections to get results. If you have more than one steroid injection, they should be given 2 weeks apart.  If you have no improvement in your symptoms after two weeks, please contact the doctor who ordered this procedure to discuss the next steps.  Side effects of your steroid injection are mild and will go away in 2-3 days  Insomnia  Irritability  Flushed face  Water retention  Restlessness  Difficulty sleeping  Increased appetite  Increased blood sugar  If you are diabetic, monitor your blood sugar closely. Contact the provider who manages your diabetes to help you control your blood sugar if needed.    If you have questions or concerns call:                  [facility] Radiology Dept @ [phone]                                    between 8am-4:30pm Mon-Fri    If you have urgent questions outside of these normal business hours, please contact the [facility] on call doctor @ [phone]

## 2024-03-20 ENCOUNTER — VIRTUAL VISIT (OUTPATIENT)
Dept: ORTHOPEDICS | Facility: CLINIC | Age: 64
End: 2024-03-20
Payer: COMMERCIAL

## 2024-03-20 DIAGNOSIS — M51.369 DDD (DEGENERATIVE DISC DISEASE), LUMBAR: ICD-10-CM

## 2024-03-20 DIAGNOSIS — M51.16 LUMBAR DISC HERNIATION WITH RADICULOPATHY: Primary | ICD-10-CM

## 2024-03-20 PROCEDURE — 99442 PR PHYSICIAN TELEPHONE EVALUATION 11-20 MIN: CPT | Mod: 93 | Performed by: PREVENTIVE MEDICINE

## 2024-03-20 RX ORDER — ETODOLAC 500 MG
500 TABLET ORAL 2 TIMES DAILY PRN
Qty: 60 TABLET | Refills: 0 | Status: SHIPPED | OUTPATIENT
Start: 2024-03-20

## 2024-03-20 NOTE — LETTER
3/20/2024         RE: Zari Francisco  [address] [place] [address]        Dear Colleague,    Thank you for referring your patient, Zari Francisco, to the [facility] [place]. Please see a copy of my visit note below.    Patient is a   63  year old who is being evaluated via a billable telephone visit.      What phone number would you like to be contacted at? CELL  How would you like to obtain your AVS? MYCHART        Subjective   Patient is a  63   year old who presents by phone call visit for the following:     HPI   Had lumbar epidural injection about 2 weeks prior  Overall improved  Over 50% improvement  Still having some low back pain but leg symptoms resolved      Review of Systems   Constitutional, HEENT, cardiovascular, pulmonary, gi and gu systems are negative, except as otherwise noted.      Objective           Vitals:  No vitals were obtained today due to virtual visit.    Physical Exam   healthy, alert, and no distress  PSYCH: Alert and oriented times 3; coherent speech, normal   rate and volume, able to articulate logical thoughts, able   to abstract reason, no tangential thoughts, no hallucinations   or delusions  His affect is normal  RESP: No cough, no audible wheezing, able to talk in full sentences  Remainder of exam unable to be completed due to telephone visits    Assessment/Plan  62 yo female with lumbar ddd, disc herniations, radiculopathy    I independently reviewed the following imaging studies and discussed with patient:  Lumbar MRI shows ddd, disc herniations  Discussed followup in 1 month  Will consider planning another injection this spring /summer    Phone call duration: 20 minutes  Phone call start: 820am  Phone call end: 840am  Dr Sweeney      Again, thank you for allowing me to participate in the care of your patient.        Sincerely,        Jacky Sweeney MD

## 2024-03-20 NOTE — LETTER
3/20/2024         RE: Zari Francisco  [address] [place] [address]        Dear Colleague,    Thank you for referring your patient, Zari Francisco, to the [facility]IN[facility] [place]. Please see a copy of my visit note below.    Patient is a   63  year old who is being evaluated via a billable telephone visit.      What phone number would you like to be contacted at? CELL  How would you like to obtain your AVS? MYCHART        Subjective   Patient is a  63   year old who presents by phone call visit for the following:     HPI   Had lumbar epidural injection about 2 weeks prior  Overall improved  Over 50% improvement  Still having some low back pain but leg symptoms resolved      Review of Systems   Constitutional, HEENT, cardiovascular, pulmonary, gi and gu systems are negative, except as otherwise noted.      Objective           Vitals:  No vitals were obtained today due to virtual visit.    Physical Exam   healthy, alert, and no distress  PSYCH: Alert and oriented times 3; coherent speech, normal   rate and volume, able to articulate logical thoughts, able   to abstract reason, no tangential thoughts, no hallucinations   or delusions  His affect is normal  RESP: No cough, no audible wheezing, able to talk in full sentences  Remainder of exam unable to be completed due to telephone visits    Assessment/Plan  62 yo female with lumbar ddd, disc herniations, radiculopathy    I independently reviewed the following imaging studies and discussed with patient:  Lumbar MRI shows ddd, disc herniations  Discussed followup in 1 month  Will consider planning another injection this spring /summer    Phone call duration: 20 minutes  Phone call start: 820am  Phone call end: 840am  Dr Sweeney      Again, thank you for allowing me to participate in the care of your patient.        Sincerely,        Jacky Sweeney MD

## 2024-03-20 NOTE — PROGRESS NOTES
Patient is a   63  year old who is being evaluated via a billable telephone visit.      What phone number would you like to be contacted at? CELL  How would you like to obtain your AVS? KRISTI        Subjective   Patient is a  63   year old who presents by phone call visit for the following:     HPI   Had lumbar epidural injection about 2 weeks prior  Overall improved  Over 50% improvement  Still having some low back pain but leg symptoms resolved      Review of Systems   Constitutional, HEENT, cardiovascular, pulmonary, gi and gu systems are negative, except as otherwise noted.      Objective           Vitals:  No vitals were obtained today due to virtual visit.    Physical Exam   healthy, alert, and no distress  PSYCH: Alert and oriented times 3; coherent speech, normal   rate and volume, able to articulate logical thoughts, able   to abstract reason, no tangential thoughts, no hallucinations   or delusions  His affect is normal  RESP: No cough, no audible wheezing, able to talk in full sentences  Remainder of exam unable to be completed due to telephone visits    Assessment/Plan  62 yo female with lumbar ddd, disc herniations, radiculopathy    I independently reviewed the following imaging studies and discussed with patient:  Lumbar MRI shows ddd, disc herniations  Discussed followup in 1 month  Will consider planning another injection this spring /summer    Phone call duration: 20 minutes  Phone call start: 820am  Phone call end: 840am  Dr Sweeney

## 2024-03-29 ENCOUNTER — MYC REFILL (OUTPATIENT)
Dept: SLEEP MEDICINE | Facility: CLINIC | Age: 64
End: 2024-03-29
Payer: COMMERCIAL

## 2024-03-29 NOTE — TELEPHONE ENCOUNTER
Pending Prescriptions:                       Disp   Refills    rOPINIRole (REQUIP) 0.5 MG tablet         60 tab*0            Sig: Take 1 tablet (0.5 mg) by mouth 2 times daily          Last Written Prescription Date: 2/7/24  Last Fill Quantity: 60   # refills: 0  Last Office Visit: 1/16/24   Future Office visit: 5/28/24       Routing refill request to provider for review/approval because:  Drug not on the [facility], [facility] or [facility] refill protocol or controlled substance

## 2024-04-01 DIAGNOSIS — G25.81 RESTLESS LEG SYNDROME: ICD-10-CM

## 2024-04-01 PROBLEM — H02.729 HYPOTRICHOSIS OF EYELID: Status: ACTIVE | Noted: 2023-06-02

## 2024-04-01 RX ORDER — ROPINIROLE 0.5 MG/1
0.5 TABLET, FILM COATED ORAL 2 TIMES DAILY
Qty: 60 TABLET | Refills: 0 | Status: SHIPPED | OUTPATIENT
Start: 2024-04-01 | End: 2024-04-26

## 2024-04-01 NOTE — TELEPHONE ENCOUNTER
"Pending Prescriptions:                       Disp   Refills    rOPINIRole (REQUIP) 1 MG tablet           135 ta*0                Last Written Prescription Date:  10/27/23  Last Fill Quantity: 135,   # refills: 0  Last Office Visit: 1/16/24  Future Office visit:  5/28/24    Routing refill request to provider for review/approval because:  Does not pass protocol    Requested Prescriptions   Pending Prescriptions Disp Refills    rOPINIRole (REQUIP) 1 MG tablet 135 tablet 0       Antiparkinson's Agents Protocol Failed - 4/1/2024  2:16 PM        Failed - CBC on record in past 12 months     Recent Labs   Lab Test 02/16/23  0735   WBC 11.4*   RBC 4.81   HGB 14.3   HCT 43.7                    Failed - ALT on record in past 12 months         Recent Labs   Lab Test 02/16/23  0735   ALT 26             Failed - Serum Creatinine on file in past 12 months     Recent Labs   Lab Test 02/16/23  0735   CR 0.69                 Failed - Recent (6 mo) or future (30 days) visit within the authorizing provider's specialty     Patient had office visit in the last 6 months or has a visit in the next 30 days with authorizing provider or within the authorizing provider's specialty.  See \"Patient Info\" tab in inbasket, or \"Choose Columns\" in Meds & Orders section of the refill encounter.            Passed - Blood pressure under 140/90 in past 12 months     BP Readings from Last 3 Encounters:   03/04/24 (!) 148/89   03/04/24 127/73   01/11/24 130/89       No data recorded            Passed - Medication is active on med list        Passed - Patient is age 18 or older        Passed - No active pregnancy on record        Passed - No positive pregnancy test in the past 12 months               "

## 2024-04-01 NOTE — TELEPHONE ENCOUNTER
ROPINIROLE 0.5 MG TABLETS    Last Written Prescription Date:  2/7/24  Last Fill Quantity: 60,  # refills: 0   Last office visit: Visit date not found ; last virtual visit: 1/16/2024 with prescribing provider:  Cynthia Chacko Office Visit: none

## 2024-04-02 ENCOUNTER — ANCILLARY PROCEDURE (OUTPATIENT)
Dept: BONE DENSITY | Facility: CLINIC | Age: 64
End: 2024-04-02
Attending: PREVENTIVE MEDICINE
Payer: COMMERCIAL

## 2024-04-02 DIAGNOSIS — S32.010S COMPRESSION FRACTURE OF L1 LUMBAR VERTEBRA, SEQUELA: ICD-10-CM

## 2024-04-02 PROCEDURE — 77080 DXA BONE DENSITY AXIAL: CPT | Performed by: RADIOLOGY

## 2024-04-03 RX ORDER — ROPINIROLE 1 MG/1
TABLET, FILM COATED ORAL
Qty: 135 TABLET | Refills: 0 | Status: SHIPPED | OUTPATIENT
Start: 2024-04-03 | End: 2024-04-29

## 2024-04-04 ENCOUNTER — MYC MEDICAL ADVICE (OUTPATIENT)
Dept: FAMILY MEDICINE | Facility: CLINIC | Age: 64
End: 2024-04-04
Payer: COMMERCIAL

## 2024-04-04 DIAGNOSIS — Z11.1 SCREENING EXAMINATION FOR PULMONARY TUBERCULOSIS: Primary | ICD-10-CM

## 2024-04-09 ENCOUNTER — MYC REFILL (OUTPATIENT)
Dept: SLEEP MEDICINE | Facility: CLINIC | Age: 64
End: 2024-04-09
Payer: COMMERCIAL

## 2024-04-16 ENCOUNTER — LAB (OUTPATIENT)
Dept: LAB | Facility: CLINIC | Age: 64
End: 2024-04-16
Payer: COMMERCIAL

## 2024-04-16 DIAGNOSIS — Z11.1 SCREENING EXAMINATION FOR PULMONARY TUBERCULOSIS: ICD-10-CM

## 2024-04-16 PROCEDURE — 86481 TB AG RESPONSE T-CELL SUSP: CPT

## 2024-04-16 PROCEDURE — 36415 COLL VENOUS BLD VENIPUNCTURE: CPT

## 2024-04-19 LAB
GAMMA INTERFERON BACKGROUND BLD IA-ACNC: 0.06 IU/ML
M TB IFN-G BLD-IMP: NEGATIVE
M TB IFN-G CD4+ BCKGRND COR BLD-ACNC: 9.94 IU/ML
MITOGEN IGNF BCKGRD COR BLD-ACNC: 0 IU/ML
MITOGEN IGNF BCKGRD COR BLD-ACNC: 0.02 IU/ML
QUANTIFERON MITOGEN: 10 IU/ML
QUANTIFERON NIL TUBE: 0.06 IU/ML
QUANTIFERON TB1 TUBE: 0.06 IU/ML
QUANTIFERON TB2 TUBE: 0.08

## 2024-04-19 RX ORDER — GABAPENTIN 100 MG/1
CAPSULE ORAL
Qty: 180 CAPSULE | Refills: 0 | Status: SHIPPED | OUTPATIENT
Start: 2024-04-19 | End: 2024-05-28

## 2024-04-26 ENCOUNTER — MYC REFILL (OUTPATIENT)
Dept: PSYCHIATRY | Facility: CLINIC | Age: 64
End: 2024-04-26
Payer: COMMERCIAL

## 2024-04-26 DIAGNOSIS — Z86.59 HX OF MAJOR DEPRESSION: ICD-10-CM

## 2024-04-26 DIAGNOSIS — F41.1 GENERALIZED ANXIETY DISORDER: ICD-10-CM

## 2024-04-26 RX ORDER — ESCITALOPRAM OXALATE 5 MG/1
5 TABLET ORAL DAILY
Qty: 15 TABLET | Refills: 0 | Status: SHIPPED | OUTPATIENT
Start: 2024-04-26 | End: 2024-05-09

## 2024-04-26 NOTE — TELEPHONE ENCOUNTER
Ropinirole 0.5 MG Tablets      Last Written Prescription Date:  4/1/2024  Last Fill Quantity: 60,   # refills: 0  Last Office Visit: 1/16/2024 ana maria/ Fox  Future Office visit:  5/28/2024 Med check with Fox Vogel CMA

## 2024-04-26 NOTE — TELEPHONE ENCOUNTER
1) RN received a refill request via Right Fax from    [id number] - [place], [place] - [address] for escitalopram (LEXAPRO) 5 MG tablet  - Take 1 tablet (5 mg) by mouth daily     2) This medication was last prescribed on 1/19/2024 for qty of 30 with 1 refill(s).  - Per pharmacy fax last refill of 30 was filled on 3/31/2024.     3) Refill Information  Date of Last Office Visit: 1/19/2024  Date of Next Office Visit: 5/9/2024  No shows since last visit: none  Cancellations since last visit: none     Review of [place] ?: N/A for this medication     Lapse in medication adherence greater than 5 days?: no, per pharmacy last refill #30 on 3/31/2024  If yes, call patient and gather details: n/a   Medication refill request verified as identical to current order?: yes   Result of Last DAM, VPA, Li+ Level, CBC, or Carbamazepine Level (at or since last visit): N/A    Last visit treatment plan:   1/19/2024:  Patient overall doing very well.  Mood stable and anxiety manageable.  DBT has been very helpful and will graduate in a few weeks.  Interested in tapering off Lexapro.  We will start a very slow taper.  Can restart if symptoms worsen.  No acute safety concerns.  No SI.  No problematic drug or alcohol use.     Medication side effects and alternatives were reviewed. Health promotion activities recommended and reviewed today. All questions addressed. Education and counseling completed regarding risks and benefits of medications and psychotherapy options. Recommend therapy for additional support.      Treatment Plan:  Continue Adderall 10 mg daily as needed for ADHD.  Taper off Lexapro/escitalopram: take 7.5 mg daily for 3 weeks then 5 mg daily for 3 weeks then 2.5 mg daily for 3 weeks then stop.   Continue all other cares per primary care provider.   Continue all other medications as reviewed per electronic medical record today.   Safety plan reviewed. To the Emergency  Department as needed or call after hours crisis line at [phone] or [phone]. [place] Crisis Text Line. Text [place] to 480934 or Suicide LifeLine Chat: suicidepreventionlifeline.org/chat  Continue individual psychotherapy/DBT for additional support and ongoing development of nonpharmacologic coping skills and strategies.  Schedule an appointment with me in about 4 months or sooner as needed. Call [facility] at [phone] to schedule.  Follow up with primary care provider as planned or for acute medical concerns.  Call the psychiatric nurse line with medication questions or concerns at [phone].  [employer] may be used to communicate with your provider, but this is not intended to be used for emergencies.    RN LVM for the patient to call back at [phone] or respond back to MyC message.  Need to know did she taper off this medication as indicated in the 1/19/2024 treatment plan?    Maryellen Calvo RN on 4/26/2024 at 1:50 PM

## 2024-04-26 NOTE — TELEPHONE ENCOUNTER
"Patient responded back via MyC -  \"That s correct. I decided not to go off the Lexapeo until I see Dr Valentine again on May 9. I was thinking I may have been on a high from graduating DBT. I ve had that feeling before and I just want to make sure it s not the meds talking and telling I m doing great.  Instead I want to make sure I really am doing well and go off it at that time. I hope this makes sense - smile. If not, please feel free to call me at [phone].   Thank you,   Zari Francisco\"      RN sent a MyC message to the patient to clarify what dose she is taking daily.    Maryellen Calvo RN on 4/26/2024 at 2:21 PM    "

## 2024-04-26 NOTE — TELEPHONE ENCOUNTER
Patent replied back and confirmed that she is taking 5 mg daily at bedtime of escitalopram (LEXAPRO) until she sees Dr. Valentine.    RN will send to Psych provider poll to review refill and EPIC notification about an interaction with this medication and etodolac     Maryellen Calvo RN on 4/26/2024 at 3:17 PM

## 2024-04-29 RX ORDER — ROPINIROLE 0.5 MG/1
0.5 TABLET, FILM COATED ORAL 2 TIMES DAILY
Qty: 60 TABLET | Refills: 0 | Status: SHIPPED | OUTPATIENT
Start: 2024-05-01 | End: 2024-05-28

## 2024-05-01 ENCOUNTER — MYC MEDICAL ADVICE (OUTPATIENT)
Dept: SLEEP MEDICINE | Facility: CLINIC | Age: 64
End: 2024-05-01
Payer: COMMERCIAL

## 2024-05-01 DIAGNOSIS — G25.81 RESTLESS LEG SYNDROME: ICD-10-CM

## 2024-05-02 ENCOUNTER — VIRTUAL VISIT (OUTPATIENT)
Dept: ORTHOPEDICS | Facility: CLINIC | Age: 64
End: 2024-05-02
Payer: COMMERCIAL

## 2024-05-02 ENCOUNTER — MYC MEDICAL ADVICE (OUTPATIENT)
Dept: ORTHOPEDICS | Facility: CLINIC | Age: 64
End: 2024-05-02

## 2024-05-02 DIAGNOSIS — Z91.041 CONTRAST MEDIA ALLERGY: ICD-10-CM

## 2024-05-02 DIAGNOSIS — M51.369 DDD (DEGENERATIVE DISC DISEASE), LUMBAR: ICD-10-CM

## 2024-05-02 DIAGNOSIS — M54.16 LUMBAR RADICULAR PAIN: ICD-10-CM

## 2024-05-02 DIAGNOSIS — M51.16 LUMBAR DISC HERNIATION WITH RADICULOPATHY: Primary | ICD-10-CM

## 2024-05-02 PROCEDURE — 99442 PR PHYSICIAN TELEPHONE EVALUATION 11-20 MIN: CPT | Mod: 93 | Performed by: PREVENTIVE MEDICINE

## 2024-05-02 RX ORDER — METHYLPREDNISOLONE 16 MG/1
TABLET ORAL
Qty: 4 TABLET | Refills: 0 | Status: SHIPPED | OUTPATIENT
Start: 2024-05-02 | End: 2024-05-09

## 2024-05-02 RX ORDER — GABAPENTIN 300 MG/1
CAPSULE ORAL
Qty: 270 CAPSULE | Refills: 0 | Status: SHIPPED | OUTPATIENT
Start: 2024-05-02 | End: 2024-05-28

## 2024-05-02 NOTE — TELEPHONE ENCOUNTER
Pending Prescriptions:                       Disp   Refills    gabapentin (NEURONTIN) 300 MG capsule     270 ca*0            Sig: Take 3 tablets at bedtime          Last Written Prescription Date:  1/16/24  Last Fill Quantity: 270   # refills: 0  Last Office Visit: 1/16/24  Future Office visit: 5/28/24     Routing refill request to provider for review/approval because:  Drug not on the [facility] or [facility] refill protocol or controlled substance

## 2024-05-02 NOTE — TELEPHONE ENCOUNTER
Patient is a   63  year old who is being evaluated via a billable telephone visit.      What phone number would you like to be contacted at? CELL  How would you like to obtain your AVS? KRISTI        Subjective   Patient is a   63  year old who presents by phone call visit for the following:     HPI   Followup for lumbar radiculopathy  Left leg radicular /pain and numbness worse  Low back pain worsening  Had improvement significant from previous YENNIFER  Cont. To do HEP  Wants to consider PT again    Review of Systems   Constitutional, HEENT, cardiovascular, pulmonary, gi and gu systems are negative, except as otherwise noted.      Objective           Vitals:  No vitals were obtained today due to virtual visit.    Physical Exam   healthy, alert, and no distress  PSYCH: Alert and oriented times 3; coherent speech, normal   rate and volume, able to articulate logical thoughts, able   to abstract reason, no tangential thoughts, no hallucinations   or delusions  His affect is normal  RESP: No cough, no audible wheezing, able to talk in full sentences  Remainder of exam unable to be completed due to telephone visits    Assessment/Plan  64 yo female with lumbar ddd, disc herniations, radiculopathy, worse again    I independently reviewed the following imaging studies and discussed with patient:  Lumbar MRI shows ddd, disc herniations  Discussed and ordered L4/5 TF injection   Had over 50% improvement from previous YENNIFER 3 months ago but not resolution of symptoms  Given order for PT as welll  Followup after YENNIFER and PT          Phone call duration: 20 minutes  Phone call start: 450pm  Phone call end: 510pm  Dr Sweeney

## 2024-05-02 NOTE — PROGRESS NOTES
Left voicemail, ordered lumbar YENNIFER, but will need to document a phone visit with her when she is available

## 2024-05-02 NOTE — PROGRESS NOTES
Patient is a   63  year old who is being evaluated via a billable telephone visit.       What phone number would you like to be contacted at? CELL  How would you like to obtain your AVS? KRISTI           Subjective   Patient is a   63  year old who presents by phone call visit for the following:      HPI   Followup for lumbar radiculopathy  Left leg radicular /pain and numbness worse  Low back pain worsening  Had improvement significant from previous YENNIFER  Cont. To do HEP  Wants to consider PT again     Review of Systems   Constitutional, HEENT, cardiovascular, pulmonary, gi and gu systems are negative, except as otherwise noted.      Objective             Vitals:  No vitals were obtained today due to virtual visit.     Physical Exam   healthy, alert, and no distress  PSYCH: Alert and oriented times 3; coherent speech, normal   rate and volume, able to articulate logical thoughts, able   to abstract reason, no tangential thoughts, no hallucinations   or delusions  His affect is normal  RESP: No cough, no audible wheezing, able to talk in full sentences  Remainder of exam unable to be completed due to telephone visits     Assessment/Plan  62 yo female with lumbar ddd, disc herniations, radiculopathy, worse again     I independently reviewed the following imaging studies and discussed with patient:  Lumbar MRI shows ddd, disc herniations  Discussed and ordered L4/5 TF injection   Had over 50% improvement from previous YENNIFER 3 months ago but not resolution of symptoms  Given order for PT as welll  Followup after YENNIFER and PT            Phone call duration: 20 minutes  Phone call start: 450pm  Phone call end: 510pm  Dr Sweeney

## 2024-05-02 NOTE — LETTER
5/2/2024       RE: Zari Francisco  [address]  [place] [address]     Dear Colleague,    Thank you for referring your patient, Zari Francisco, to the [facility] [place] at [facility]. Please see a copy of my visit note below.    Patient is a   63  year old who is being evaluated via a billable telephone visit.       What phone number would you like to be contacted at? CELL  How would you like to obtain your AVS? MYCHART           Subjective   Patient is a   63  year old who presents by phone call visit for the following:      HPI   Followup for lumbar radiculopathy  Left leg radicular /pain and numbness worse  Low back pain worsening  Had improvement significant from previous YENNIFER  Cont. To do HEP  Wants to consider PT again     Review of Systems   Constitutional, HEENT, cardiovascular, pulmonary, gi and gu systems are negative, except as otherwise noted.      Objective             Vitals:  No vitals were obtained today due to virtual visit.     Physical Exam   healthy, alert, and no distress  PSYCH: Alert and oriented times 3; coherent speech, normal   rate and volume, able to articulate logical thoughts, able   to abstract reason, no tangential thoughts, no hallucinations   or delusions  His affect is normal  RESP: No cough, no audible wheezing, able to talk in full sentences  Remainder of exam unable to be completed due to telephone visits     Assessment/Plan  64 yo female with lumbar ddd, disc herniations, radiculopathy, worse again     I independently reviewed the following imaging studies and discussed with patient:  Lumbar MRI shows ddd, disc herniations  Discussed and ordered L4/5 TF injection   Had over 50% improvement from previous YENNIFER 3 months ago but not resolution of symptoms  Given order for PT as welll  Followup after YENNIFER and PT            Phone call duration: 20 minutes  Phone call start: 450pm  Phone call end: 510pm    Zahra      Again, thank you for allowing me to participate in the care of your patient.      Sincerely,    Jacky Sweeney MD

## 2024-05-07 ENCOUNTER — THERAPY VISIT (OUTPATIENT)
Dept: PHYSICAL THERAPY | Facility: CLINIC | Age: 64
End: 2024-05-07
Attending: PREVENTIVE MEDICINE
Payer: COMMERCIAL

## 2024-05-07 DIAGNOSIS — G89.29 CHRONIC BILATERAL LOW BACK PAIN WITH LEFT-SIDED SCIATICA: Primary | ICD-10-CM

## 2024-05-07 DIAGNOSIS — M54.42 CHRONIC BILATERAL LOW BACK PAIN WITH LEFT-SIDED SCIATICA: Primary | ICD-10-CM

## 2024-05-07 DIAGNOSIS — M51.16 LUMBAR DISC HERNIATION WITH RADICULOPATHY: ICD-10-CM

## 2024-05-07 DIAGNOSIS — M51.369 DDD (DEGENERATIVE DISC DISEASE), LUMBAR: ICD-10-CM

## 2024-05-07 PROCEDURE — 97110 THERAPEUTIC EXERCISES: CPT | Mod: GP | Performed by: PHYSICAL THERAPIST

## 2024-05-07 PROCEDURE — 97161 PT EVAL LOW COMPLEX 20 MIN: CPT | Mod: GP | Performed by: PHYSICAL THERAPIST

## 2024-05-07 NOTE — PROGRESS NOTES
[facility]  May 9, 2024      Behavioral Health Clinician Progress Note    Patient Name: Zari Francisco           Service Type:  Individual      Service Location:   [facility] / Email (patient reached)     Session Start Time: 734am  Session End Time: 756am      Session Length: 16 - 37      Attendees: Client     Service Modality:  Video Visit:      Provider verified identity through the following two step process.  Patient provided:  Patient is known previously to provider    Telemedicine Visit: The patient's condition can be safely assessed and treated via synchronous audio and visual telemedicine encounter.      Reason for Telemedicine Visit: Services only offered telehealth    Originating Site (Patient Location): Patient's home    Distant Site (Provider Location): Provider Remote Setting- Home Office    Consent:  The patient/guardian has verbally consented to: the potential risks and benefits of telemedicine (video visit) versus in person care; bill my insurance or make self-payment for services provided; and responsibility for payment of non-covered services.     Patient would like the video invitation sent by:  My Chart    Mode of Communication:  Video Conference via [facility]    Distant Location (Provider):  Off-site    As the provider I attest to compliance with applicable laws and regulations related to telemedicine.    Visit Activities (Refresh list every visit): [facility] Only    Diagnostic Assessment Date: 1/19/24 Cher Byrd MA [facility]  Treatment Plan Review Date: 5/9/24  See Flowsheets for today's PHQ-9 and PUSHPA-7 results  Previous PHQ-9:       7/13/2023     5:56 AM 1/18/2024     4:46 PM 5/9/2024     7:28 AM   PHQ-9 SCORE   PHQ-9 Total Score MyChart 4 (Minimal depression) 0 1 (Minimal depression)   PHQ-9 Total Score 4 0 1     Previous PUSHPA-7:       9/29/2022     7:49 AM 11/9/2022    10:12 AM 6/5/2023     3:32 PM   PUSHPA-7 SCORE   Total Score  9 (mild anxiety) 9 (mild  anxiety)   Total Score 7 9 9    9       SHAUN LEVEL:      3/9/2011     2:00 PM   SHAUN Score (Last Two)   SHAUN Raw Score 51   Activation Score 91.6   SHAUN Level 4       DATA  Extended Session (60+ minutes): No  Interactive Complexity: No  Crisis: No  [facility] Patient: No    Treatment Objective(s) Addressed in This Session:  Target Behavior(s): disease management/lifestyle changes mgmt of anxiety    Anxiety: on going maintenance of anxiety    Current Stressors / Issues:  MH update:  Her cat is currently missing.  Went down to 5mg of Lexapro and stayed on it  Stresses:  Son is  with wife- going down and visiting every week.  She (ex) attempted to have him committed to the hospital - allegations.  He is currently living in a house/boat .  5 grand kids total.  This relationship has 2 kids.  Appetite: N/a  Sleep: N/a  Outpatient Provider updates: Graduated DBT in Jan.  Denies need for on going.  Social peer DBT group  SI/SIB/HI: N/a No concerns  MANOLO: Sober ETOH. THC  Side effects/compliance: N/a  Interventions:  [facility] validated interpersonal stressors  Most important:  Stable on lexapro outside of stressors that are current    1/19   update: PUSHPA.  No depressive for years.  Denies any acute anxiety or panic.  Notes that she feels great.  Stresses:  Chronic pain  Appetite: Denies  Sleep: Denies  Outpatient Provider updates:  [facility], Shaun M, graduate Dbt next week; planning to do peer support after  SI/SIB/HI:  Denies hx or current  MANOLO: Sober ETOH.  THC  Side effects/compliance:  More restless less, tapping;  wanting off lexapro slowly?  Gabapentin was added to day time from RLS doc; helpful  Interventions:  Writer supported DBT completion and success of skills implemented.   Most important: - to come off meds      Progress on Treatment Objective(s) / Homework:  New Objective established this session - CONTEMPLATION (Considering change and yet undecided); Intervened by assessing the negative and positive thinking  (ambivalence) about behavior change    Motivational Interviewing    MI Intervention: Co-Developed Goal: on going mgmt of anxiety and Expressed Empathy/Understanding     Change Talk Expressed by the Patient: Desire to change Ability to change    Provider Response to Change Talk: E - Evoked more info from patient about behavior change and A - Affirmed patient's thoughts, decisions, or attempts at behavior change    Also provided psychoeducation about behavioral health condition, symptoms, and treatment options    Assessments completed prior to visit:  The following assessments were completed by patient for this visit:  PHQ2:       1/2/2024     8:55 AM 9/26/2023    10:21 AM 2/17/2023     6:48 AM 9/13/2022     8:36 AM 10/19/2021     9:06 AM 5/13/2020     8:19 AM 5/3/2019     8:43 AM   PHQ-2 ( 1999 [employer])   Q1: Little interest or pleasure in doing things 0 0 0 0 1 0 0   Q2: Feeling down, depressed or hopeless 0 0 0 0 0 0 0   PHQ-2 Score 0 0 0 0 1 0 0   PHQ-2 Total Score (12-17 Years)- Positive if 3 or more points; Administer PHQ-A if positive     1 0 0   Q1: Little interest or pleasure in doing things   Not at all  Several days  Not at all   Q2: Feeling down, depressed or hopeless   Not at all  Not at all  Not at all   PHQ-2 Score   0  1  0     GAD2:       11/9/2022    10:12 AM 1/5/2023     7:44 AM 6/5/2023     3:32 PM 7/6/2023     9:10 AM 10/6/2023    10:15 AM 1/15/2024     4:31 PM 5/6/2024     8:18 AM   PUSHPA-2   Feeling nervous, anxious, or on edge 3 1 2 1 1 0 1   Not being able to stop or control worrying 0 1 1 1 1 0 0   PUSHPA-2 Total Score 3 2    2 3    3 2    2 2 0    0 1    1       Care Plan review completed: Yes    Medication Review:  Changes to psychiatric medications, see updated Medication List in EPIC.     Medication Compliance:  Yes    Changes in Health Issues:   None reported    Chemical Use Review:   Substance Use: Chemical use reviewed, no active concerns identified      Tobacco Use: No current tobacco use.       Assessment: Current Emotional / Mental Status (status of significant symptoms):  Risk status (Self / Other harm or suicidal ideation)  Patient denies a history of suicidal ideation, suicide attempts, self-injurious behavior, homicidal ideation, homicidal behavior, and and other safety concerns  Patient denies current fears or concerns for personal safety.  Patient denies current or recent suicidal ideation or behaviors.  Patient denies current or recent homicidal ideation or behaviors.  Patient denies current or recent self injurious behavior or ideation.  Patient denies other safety concerns.  A safety and risk management plan has not been developed at this time, however patient was encouraged to call [place] [phone] should there be a change in any of these risk factors.    Appearance:   Appropriate   Eye Contact:   Good   Psychomotor Behavior: Normal   Attitude:   Cooperative   Orientation:   All  Speech   Rate / Production: Normal    Volume:  Normal   Mood:    Normal  Affect:    Appropriate   Thought Content:  Clear   Thought Form:  Coherent  Logical   Insight:    Good     Diagnoses:  1. Generalized anxiety disorder    2. Attention deficit disorder (ADD) without hyperactivity    3. History of major depression    4. History of alcohol dependence (H)        Collateral Reports Completed:  Communicated with: Dr Valentine    Plan: (Homework, other):  Patient was given information about behavioral services and encouraged to schedule a follow up appointment with the clinic [facility] as needed.  She was also given information about mental health symptoms and treatment options .  CD Recommendations: No indications of CD issues.       Cher Byrd [facility]    ______________________________________________________________________    Integrated Primary Care Behavioral Health Treatment Plan    Patient's Name: Zari Francisco  YOB: 1960    Date of Creation: 5/9/24  Date Treatment Plan Last Reviewed/Revised:  5/9/24    DSM5 Diagnoses:   1. Generalized anxiety disorder    2. Attention deficit disorder (ADD) without hyperactivity    3. History of major depression    4. History of alcohol dependence (H)      Psychosocial / Contextual Factors: medical  PROMIS (reviewed every 90 days):  The following assessments were completed by patient for this visit:  PROMIS 10-Global Health (only subscores and total score):       5/14/2022     3:01 PM 9/22/2022    12:06 PM 1/5/2023     7:45 AM 6/5/2023     3:32 PM 10/6/2023    10:23 AM 1/15/2024     4:32 PM 5/6/2024     8:19 AM   PROMIS-10 Scores Only   Global Mental Health Score 17    17 16 16    16 15    15 14 18    18 19    19   Global Physical Health Score 18    18 17 14    14 17    17 19 16    16 16    16   PROMIS TOTAL - SUBSCORES 35    35 33 30    30 32    32 33 34    34 35    35     Referral / Collaboration:  Referral to another professional/service is not indicated at this time..    Anticipated number of session for this episode of care: 5-6  Anticipation frequency of session: Monthly  Anticipated Duration of each session: 16-37 minutes  Treatment plan will be reviewed in 90 days or when goals have been changed.       MeasurableTreatment Goal(s) related to diagnosis / functional impairment(s)  Goal 1: Patient will on going mgmt of anxiety sx    I will know I've met my goal when I con't to access my supports and use my skills.      Objective #A (Patient Action)    Patient will use cognitive strategies identified in therapy to challenge anxious thoughts.  Status: New - Date: 5/9/24      Intervention(s)  Therapist will provide support through CBT, MI, Acceptance and Commitment Therapy, Dialectic Behavioral Therapy and problem solving model to explore and overcome barriers.      Patient has reviewed and agreed to the above plan.      Cher Byrd, [facility]  May 9, 2024

## 2024-05-07 NOTE — PROGRESS NOTES
PHYSICAL THERAPY EVALUATION  Type of Visit: Evaluation    See electronic medical record for Abuse and Falls Screening details.    Subjective       Presenting condition or subjective complaint: Pain in low back, hip, legs and some numbness and weakness in my left leg. Pain has been primarily left sided after originally being right sided last fall. The left leg also feels like it is weakened and tremors at times. She feels the left leg has locked up as well.  Date of onset: 05/02/24 (date of order)    Relevant medical history:   Bell palsy, anxiety, DDD  Dates & types of surgery:  R hand carpal tunnel surgery 2018    Prior diagnostic imaging/testing results: MRI     Prior therapy history for the same diagnosis, illness or injury: Yes PT. Was last seen in the fall for about 10 visits with Grant Fernandes PT.    Living Environment  Social support: With a significant other or spouse   Type of home: House   Stairs to enter the home: Yes 20 Is there a railing: Yes   Ramp: No   Stairs inside the home: Yes 20 Is there a railing: Yes   Help at home: None  Equipment owned:       Employment: Yes   Hobbies/Interests: Hiking, riding my horse, gardening and downhill skiing    Patient goals for therapy: Live without being in pain    Pain assessment: See objective evaluation for additional pain details     Objective     LUMBAR SPINE EVALUATION  PAIN: Pain Level at Rest: 3/10  Pain Level with Use: 8/10  Pain Location: bilateral low back, left sided primarily into left hip, buttocks and posterior thigh region   Pain Quality: Burning and Tingling  Pain Frequency: intermittent  Pain is Exacerbated By: sitting primarily, extending left knee when sitting, transitioning positions, skiing, too much of any one activity  Pain is Relieved By: walking/moving around (hiked 6.5 miles but had a decent amount of pain afterwards once she sat down)  Pain Progression: Unchanged    POSTURE: WNL  BALANCE/PROPRIOCEPTION:  "WNL  ROM:   (Degrees) Left AROM Left PROM  Right AROM Right PROM   Hip Flexion       Hip Extension       Hip Abduction       Hip Adduction       Hip Internal Rotation       Hip External Rotation       Knee Flexion       Knee Extension       Lumbar Side glide Mod loss, L sided pain Min loss, R sided pain   Lumbar Flexion To ankles, x10 FIS no change in symptoms   Lumbar Extension Min loss, x10 EIS no change in symptoms   Pain:   End feel:   PELVIC/SI SCREEN:   STRENGTH:     MYOTOMES:    Left Right   T12-L3 (Hip Flexion) 5 5   L2-4 (Quads)  5 5   L4 (Ankle DF) 5 5   L5 (Great Toe Ext) 4+ 5   S1 (Toe Raise) 3+ 5     DTR S:   CORD SIGNS:   DERMATOMES:   NEURAL TENSION:  possibly + L Slump (L more tight and sore compared to right but not \"painful)  FLEXIBILITY:   LUMBAR/HIP Special Tests:    PELVIS/SI SPECIAL TESTS:   FUNCTIONAL TESTS:   PALPATION:  L QL, ES, glute med  SPINAL SEGMENTAL CONCLUSIONS:  hypomobile L3-5 primarily      Assessment & Plan   CLINICAL IMPRESSIONS  Medical Diagnosis: Lumbar disc herniation with radiculopathy; DDD    Treatment Diagnosis: Chronic bilateral low back pain with left sided radiculopathy   Impression/Assessment: Patient is a 64 year old female with low back complaints.  The following significant findings have been identified: Pain, Decreased ROM/flexibility, Decreased joint mobility, Decreased strength, Impaired muscle performance, Decreased activity tolerance, and Impaired posture. These impairments interfere with their ability to perform self care tasks, work tasks, recreational activities, household chores, driving , household mobility, and community mobility as compared to previous level of function.     Clinical Decision Making (Complexity):  Clinical Presentation: Stable/Uncomplicated  Clinical Presentation Rationale: based on medical and personal factors listed in PT evaluation  Clinical Decision Making (Complexity): Low complexity    PLAN OF CARE  Treatment " Interventions:  Modalities: Cryotherapy, E-stim, Hot Pack, Ultrasound  Interventions: Manual Therapy, Neuromuscular Re-education, Therapeutic Activity, Therapeutic Exercise    Long Term Goals     PT Goal 1  Goal Identifier: LTG 1  Goal Description: Patient will be able to transition from sitting<>standing with 2/10 low back pain at worst over 1 week period  Rationale: to maximize safety and independence with performance of ADLs and functional tasks;to maximize safety and independence within the community;to maximize safety and independence within the home  Target Date: 07/30/24      Frequency of Treatment: 1x/week  Duration of Treatment: 12 weeks    Recommended Referrals to Other Professionals:  none  Education Assessment:   Learner/Method: No Barriers to Learning;Pictures/Video;Demonstration;Reading;Listening;Patient    Risks and benefits of evaluation/treatment have been explained.   Patient/Family/caregiver agrees with Plan of Care.     Evaluation Time:     PT Eval, Low Complexity Minutes (11523): 24     Signing Clinician: Nikolay Cruz, VICTORIANO      [facility]                                                                                   OUTPATIENT PHYSICAL THERAPY      PLAN OF TREATMENT FOR OUTPATIENT REHABILITATION   Patient's Last Name, First Name, Zari Paul YOB: 1960   Provider's Name   [facility]   Medical Record No.  [id number]     Onset Date: 05/02/24 (date of order)  Start of Care Date: 05/07/24     Medical Diagnosis:  Lumbar disc herniation with radiculopathy; DDD      PT Treatment Diagnosis:  Chronic bilateral low back pain with left sided radiculopathy Plan of Treatment  Frequency/Duration: 1x/week/ 12 weeks    Certification date from 05/07/24 to 07/30/24         See note for plan of treatment details and functional goals     Nikolay Cruz, PT                         I CERTIFY THE NEED FOR THESE SERVICES  FURNISHED UNDER        THIS PLAN OF TREATMENT AND WHILE UNDER MY CARE     (Physician attestation of this document indicates review and certification of the therapy plan).              Referring Provider:  Jacky Sweeney    Initial Assessment  See Epic Evaluation- Start of Care Date: 05/07/24

## 2024-05-09 ENCOUNTER — VIRTUAL VISIT (OUTPATIENT)
Dept: BEHAVIORAL HEALTH | Facility: CLINIC | Age: 64
End: 2024-05-09
Payer: COMMERCIAL

## 2024-05-09 ENCOUNTER — VIRTUAL VISIT (OUTPATIENT)
Dept: PSYCHIATRY | Facility: CLINIC | Age: 64
End: 2024-05-09
Payer: COMMERCIAL

## 2024-05-09 ENCOUNTER — THERAPY VISIT (OUTPATIENT)
Dept: PHYSICAL THERAPY | Facility: CLINIC | Age: 64
End: 2024-05-09
Attending: PREVENTIVE MEDICINE
Payer: COMMERCIAL

## 2024-05-09 DIAGNOSIS — F98.8 ATTENTION DEFICIT DISORDER (ADD) WITHOUT HYPERACTIVITY: Primary | ICD-10-CM

## 2024-05-09 DIAGNOSIS — M54.42 CHRONIC BILATERAL LOW BACK PAIN WITH LEFT-SIDED SCIATICA: Primary | ICD-10-CM

## 2024-05-09 DIAGNOSIS — Z86.59 HISTORY OF MAJOR DEPRESSION: ICD-10-CM

## 2024-05-09 DIAGNOSIS — G89.29 CHRONIC BILATERAL LOW BACK PAIN WITH LEFT-SIDED SCIATICA: Primary | ICD-10-CM

## 2024-05-09 DIAGNOSIS — F10.11 ALCOHOL ABUSE, IN REMISSION: ICD-10-CM

## 2024-05-09 DIAGNOSIS — F10.21 HISTORY OF ALCOHOL DEPENDENCE (H): ICD-10-CM

## 2024-05-09 DIAGNOSIS — Z86.59 HX OF MAJOR DEPRESSION: ICD-10-CM

## 2024-05-09 DIAGNOSIS — F41.1 GENERALIZED ANXIETY DISORDER: ICD-10-CM

## 2024-05-09 DIAGNOSIS — F98.8 ATTENTION DEFICIT DISORDER (ADD) WITHOUT HYPERACTIVITY: ICD-10-CM

## 2024-05-09 DIAGNOSIS — F41.1 GENERALIZED ANXIETY DISORDER: Primary | ICD-10-CM

## 2024-05-09 PROCEDURE — 99214 OFFICE O/P EST MOD 30 MIN: CPT | Mod: 95 | Performed by: PSYCHIATRY & NEUROLOGY

## 2024-05-09 PROCEDURE — 97110 THERAPEUTIC EXERCISES: CPT | Mod: GP | Performed by: PHYSICAL THERAPIST

## 2024-05-09 PROCEDURE — 97140 MANUAL THERAPY 1/> REGIONS: CPT | Mod: GP | Performed by: PHYSICAL THERAPIST

## 2024-05-09 PROCEDURE — G2211 COMPLEX E/M VISIT ADD ON: HCPCS | Mod: 95 | Performed by: PSYCHIATRY & NEUROLOGY

## 2024-05-09 PROCEDURE — 90832 PSYTX W PT 30 MINUTES: CPT | Mod: 95 | Performed by: COUNSELOR

## 2024-05-09 RX ORDER — DEXTROAMPHETAMINE SACCHARATE, AMPHETAMINE ASPARTATE MONOHYDRATE, DEXTROAMPHETAMINE SULFATE AND AMPHETAMINE SULFATE 3.75; 3.75; 3.75; 3.75 MG/1; MG/1; MG/1; MG/1
15 CAPSULE, EXTENDED RELEASE ORAL DAILY
Qty: 15 CAPSULE | Refills: 0 | Status: SHIPPED | OUTPATIENT
Start: 2024-06-08 | End: 2024-06-13

## 2024-05-09 RX ORDER — ESCITALOPRAM OXALATE 5 MG/1
5 TABLET ORAL DAILY
Qty: 90 TABLET | Refills: 3 | Status: SHIPPED | OUTPATIENT
Start: 2024-05-09 | End: 2024-06-13

## 2024-05-09 ASSESSMENT — PATIENT HEALTH QUESTIONNAIRE - PHQ9
SUM OF ALL RESPONSES TO PHQ QUESTIONS 1-9: 1
10. IF YOU CHECKED OFF ANY PROBLEMS, HOW DIFFICULT HAVE THESE PROBLEMS MADE IT FOR YOU TO DO YOUR WORK, TAKE CARE OF THINGS AT HOME, OR GET ALONG WITH OTHER PEOPLE: NOT DIFFICULT AT ALL
SUM OF ALL RESPONSES TO PHQ QUESTIONS 1-9: 1

## 2024-05-09 NOTE — PATIENT INSTRUCTIONS
Treatment Plan:  Increase/Change to Adderall XR 15 mg daily as needed for ADHD. Let me know how it is going with this change before next refill.   Continue Lexapro/escitalopram 5 mg daily  for mood and anxiety.   Continue all other cares per primary care provider.   Continue all other medications as reviewed per electronic medical record today.   Safety plan reviewed. To the Emergency Department as needed or call after hours crisis line at [phone] or [phone]. [place] Crisis Text Line. Text [place] to 008853 or Suicide LifeLine Chat: suicidepreventionlifeline.org/chat  Continue individual psychotherapy/DBT for additional support and ongoing development of nonpharmacologic coping skills and strategies.  Schedule an appointment with me in about 6 months or sooner as needed. Call [facility] at [phone] to schedule.  Follow up with primary care provider as planned or for acute medical concerns.  Call the psychiatric nurse line with medication questions or concerns at [phone].  Mobissimohart may be used to communicate with your provider, but this is not intended to be used for emergencies.    Have previously discussed risks of stimulant medication including, but not limited to, decreased appetite, risk of tics (and that they may be lasting), trouble sleeping, cardiac risks such as increased heart rate and blood pressure, and rare risk of sudden cardiac death.  Also risk of addiction/tolerance/dependence.

## 2024-05-09 NOTE — NURSING NOTE
Is the patient currently in the state of [place]? YES    Visit mode:VIDEO    If the visit is dropped, the patient can be reconnected by: VIDEO VISIT: Text to cell phone:   Telephone Information:   Mobile [phone]       Will anyone else be joining the visit? NO  (If patient encounters technical issues they should call [phone])    How would you like to obtain your AVS? MyChart    Are changes needed to the allergy or medication list? Pt stated no changes to allergies and Pt stated no med changes    Are refills needed on medications prescribed by this physician?     Reason for visit: JOSE GOMEZF

## 2024-05-09 NOTE — PROGRESS NOTES
"Virtual Visit Details    Type of service:  Video Visit   Video Start Time: {video visit start/end time for provider to select:521769}  Video End Time:{video visit start/end time for provider to select:795878}    Originating Location (pt. Location): {video visit patient location:276275::\"Home\"}  {PROVIDER LOCATION On-site should be selected for visits conducted from your clinic location or adjoining [facility] hospital, academic office, or other nearby [facility] building. Off-site should be selected for all other provider locations, including home:556851}  Distant Location (provider location):  {virtual location provider:434032}  Platform used for Video Visit: {Virtual Visit Platforms:976546::\"IdenIve\"}  "
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
## 2024-05-16 ENCOUNTER — THERAPY VISIT (OUTPATIENT)
Dept: PHYSICAL THERAPY | Facility: CLINIC | Age: 64
End: 2024-05-16
Attending: PREVENTIVE MEDICINE
Payer: COMMERCIAL

## 2024-05-16 DIAGNOSIS — M54.42 CHRONIC BILATERAL LOW BACK PAIN WITH LEFT-SIDED SCIATICA: Primary | ICD-10-CM

## 2024-05-16 DIAGNOSIS — G89.29 CHRONIC BILATERAL LOW BACK PAIN WITH LEFT-SIDED SCIATICA: Primary | ICD-10-CM

## 2024-05-16 PROCEDURE — 97140 MANUAL THERAPY 1/> REGIONS: CPT | Mod: GP | Performed by: PHYSICAL THERAPIST

## 2024-05-16 PROCEDURE — 97110 THERAPEUTIC EXERCISES: CPT | Mod: GP | Performed by: PHYSICAL THERAPIST

## 2024-05-23 ASSESSMENT — SLEEP AND FATIGUE QUESTIONNAIRES
HOW LIKELY ARE YOU TO NOD OFF OR FALL ASLEEP WHILE SITTING AND TALKING TO SOMEONE: WOULD NEVER DOZE
HOW LIKELY ARE YOU TO NOD OFF OR FALL ASLEEP IN A CAR, WHILE STOPPED FOR A FEW MINUTES IN TRAFFIC: WOULD NEVER DOZE
HOW LIKELY ARE YOU TO NOD OFF OR FALL ASLEEP WHILE SITTING QUIETLY AFTER LUNCH WITHOUT ALCOHOL: WOULD NEVER DOZE
HOW LIKELY ARE YOU TO NOD OFF OR FALL ASLEEP WHILE SITTING AND READING: MODERATE CHANCE OF DOZING
HOW LIKELY ARE YOU TO NOD OFF OR FALL ASLEEP WHEN YOU ARE A PASSENGER IN A CAR FOR AN HOUR WITHOUT A BREAK: WOULD NEVER DOZE
HOW LIKELY ARE YOU TO NOD OFF OR FALL ASLEEP WHILE SITTING INACTIVE IN A PUBLIC PLACE: WOULD NEVER DOZE
HOW LIKELY ARE YOU TO NOD OFF OR FALL ASLEEP WHILE LYING DOWN TO REST IN THE AFTERNOON WHEN CIRCUMSTANCES PERMIT: SLIGHT CHANCE OF DOZING
HOW LIKELY ARE YOU TO NOD OFF OR FALL ASLEEP WHILE WATCHING TV: MODERATE CHANCE OF DOZING

## 2024-05-24 ENCOUNTER — THERAPY VISIT (OUTPATIENT)
Dept: PHYSICAL THERAPY | Facility: CLINIC | Age: 64
End: 2024-05-24
Payer: COMMERCIAL

## 2024-05-24 DIAGNOSIS — G89.29 CHRONIC BILATERAL LOW BACK PAIN WITH LEFT-SIDED SCIATICA: Primary | ICD-10-CM

## 2024-05-24 DIAGNOSIS — M54.42 CHRONIC BILATERAL LOW BACK PAIN WITH LEFT-SIDED SCIATICA: Primary | ICD-10-CM

## 2024-05-24 PROCEDURE — 97110 THERAPEUTIC EXERCISES: CPT | Mod: GP | Performed by: PHYSICAL THERAPIST

## 2024-05-28 ENCOUNTER — VIRTUAL VISIT (OUTPATIENT)
Dept: SLEEP MEDICINE | Facility: CLINIC | Age: 64
End: 2024-05-28
Payer: COMMERCIAL

## 2024-05-28 VITALS — WEIGHT: 140 LBS | BODY MASS INDEX: 22.5 KG/M2 | HEIGHT: 66 IN

## 2024-05-28 DIAGNOSIS — G25.81 RESTLESS LEG SYNDROME: ICD-10-CM

## 2024-05-28 PROCEDURE — 99214 OFFICE O/P EST MOD 30 MIN: CPT | Mod: 95 | Performed by: PHYSICIAN ASSISTANT

## 2024-05-28 RX ORDER — GABAPENTIN 100 MG/1
CAPSULE ORAL
Qty: 270 CAPSULE | Refills: 3 | Status: SHIPPED | OUTPATIENT
Start: 2024-05-28

## 2024-05-28 RX ORDER — ROPINIROLE 0.5 MG/1
0.5 TABLET, FILM COATED ORAL 2 TIMES DAILY
Qty: 90 TABLET | Refills: 1 | Status: SHIPPED | OUTPATIENT
Start: 2024-05-28 | End: 2024-09-05

## 2024-05-28 RX ORDER — GABAPENTIN 300 MG/1
CAPSULE ORAL
Qty: 270 CAPSULE | Refills: 3 | Status: SHIPPED | OUTPATIENT
Start: 2024-05-28

## 2024-05-28 ASSESSMENT — PAIN SCALES - GENERAL: PAINLEVEL: SEVERE PAIN (7)
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
## 2024-05-28 NOTE — NURSING NOTE
Is the patient currently in the state of [place]? YES    Visit mode:VIDEO    If the visit is dropped, the patient can be reconnected by: VIDEO VISIT: Send to e-mail at: [email or web address]    Will anyone else be joining the visit? NO  (If patient encounters technical issues they should call [phone])    How would you like to obtain your AVS? MyChart    Are changes needed to the allergy or medication list? Pt stated no changes to allergies and Pt stated no med changes    Are refills needed on medications prescribed by this physician? NO  Has patient had flu shot for current/most recent flu season? If so, when? Yes: 09/27/2023    Reason for visit: JOSE GOMEZF

## 2024-05-28 NOTE — PATIENT INSTRUCTIONS
Your Body mass index is 22.94 kg/m .  Weight management is a personal decision.  If you are interested in exploring weight loss strategies, the following discussion covers the approaches that may be successful. Body mass index (BMI) is one way to tell whether you are at a healthy weight, overweight, or obese. It measures your weight in relation to your height.  A BMI of 18.5 to 24.9 is in the healthy range. A person with a BMI of 25 to 29.9 is considered overweight, and someone with a BMI of 30 or greater is considered obese. More than two-thirds of American adults are considered overweight or obese.  Being overweight or obese increases the risk for further weight gain. Excess weight may lead to heart disease and diabetes.  Creating and following plans for healthy eating and physical activity may help you improve your health.  Weight control is part of healthy lifestyle and includes exercise, emotional health, and healthy eating habits. Careful eating habits lifelong are the mainstay of weight control. Though there are significant health benefits from weight loss, long-term weight loss with diet alone may be very difficult to achieve- studies show long-term success with dietary management in less than 10% of people. Attaining a healthy weight may be especially difficult to achieve in those with severe obesity. In some cases, medications, devices and surgical management might be considered.  What can you do?  If you are overweight or obese and are interested in methods for weight loss, you should discuss this with your provider.   Consider reducing daily calorie intake by 500 calories.   Keep a food journal.   Avoiding skipping meals, consider cutting portions instead.    Diet combined with exercise helps maintain muscle while optimizing fat loss. Strength training is particularly important for building and maintaining muscle mass. Exercise helps reduce stress, increase energy, and improves fitness. Increasing  exercise without diet control, however, may not burn enough calories to loose weight.     Start walking three days a week 10-20 minutes at a time  Work towards walking thirty minutes five days a week   Eventually, increase the speed of your walking for 1-2 minutes at time    In addition, we recommend that you review healthy lifestyles and methods for weight loss available through the National Institutes of Health patient information sites:  http://win.niddk.nih.gov/publications/index.htm    And look into health and wellness programs that may be available through your health insurance provider, employer, local community center, or [employer].

## 2024-05-31 ENCOUNTER — THERAPY VISIT (OUTPATIENT)
Dept: PHYSICAL THERAPY | Facility: CLINIC | Age: 64
End: 2024-05-31
Payer: COMMERCIAL

## 2024-05-31 DIAGNOSIS — M54.42 CHRONIC BILATERAL LOW BACK PAIN WITH LEFT-SIDED SCIATICA: Primary | ICD-10-CM

## 2024-05-31 DIAGNOSIS — G89.29 CHRONIC BILATERAL LOW BACK PAIN WITH LEFT-SIDED SCIATICA: Primary | ICD-10-CM

## 2024-05-31 PROCEDURE — 97110 THERAPEUTIC EXERCISES: CPT | Mod: GP | Performed by: PHYSICAL THERAPIST

## 2024-05-31 PROCEDURE — 97140 MANUAL THERAPY 1/> REGIONS: CPT | Mod: GP | Performed by: PHYSICAL THERAPIST

## 2024-05-31 PROCEDURE — 97530 THERAPEUTIC ACTIVITIES: CPT | Mod: GP | Performed by: PHYSICAL THERAPIST

## 2024-06-03 NOTE — PROGRESS NOTES
Medication Therapy Management (MTM) Encounter    ASSESSMENT:                            Medication Adherence/Access: No issues identified    RLS: Stable    ADHD/Anxiety: Stable.  Follows closely with psychiatry.     Back Pain: Would benefit from taking etodolac as needed to prevent adverse effects    PLAN:                            Take etodolac just as needed    Follow-up: 6 months    SUBJECTIVE/OBJECTIVE:                          Zari Francisco is a 64 year old female called for a follow up visit. She was referred to me from Susanna Dyer Follow up from 1/2/2024.  .  Reason for visit: RLS    Allergies/ADRs: Reviewed in chart  Past Medical History: Reviewed in chart  Tobacco: She reports that she has quit smoking. Her smoking use included cigarettes. She has never used smokeless tobacco.  Alcohol: history of alcohol dependence; in recovery,  Caffeine: 1/2 caff 1/2 decaff; only in the am    Medication Adherence/Access: Per patient, misses medication 0 times per week.   Medication barriers: none.   The patient fills medications at [place]: NO, fills medications at [facility].  Sets alarm 11am for adderall, 430 for ropinirole and 815 for evening meds    RLS:  gabapentin 100mg at 1pm, 200mg of gabapentin at 430pm, and 900mg at bedtime.  ropinirole 0.5mg at bedtime (1-2 hours before bedtime)-is working on tapering off very slowly. In 6 months will go down to 0.25mg.    Met with sleep medicine. Feels like her legs are finally getting under control. Iron and vitamin c were discontinued.  Oral iron was not increasing her iron  levels enough  and her insurance wouldn't cover an infusion  Now she is on a different insurance so may try to get iron infusion covered.     ADHD/Anxiety:  Adderall XR 15mg daily as needed-doesn't always take on the weekends  escitalopram 5mg daily at night-decreased from 10mg  Magnesium glycinate 400mg at bedime    Adderall was increased from 10 to 15mg because she didn't think the  10mg was working any more. Has not started the 15mg yet. Graduated from [facility] in January. Reports mood has been ok.     Follows with Rani Valentine.      Back Pain:  Etodolac 500mg twice daily as needed-usually takes once a day    Is in physical therapy. Has a big trip to [place] planned. Wondering if she should be taking etodolac twice daily every day.    Today's Vitals: There were no vitals taken for this visit.  ----------------    I spent 24minutes with this patient today. All changes were made via collaborative practice agreement with Susanna Dyer A copy of the visit note was provided to the patient's provider(s).    A summary of these recommendations was sent via [employer].    Nay Francois, Pharm.D, [facility]  Medication Therapy Management Pharmacist    Telemedicine Visit Details  Type of service:  Video Visit  Start Time: 9:02AM  End Time: 8:59AM     Medication Therapy Recommendations  No medication therapy recommendations to display

## 2024-06-04 ENCOUNTER — VIRTUAL VISIT (OUTPATIENT)
Dept: PHARMACY | Facility: CLINIC | Age: 64
End: 2024-06-04
Payer: COMMERCIAL

## 2024-06-04 ENCOUNTER — LAB (OUTPATIENT)
Dept: LAB | Facility: CLINIC | Age: 64
End: 2024-06-04
Payer: COMMERCIAL

## 2024-06-04 DIAGNOSIS — Z13.6 CARDIOVASCULAR SCREENING; LDL GOAL LESS THAN 160: ICD-10-CM

## 2024-06-04 DIAGNOSIS — M54.9 BACK PAIN, UNSPECIFIED BACK LOCATION, UNSPECIFIED BACK PAIN LATERALITY, UNSPECIFIED CHRONICITY: ICD-10-CM

## 2024-06-04 DIAGNOSIS — G25.81 RESTLESS LEG SYNDROME: ICD-10-CM

## 2024-06-04 DIAGNOSIS — Z13.6 CARDIOVASCULAR SCREENING; LDL GOAL LESS THAN 160: Primary | ICD-10-CM

## 2024-06-04 DIAGNOSIS — R73.01 ELEVATED FASTING GLUCOSE: ICD-10-CM

## 2024-06-04 DIAGNOSIS — F98.8 ATTENTION DEFICIT DISORDER (ADD) WITHOUT HYPERACTIVITY: ICD-10-CM

## 2024-06-04 DIAGNOSIS — F41.9 ANXIETY AND DEPRESSION: ICD-10-CM

## 2024-06-04 DIAGNOSIS — Z13.0 SCREENING FOR DEFICIENCY ANEMIA: Primary | ICD-10-CM

## 2024-06-04 DIAGNOSIS — F32.A ANXIETY AND DEPRESSION: ICD-10-CM

## 2024-06-04 LAB
ALBUMIN SERPL BCG-MCNC: 4.2 G/DL (ref 3.5–5.2)
ALP SERPL-CCNC: 98 U/L (ref 40–150)
ALT SERPL W P-5'-P-CCNC: 17 U/L (ref 0–50)
ANION GAP SERPL CALCULATED.3IONS-SCNC: 12 MMOL/L (ref 7–15)
AST SERPL W P-5'-P-CCNC: 24 U/L (ref 0–45)
BILIRUB SERPL-MCNC: 0.7 MG/DL
BUN SERPL-MCNC: 24.1 MG/DL (ref 8–23)
CALCIUM SERPL-MCNC: 9.3 MG/DL (ref 8.8–10.2)
CHLORIDE SERPL-SCNC: 106 MMOL/L (ref 98–107)
CHOLEST SERPL-MCNC: 210 MG/DL
CREAT SERPL-MCNC: 0.69 MG/DL (ref 0.51–0.95)
DEPRECATED HCO3 PLAS-SCNC: 22 MMOL/L (ref 22–29)
EGFRCR SERPLBLD CKD-EPI 2021: >90 ML/MIN/1.73M2
FERRITIN SERPL-MCNC: 88 NG/ML (ref 11–328)
GLUCOSE SERPL-MCNC: 96 MG/DL (ref 70–99)
HDLC SERPL-MCNC: 62 MG/DL
IRON BINDING CAPACITY (ROCHE): 254 UG/DL (ref 240–430)
IRON SATN MFR SERPL: 47 % (ref 15–46)
IRON SERPL-MCNC: 119 UG/DL (ref 37–145)
LDLC SERPL CALC-MCNC: 128 MG/DL
NONHDLC SERPL-MCNC: 148 MG/DL
POTASSIUM SERPL-SCNC: 4.1 MMOL/L (ref 3.4–5.3)
PROT SERPL-MCNC: 6.5 G/DL (ref 6.4–8.3)
SODIUM SERPL-SCNC: 140 MMOL/L (ref 135–145)
TRIGL SERPL-MCNC: 100 MG/DL

## 2024-06-04 PROCEDURE — 36415 COLL VENOUS BLD VENIPUNCTURE: CPT

## 2024-06-04 PROCEDURE — 83550 IRON BINDING TEST: CPT | Mod: GZ

## 2024-06-04 PROCEDURE — 99207 PR NO CHARGE LOS: CPT | Mod: 95 | Performed by: PHARMACIST

## 2024-06-04 PROCEDURE — 83540 ASSAY OF IRON: CPT

## 2024-06-04 PROCEDURE — 82728 ASSAY OF FERRITIN: CPT

## 2024-06-04 PROCEDURE — 80053 COMPREHEN METABOLIC PANEL: CPT

## 2024-06-04 PROCEDURE — 80061 LIPID PANEL: CPT

## 2024-06-04 NOTE — PATIENT INSTRUCTIONS
"Recommendations from today's MTM visit:                                                         Take etodolac just as needed    Follow-up: 6 months    It was great speaking with you today.  I value your experience and would be very thankful for your time in providing feedback in our clinic survey. In the next few days, you may receive an email or text message from [employer] with a link to a survey related to your  clinical pharmacist.\"     To schedule another MTM appointment, please call the clinic directly or you may call the MTM scheduling line at [phone] or toll-free at [phone].     My Clinical Pharmacist's contact information:                                                      Please feel free to contact me with any questions or concerns you have.      Nay Francois, Pharm.D, BCACP  Medication Therapy Management Pharmacist      "

## 2024-06-06 ENCOUNTER — TELEPHONE (OUTPATIENT)
Dept: PHARMACY | Facility: CLINIC | Age: 64
End: 2024-06-06

## 2024-06-06 ENCOUNTER — HOSPITAL ENCOUNTER (OUTPATIENT)
Dept: GENERAL RADIOLOGY | Facility: CLINIC | Age: 64
Discharge: HOME | End: 2024-06-06
Attending: PREVENTIVE MEDICINE
Payer: COMMERCIAL

## 2024-06-06 ENCOUNTER — HOSPITAL ENCOUNTER (OUTPATIENT)
Facility: CLINIC | Age: 64
Discharge: HOME | End: 2024-06-06
Admitting: PHYSICIAN ASSISTANT
Payer: COMMERCIAL

## 2024-06-06 VITALS — SYSTOLIC BLOOD PRESSURE: 132 MMHG | DIASTOLIC BLOOD PRESSURE: 85 MMHG | OXYGEN SATURATION: 98 % | HEART RATE: 68 BPM

## 2024-06-06 VITALS
SYSTOLIC BLOOD PRESSURE: 153 MMHG | OXYGEN SATURATION: 97 % | HEART RATE: 66 BPM | DIASTOLIC BLOOD PRESSURE: 88 MMHG | RESPIRATION RATE: 16 BRPM

## 2024-06-06 DIAGNOSIS — M51.16 LUMBAR DISC HERNIATION WITH RADICULOPATHY: ICD-10-CM

## 2024-06-06 DIAGNOSIS — M51.369 DDD (DEGENERATIVE DISC DISEASE), LUMBAR: ICD-10-CM

## 2024-06-06 PROCEDURE — 250N000009 HC RX 250: Performed by: PHYSICIAN ASSISTANT

## 2024-06-06 PROCEDURE — 62323 NJX INTERLAMINAR LMBR/SAC: CPT

## 2024-06-06 PROCEDURE — 255N000002 HC RX 255 OP 636: Performed by: PHYSICIAN ASSISTANT

## 2024-06-06 PROCEDURE — 999N000154 HC STATISTIC RADIOLOGY XRAY, US, CT, MAR, NM

## 2024-06-06 PROCEDURE — 250N000011 HC RX IP 250 OP 636: Performed by: PHYSICIAN ASSISTANT

## 2024-06-06 RX ORDER — IOPAMIDOL 408 MG/ML
10 INJECTION, SOLUTION INTRATHECAL ONCE
Status: COMPLETED | OUTPATIENT
Start: 2024-06-06 | End: 2024-06-06

## 2024-06-06 RX ORDER — NICOTINE POLACRILEX 4 MG
15-30 LOZENGE BUCCAL
Status: DISCONTINUED | OUTPATIENT
Start: 2024-06-06 | End: 2024-06-07 | Stop reason: HOSPADM

## 2024-06-06 RX ORDER — DEXTROSE MONOHYDRATE 25 G/50ML
25-50 INJECTION, SOLUTION INTRAVENOUS
Status: DISCONTINUED | OUTPATIENT
Start: 2024-06-06 | End: 2024-06-07 | Stop reason: HOSPADM

## 2024-06-06 RX ORDER — LIDOCAINE HYDROCHLORIDE 10 MG/ML
30 INJECTION, SOLUTION EPIDURAL; INFILTRATION; INTRACAUDAL; PERINEURAL ONCE
Status: COMPLETED | OUTPATIENT
Start: 2024-06-06 | End: 2024-06-06

## 2024-06-06 RX ORDER — DEXAMETHASONE SODIUM PHOSPHATE 10 MG/ML
20 INJECTION, SOLUTION INTRAMUSCULAR; INTRAVENOUS ONCE
Status: COMPLETED | OUTPATIENT
Start: 2024-06-06 | End: 2024-06-06

## 2024-06-06 RX ADMIN — DEXAMETHASONE SODIUM PHOSPHATE 20 MG: 10 INJECTION INTRAMUSCULAR; INTRAVENOUS at 09:30

## 2024-06-06 RX ADMIN — LIDOCAINE HYDROCHLORIDE 6 ML: 10 INJECTION, SOLUTION EPIDURAL; INFILTRATION; INTRACAUDAL; PERINEURAL at 09:28

## 2024-06-06 RX ADMIN — IOPAMIDOL 2 ML: 408 INJECTION, SOLUTION INTRATHECAL at 09:30

## 2024-06-06 SDOH — HEALTH STABILITY: PHYSICAL HEALTH: ON AVERAGE, HOW MANY MINUTES DO YOU ENGAGE IN EXERCISE AT THIS LEVEL?: 50 MIN

## 2024-06-06 SDOH — HEALTH STABILITY: PHYSICAL HEALTH: ON AVERAGE, HOW MANY DAYS PER WEEK DO YOU ENGAGE IN MODERATE TO STRENUOUS EXERCISE (LIKE A BRISK WALK)?: 3 DAYS

## 2024-06-06 ASSESSMENT — ACTIVITIES OF DAILY LIVING (ADL)
ADLS_ACUITY_SCORE: 35
ADLS_ACUITY_SCORE: 33

## 2024-06-06 ASSESSMENT — SOCIAL DETERMINANTS OF HEALTH (SDOH): HOW OFTEN DO YOU GET TOGETHER WITH FRIENDS OR RELATIVES?: TWICE A WEEK

## 2024-06-06 NOTE — DISCHARGE INSTRUCTIONS
Steroid Injection Discharge Instructions     After you go home:    You may resume your normal diet.    Care of Puncture Site:    If you have a bandaid on your puncture site, you may remove it the next morning  You may shower tomorrow  No bath tubs, whirlpools or swimming pool for at least 48 hours  Use ice packs as needed for discomfort     Activity:    Minimize your activity today. You may gradually resume your normal activity as tolerated  Avoid vigorous or strenuous activity until your symptoms improve or as directed by your doctor  Do NOT drive a vehicle for a few hours after the injection - or longer if you develop numbness in your arm or leg    Medicines:    You may resume all medications, including blood thinners  Resume your Warfarin/Coumadin at your regular dose today. Follow up with your provider to have your INR rechecked  Resume your Platelet Inhibitors and Aspirin tomorrow at your regular dose  For minor discomfort, you may take Acetaminophen (Tylenol) or Ibuprofen (Advil)    Pain:     You may experience increased or different pain over the next 24-48 hours  For the next 48 hrs - you may use ice packs for discomfort     Call your primary care doctor if:    You have severe pain that does not improve with pain medication  You have chills or a fever greater than 101 F (38 C)  The site is red, swollen, hot or tender  Increase in pain, weakness or numbness  New problems with your bowel or bladder  Any questions or concerns    What to watch for:    It can be normal to have some bruising or slight swelling at the puncture site.   After the procedure, you may have some new weakness or numbness down your arm/leg from the numbing medicine. This should resolve in a few hours.   You may feel some temporary relief from the numbing medicine, but that will wear off within a few hours.  Your symptoms may return to pre procedure level, or can even be worse for the first 1-2 days.  For many people, the steroid begins to  provide some relief within 2-3 days, but it can take up to 2 weeks to obtain the full results.  Some people will get lasting relief from a single injection. Others may require up to 3 injections to get results. If you have more than one steroid injection, they should be given 2 weeks apart.  If you have no improvement in your symptoms after two weeks, please contact the doctor who ordered this procedure to discuss the next steps.  Side effects of your steroid injection are mild and will go away in 2-3 days  Insomnia  Irritability  Flushed face  Water retention  Restlessness  Difficulty sleeping  Increased appetite  Increased blood sugar  If you are diabetic, monitor your blood sugar closely. Contact the provider who manages your diabetes to help you control your blood sugar if needed.    If you have questions or concerns call:                  [facility]iology Dept @ [phone]                                    between 8am-4:30pm Mon-Fri    If you have urgent questions outside of these normal business hours, please contact the [facility] on call doctor @ [phone]    Or you can contact your provider via My Chart

## 2024-06-06 NOTE — PROGRESS NOTES
Care Suites Discharge Nursing Note    Patient Information  Name: Zari Francisco  Age: 64 year old    Discharge Education:  Discharge instructions reviewed: Yes  Additional education/resources provided: NA  Patient/patient representative verbalizes understanding: Yes  Patient discharging on new medications: No  Medication education completed: N/A    Discharge Plans:   Discharge location: home  Discharge ride contacted: Yes  Approximate discharge time: 1003    Discharge Criteria:  Discharge criteria met and vital signs stable: Yes    Patient Belongs:  Patient belongings returned to patient: Yes    Maureen Manzo RN

## 2024-06-06 NOTE — TELEPHONE ENCOUNTER
Left Voicemail (1st Attempt) for the patient to call back and schedule the following:    Appointment type: return  Provider: bhavana dinero  Return date: 12/4/2024  Specialty phone number: [phone]   Additonal Notes: Return in about 6 months (around 12/4/2024)     [facility]   Orthopedics, Podiatry, Sports Medicine, Ent ,Eye , Audiology, Adult Endocrine & Diabetes, Nutrition & Medication Therapy Management Specialties   [facility]

## 2024-06-07 ENCOUNTER — OFFICE VISIT (OUTPATIENT)
Dept: FAMILY MEDICINE | Facility: CLINIC | Age: 64
End: 2024-06-07
Payer: COMMERCIAL

## 2024-06-07 VITALS
SYSTOLIC BLOOD PRESSURE: 130 MMHG | BODY MASS INDEX: 25.34 KG/M2 | TEMPERATURE: 98.8 F | HEIGHT: 65 IN | RESPIRATION RATE: 17 BRPM | HEART RATE: 71 BPM | WEIGHT: 152.1 LBS | DIASTOLIC BLOOD PRESSURE: 75 MMHG | OXYGEN SATURATION: 100 %

## 2024-06-07 DIAGNOSIS — F39 MOOD DISORDER (H): ICD-10-CM

## 2024-06-07 DIAGNOSIS — Z12.31 ENCOUNTER FOR SCREENING MAMMOGRAM FOR MALIGNANT NEOPLASM OF BREAST: ICD-10-CM

## 2024-06-07 DIAGNOSIS — G25.81 RESTLESS LEG SYNDROME: ICD-10-CM

## 2024-06-07 DIAGNOSIS — M51.16 LUMBAR DISC HERNIATION WITH RADICULOPATHY: ICD-10-CM

## 2024-06-07 DIAGNOSIS — Z12.11 COLON CANCER SCREENING: ICD-10-CM

## 2024-06-07 DIAGNOSIS — Z00.00 ENCOUNTER FOR MEDICARE ANNUAL WELLNESS EXAM: Primary | ICD-10-CM

## 2024-06-07 DIAGNOSIS — M85.80 OSTEOPENIA, UNSPECIFIED LOCATION: ICD-10-CM

## 2024-06-07 DIAGNOSIS — N95.2 ATROPHIC VAGINITIS: ICD-10-CM

## 2024-06-07 DIAGNOSIS — F98.8 ATTENTION DEFICIT DISORDER (ADD) WITHOUT HYPERACTIVITY: ICD-10-CM

## 2024-06-07 DIAGNOSIS — D12.6 TUBULAR ADENOMA OF COLON: ICD-10-CM

## 2024-06-07 DIAGNOSIS — R79.9 ELEVATED BUN: ICD-10-CM

## 2024-06-07 PROCEDURE — 99214 OFFICE O/P EST MOD 30 MIN: CPT | Mod: 25 | Performed by: FAMILY MEDICINE

## 2024-06-07 PROCEDURE — G0402 INITIAL PREVENTIVE EXAM: HCPCS | Performed by: FAMILY MEDICINE

## 2024-06-07 RX ORDER — RESPIRATORY SYNCYTIAL VIRUS VACCINE 120MCG/0.5
0.5 KIT INTRAMUSCULAR ONCE
Qty: 1 EACH | Refills: 0 | Status: CANCELLED | OUTPATIENT
Start: 2024-06-07 | End: 2024-06-07

## 2024-06-07 ASSESSMENT — PAIN SCALES - GENERAL: PAINLEVEL: MODERATE PAIN (5)

## 2024-06-07 NOTE — PATIENT INSTRUCTIONS
"Preventive Care Advice   This is general advice we often give to help people stay healthy. Your care team may have specific advice just for you. Please talk to your care team about your own preventive care needs.  Lifestyle  Exercise at least 150 minutes each week (30 minutes a day, 5 days a week).  Do muscle strengthening activities 2 days a week. These help control your weight and prevent disease.  No smoking.  Wear sunscreen to prevent skin cancer.  Have your home tested for radon every 2 to 5 years. Radon is a colorless, odorless gas that can harm your lungs. To learn more, go to www.health.[facility].[place]. and search for \"Radon in Homes.\"  Keep guns unloaded and locked up in a safe place like a safe or gun vault, or, use a gun lock and hide the keys. Always lock away bullets separately. To learn more, visit [facility].[place].gov and search for \"safe gun storage.\"  Nutrition  Eat 5 or more servings of fruits and vegetables each day.  Try wheat bread, brown rice and whole grain pasta (instead of white bread, rice, and pasta).  Get enough calcium and vitamin D. Check the label on foods and aim for 100% of the RDA (recommended daily allowance).  Regular exams  Have a dental exam and cleaning every 6 months.  See your health care team every year to talk about:  Any changes in your health.  Any medicines your care team has prescribed.  Preventive care, family planning, and ways to prevent chronic diseases.  Shots (vaccines)   HPV shots (up to age 26), if you've never had them before.  Hepatitis B shots (up to age 59), if you've never had them before.  COVID-19 shot: Get this shot when it's due.  Flu shot: Get a flu shot every year.  Tetanus shot: Get a tetanus shot every 10 years.  Pneumococcal, hepatitis A, and RSV shots: Ask your care team if you need these based on your risk.  Shingles shot (for age 50 and up).  General health tests  Diabetes screening:  Starting at age 35, Get screened for diabetes at least every 3 years.  If " you are younger than age 35, ask your care team if you should be screened for diabetes.  Cholesterol test: At age 39, start having a cholesterol test every 5 years, or more often if advised.  Bone density scan (DEXA): At age 50, ask your care team if you should have this scan for osteoporosis (brittle bones).  Hepatitis C: Get tested at least once in your life.  Abdominal aortic aneurysm screening: Talk to your doctor about having this screening if you:  Have ever smoked; and  Are biologically male; and  Are between the ages of 65 and 75.  STIs (sexually transmitted infections)  Before age 24: Ask your care team if you should be screened for STIs.  After age 24: Get screened for STIs if you're at risk. You are at risk for STIs (including HIV) if:  You are sexually active with more than one person.  You don't use condoms every time.  You or a partner was diagnosed with a sexually transmitted infection.  If you are at risk for HIV, ask about PrEP medicine to prevent HIV.  Get tested for HIV at least once in your life, whether you are at risk for HIV or not.  Cancer screening tests  Cervical cancer screening: If you have a cervix, begin getting regular cervical cancer screening tests at age 21. Most people who have regular screenings with normal results can stop after age 65. Talk about this with your provider.  Breast cancer scan (mammogram): If you've ever had breasts, begin having regular mammograms starting at age 40. This is a scan to check for breast cancer.  Colon cancer screening: It is important to start screening for colon cancer at age 45.  Have a colonoscopy test every 10 years (or more often if you're at risk) Or, ask your provider about stool tests like a FIT test every year or Cologuard test every 3 years.  To learn more about your testing options, visit: [email or web address].  For help making a decision, visit: [email or web address].  Prostate cancer screening test: If you have a prostate and are age 55  to 69, ask your provider if you would benefit from a yearly prostate cancer screening test.  Lung cancer screening: If you are a current or former smoker age 50 to 80, ask your care team if ongoing lung cancer screenings are right for you.  For informational purposes only. Not to replace the advice of your health care provider. Copyright   2023 [employer]. All rights reserved. Clinically reviewed by the [facility] Transitions Program. [employer] 333766 - REV 04/24.    Preventing Falls: Care Instructions  Injuries and health problems such as trouble walking or poor eyesight can increase your risk of falling. So can some medicines. But there are things you can do to help prevent falls. You can exercise to get stronger. You can also arrange your home to make it safer.    Talk to your doctor about the medicines you take. Ask if any of them increase the risk of falls and whether they can be changed or stopped.   Try to exercise regularly. It can help improve your strength and balance. This can help lower your risk of falling.     Practice fall safety and prevention.    Wear low-heeled shoes that fit well and give your feet good support. Talk to your doctor if you have foot problems that make this hard.  Carry a cellphone or wear a medical alert device that you can use to call for help.  Use stepladders instead of chairs to reach high objects. Don't climb if you're at risk for falls. Ask for help, if needed.  Wear the correct eyeglasses, if you need them.    Make your home safer.    Remove rugs, cords, clutter, and furniture from walkways.  Keep your house well lit. Use night-lights in hallways and bathrooms.  Install and use sturdy handrails on stairways.  Wear nonskid footwear, even inside. Don't walk barefoot or in socks without shoes.    Be safe outside.    Use handrails, curb cuts, and ramps whenever possible.  Keep your hands free by using a shoulder bag or backpack.  Try to walk in well-lit  "areas. Watch out for uneven ground, changes in pavement, and debris.  Be careful in the winter. Walk on the grass or gravel when sidewalks are slippery. Use de-icer on steps and walkways. Add non-slip devices to shoes.    Put grab bars and nonskid mats in your shower or tub and near the toilet. Try to use a shower chair or bath bench when bathing.   Get into a tub or shower by putting in your weaker leg first. Get out with your strong side first. Have a phone or medical alert device in the bathroom with you.   Where can you learn more?  Go to https://www.[employer].net/patiented  Enter G117 in the search box to learn more about \"Preventing Falls: Care Instructions.\"  Current as of: July 17, 2023               Content Version: 14.0    2658-2436 [employer]   Care instructions adapted under license by your healthcare professional. If you have questions about a medical condition or this instruction, always ask your healthcare professional. [employer] disclaims any warranty or liability for your use of this information.      Substance Use Disorder: Care Instructions  Overview     You can improve your life and health by stopping your use of alcohol or drugs. When you don't drink or use drugs, you may feel and sleep better. You may get along better with your family, friends, and coworkers. There are medicines and programs that can help with substance use disorder.  How can you care for yourself at home?  Here are some ways to help you stay sober and prevent relapse.  If you have been given medicine to help keep you sober or reduce your cravings, be sure to take it exactly as prescribed.  Talk to your doctor about programs that can help you stop using drugs or drinking alcohol.  Do not keep alcohol or drugs in your home.  Plan ahead. Think about what you'll say if other people ask you to drink or use drugs. Try not to spend time with people who drink or use drugs.  Use the time and money spent " on drinking or drugs to do something that's important to you.  Preventing a relapse  Have a plan to deal with relapse. Learn to recognize changes in your thinking that lead you to drink or use drugs. Get help before you start to drink or use drugs again.  Try to stay away from situations, friends, or places that may lead you to drink or use drugs.  If you feel the need to drink alcohol or use drugs again, seek help right away. Call a trusted friend or family member. Some people get support from organizations such as Narcotics Anonymous or "[employer]" or from treatment facilities.  If you relapse, get help as soon as you can. Some people make a plan with another person that outlines what they want that person to do for them if they relapse. The plan usually includes how to handle the relapse and who to notify in case of relapse.  Don't give up. Remember that a relapse doesn't mean that you have failed. Use the experience to learn the triggers that lead you to drink or use drugs. Then quit again. Recovery is a lifelong process. Many people have several relapses before they are able to quit for good.  Follow-up care is a key part of your treatment and safety. Be sure to make and go to all appointments, and call your doctor if you are having problems. It's also a good idea to know your test results and keep a list of the medicines you take.  When should you call for help?   Call 911  anytime you think you may need emergency care. For example, call if you or someone else:    Has overdosed or has withdrawal signs. Be sure to tell the emergency workers that you are or someone else is using or trying to quit using drugs. Overdose or withdrawal signs may include:  Losing consciousness.  Seizure.  Seeing or hearing things that aren't there (hallucinations).     Is thinking or talking about suicide or harming others.   Where to get help 24 hours a day, 7 days a week   If you or someone you know talks about suicide,  "self-harm, a mental health crisis, a substance use crisis, or any other kind of emotional distress, get help right away. You can:    Call the Suicide and Crisis Lifeline at 988.     Call 2-638-706-TALK (1-182.758.3567).     Text HOME to 002892 to access the Crisis Text Line.   Consider saving these numbers in your phone.  Go to [employer] for more information or to chat online.  Call your doctor now or seek immediate medical care if:    You are having withdrawal symptoms. These may include nausea or vomiting, sweating, shakiness, and anxiety.   Watch closely for changes in your health, and be sure to contact your doctor if:    You have a relapse.     You need more help or support to stop.   Where can you learn more?  Go to https://www.[employer].COM.net/patiented  Enter H573 in the search box to learn more about \"Substance Use Disorder: Care Instructions.\"  Current as of: November 15, 2023               Content Version: 14.0    [id number] [employer].   Care instructions adapted under license by your healthcare professional. If you have questions about a medical condition or this instruction, always ask your healthcare professional. [employer] disclaims any warranty or liability for your use of this information.      Preventive Care Advice   This is general advice we often give to help people stay healthy. Your care team may have specific advice just for you. Please talk to your care team about your own preventive care needs.  Lifestyle  Exercise at least 150 minutes each week (30 minutes a day, 5 days a week).  Do muscle strengthening activities 2 days a week. These help control your weight and prevent disease.  No smoking.  Wear sunscreen to prevent skin cancer.  Have your home tested for radon every 2 to 5 years. Radon is a colorless, odorless gas that can harm your lungs. To learn more, go to www.health.state.[place] and search for \"Radon in Homes.\"  Keep guns unloaded and locked up in a " "safe place like a safe or gun vault, or, use a gun lock and hide the keys. Always lock away bullets separately. To learn more, visit dps.[place].gov and search for \"safe gun storage.\"  Nutrition  Eat 5 or more servings of fruits and vegetables each day.  Try wheat bread, brown rice and whole grain pasta (instead of white bread, rice, and pasta).  Get enough calcium and vitamin D. Check the label on foods and aim for 100% of the RDA (recommended daily allowance).  Regular exams  Have a dental exam and cleaning every 6 months.  See your health care team every year to talk about:  Any changes in your health.  Any medicines your care team has prescribed.  Preventive care, family planning, and ways to prevent chronic diseases.  Shots (vaccines)   HPV shots (up to age 26), if you've never had them before.  Hepatitis B shots (up to age 59), if you've never had them before.  COVID-19 shot: Get this shot when it's due.  Flu shot: Get a flu shot every year.  Tetanus shot: Get a tetanus shot every 10 years.  Pneumococcal, hepatitis A, and RSV shots: Ask your care team if you need these based on your risk.  Shingles shot (for age 50 and up).  General health tests  Diabetes screening:  Starting at age 35, Get screened for diabetes at least every 3 years.  If you are younger than age 35, ask your care team if you should be screened for diabetes.  Cholesterol test: At age 39, start having a cholesterol test every 5 years, or more often if advised.  Bone density scan (DEXA): At age 50, ask your care team if you should have this scan for osteoporosis (brittle bones).  Hepatitis C: Get tested at least once in your life.  Abdominal aortic aneurysm screening: Talk to your doctor about having this screening if you:  Have ever smoked; and  Are biologically male; and  Are between the ages of 65 and 75.  STIs (sexually transmitted infections)  Before age 24: Ask your care team if you should be screened for STIs.  After age 24: Get screened for " STIs if you're at risk. You are at risk for STIs (including HIV) if:  You are sexually active with more than one person.  You don't use condoms every time.  You or a partner was diagnosed with a sexually transmitted infection.  If you are at risk for HIV, ask about PrEP medicine to prevent HIV.  Get tested for HIV at least once in your life, whether you are at risk for HIV or not.  Cancer screening tests  Cervical cancer screening: If you have a cervix, begin getting regular cervical cancer screening tests at age 21. Most people who have regular screenings with normal results can stop after age 65. Talk about this with your provider.  Breast cancer scan (mammogram): If you've ever had breasts, begin having regular mammograms starting at age 40. This is a scan to check for breast cancer.  Colon cancer screening: It is important to start screening for colon cancer at age 45.  Have a colonoscopy test every 10 years (or more often if you're at risk) Or, ask your provider about stool tests like a FIT test every year or Cologuard test every 3 years.  To learn more about your testing options, visit: www.[employer]/503308.pdf.  For help making a decision, visit: [email or web address].  Prostate cancer screening test: If you have a prostate and are age 55 to 69, ask your provider if you would benefit from a yearly prostate cancer screening test.  Lung cancer screening: If you are a current or former smoker age 50 to 80, ask your care team if ongoing lung cancer screenings are right for you.  For informational purposes only. Not to replace the advice of your health care provider. Copyright   2023 [facility]. All rights reserved. Clinically reviewed by the [facility] Transitions Program. [employer] 750259 - REV 04/24.    Preventing Falls: Care Instructions  Injuries and health problems such as trouble walking or poor eyesight can increase your risk of falling. So can some medicines. But there are things you can  "do to help prevent falls. You can exercise to get stronger. You can also arrange your home to make it safer.    Talk to your doctor about the medicines you take. Ask if any of them increase the risk of falls and whether they can be changed or stopped.   Try to exercise regularly. It can help improve your strength and balance. This can help lower your risk of falling.     Practice fall safety and prevention.    Wear low-heeled shoes that fit well and give your feet good support. Talk to your doctor if you have foot problems that make this hard.  Carry a cellphone or wear a medical alert device that you can use to call for help.  Use stepladders instead of chairs to reach high objects. Don't climb if you're at risk for falls. Ask for help, if needed.  Wear the correct eyeglasses, if you need them.    Make your home safer.    Remove rugs, cords, clutter, and furniture from walkways.  Keep your house well lit. Use night-lights in hallways and bathrooms.  Install and use sturdy handrails on stairways.  Wear nonskid footwear, even inside. Don't walk barefoot or in socks without shoes.    Be safe outside.    Use handrails, curb cuts, and ramps whenever possible.  Keep your hands free by using a shoulder bag or backpack.  Try to walk in well-lit areas. Watch out for uneven ground, changes in pavement, and debris.  Be careful in the winter. Walk on the grass or gravel when sidewalks are slippery. Use de-icer on steps and walkways. Add non-slip devices to shoes.    Put grab bars and nonskid mats in your shower or tub and near the toilet. Try to use a shower chair or bath bench when bathing.   Get into a tub or shower by putting in your weaker leg first. Get out with your strong side first. Have a phone or medical alert device in the bathroom with you.   Where can you learn more?  Go to https://www.[employer].net/patiented  Enter G117 in the search box to learn more about \"Preventing Falls: Care Instructions.\"  Current as of: " July 17, 2023               Content Version: 14.0    1569-0864 [employer].   Care instructions adapted under license by your healthcare professional. If you have questions about a medical condition or this instruction, always ask your healthcare professional. [employer] disclaims any warranty or liability for your use of this information.      Substance Use Disorder: Care Instructions  Overview     You can improve your life and health by stopping your use of alcohol or drugs. When you don't drink or use drugs, you may feel and sleep better. You may get along better with your family, friends, and coworkers. There are medicines and programs that can help with substance use disorder.  How can you care for yourself at home?  Here are some ways to help you stay sober and prevent relapse.  If you have been given medicine to help keep you sober or reduce your cravings, be sure to take it exactly as prescribed.  Talk to your doctor about programs that can help you stop using drugs or drinking alcohol.  Do not keep alcohol or drugs in your home.  Plan ahead. Think about what you'll say if other people ask you to drink or use drugs. Try not to spend time with people who drink or use drugs.  Use the time and money spent on drinking or drugs to do something that's important to you.  Preventing a relapse  Have a plan to deal with relapse. Learn to recognize changes in your thinking that lead you to drink or use drugs. Get help before you start to drink or use drugs again.  Try to stay away from situations, friends, or places that may lead you to drink or use drugs.  If you feel the need to drink alcohol or use drugs again, seek help right away. Call a trusted friend or family member. Some people get support from organizations such as Narcotics Anonymous or [employer] or from treatment facilities.  If you relapse, get help as soon as you can. Some people make a plan with another person that  outlines what they want that person to do for them if they relapse. The plan usually includes how to handle the relapse and who to notify in case of relapse.  Don't give up. Remember that a relapse doesn't mean that you have failed. Use the experience to learn the triggers that lead you to drink or use drugs. Then quit again. Recovery is a lifelong process. Many people have several relapses before they are able to quit for good.  Follow-up care is a key part of your treatment and safety. Be sure to make and go to all appointments, and call your doctor if you are having problems. It's also a good idea to know your test results and keep a list of the medicines you take.  When should you call for help?   Call 911  anytime you think you may need emergency care. For example, call if you or someone else:    Has overdosed or has withdrawal signs. Be sure to tell the emergency workers that you are or someone else is using or trying to quit using drugs. Overdose or withdrawal signs may include:  Losing consciousness.  Seizure.  Seeing or hearing things that aren't there (hallucinations).     Is thinking or talking about suicide or harming others.   Where to get help 24 hours a day, 7 days a week   If you or someone you know talks about suicide, self-harm, a mental health crisis, a substance use crisis, or any other kind of emotional distress, get help right away. You can:    Call the Suicide and Crisis Lifeline at 881.     Call 6-884-930-QRQE (1-454.189.2003).     Text HOME to 397448 to access the Crisis Text Line.   Consider saving these numbers in your phone.  Go to Membersuiteline.org for more information or to chat online.  Call your doctor now or seek immediate medical care if:    You are having withdrawal symptoms. These may include nausea or vomiting, sweating, shakiness, and anxiety.   Watch closely for changes in your health, and be sure to contact your doctor if:    You have a relapse.     You need more help or support  "to stop.   Where can you learn more?  Go to https://www.healthAttracta.net/patiented  Enter H573 in the search box to learn more about \"Substance Use Disorder: Care Instructions.\"  Current as of: November 15, 2023               Content Version: 14.0    8039-8652 [employer].   Care instructions adapted under license by your healthcare professional. If you have questions about a medical condition or this instruction, always ask your healthcare professional. [employer], [employer] disclaims any warranty or liability for your use of this information.      "
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
## 2024-06-10 ENCOUNTER — PATIENT OUTREACH (OUTPATIENT)
Dept: GASTROENTEROLOGY | Facility: CLINIC | Age: 64
End: 2024-06-10
Payer: COMMERCIAL

## 2024-06-12 ENCOUNTER — MYC MEDICAL ADVICE (OUTPATIENT)
Dept: PSYCHIATRY | Facility: CLINIC | Age: 64
End: 2024-06-12
Payer: COMMERCIAL

## 2024-06-12 ENCOUNTER — TRANSFERRED RECORDS (OUTPATIENT)
Dept: HEALTH INFORMATION MANAGEMENT | Facility: CLINIC | Age: 64
End: 2024-06-12
Payer: COMMERCIAL

## 2024-06-12 DIAGNOSIS — Z86.59 HX OF MAJOR DEPRESSION: ICD-10-CM

## 2024-06-12 DIAGNOSIS — F41.1 GENERALIZED ANXIETY DISORDER: ICD-10-CM

## 2024-06-12 DIAGNOSIS — F98.8 ATTENTION DEFICIT DISORDER (ADD) WITHOUT HYPERACTIVITY: ICD-10-CM

## 2024-06-12 NOTE — TELEPHONE ENCOUNTER
Patient is requesting that her lexapro be increased to 10 mg. She is having more outbursts and more angry lately.     She is also requesting that she gets a new prescription for the Adderall ER 15 mg be sent in to  early by 6/19/24 for an 11 day [place] trip.     Last ordered on 6/8/24 [place] #15 caps    [place] 6[place]10/24. (6 days early).     Abigail Rosenbaum RN on 6/12/2024 at 3:03 PM

## 2024-06-13 ENCOUNTER — MYC MEDICAL ADVICE (OUTPATIENT)
Dept: SLEEP MEDICINE | Facility: CLINIC | Age: 64
End: 2024-06-13

## 2024-06-13 RX ORDER — DEXTROAMPHETAMINE SACCHARATE, AMPHETAMINE ASPARTATE MONOHYDRATE, DEXTROAMPHETAMINE SULFATE AND AMPHETAMINE SULFATE 3.75; 3.75; 3.75; 3.75 MG/1; MG/1; MG/1; MG/1
15 CAPSULE, EXTENDED RELEASE ORAL DAILY
Qty: 30 CAPSULE | Refills: 0 | Status: SHIPPED | OUTPATIENT
Start: 2024-06-20

## 2024-06-13 RX ORDER — ESCITALOPRAM OXALATE 10 MG/1
10 TABLET ORAL DAILY
Qty: 90 TABLET | Refills: 3 | Status: SHIPPED | OUTPATIENT
Start: 2024-06-13

## 2024-06-14 ENCOUNTER — THERAPY VISIT (OUTPATIENT)
Dept: PHYSICAL THERAPY | Facility: CLINIC | Age: 64
End: 2024-06-14
Payer: COMMERCIAL

## 2024-06-14 DIAGNOSIS — M54.42 CHRONIC BILATERAL LOW BACK PAIN WITH LEFT-SIDED SCIATICA: Primary | ICD-10-CM

## 2024-06-14 DIAGNOSIS — G89.29 CHRONIC BILATERAL LOW BACK PAIN WITH LEFT-SIDED SCIATICA: Primary | ICD-10-CM

## 2024-06-14 PROCEDURE — 97140 MANUAL THERAPY 1/> REGIONS: CPT | Mod: GP | Performed by: PHYSICAL THERAPIST

## 2024-06-14 PROCEDURE — 97110 THERAPEUTIC EXERCISES: CPT | Mod: GP | Performed by: PHYSICAL THERAPIST

## 2024-06-17 NOTE — TELEPHONE ENCOUNTER
Patient needing an early refill due to going on vacation to [place].  Called pharmacy and they are filling it for her.

## 2024-06-18 ENCOUNTER — MYC MEDICAL ADVICE (OUTPATIENT)
Dept: PSYCHIATRY | Facility: CLINIC | Age: 64
End: 2024-06-18
Payer: COMMERCIAL

## 2024-06-18 NOTE — TELEPHONE ENCOUNTER
Ok for early refill due to travel.  Please let pharmacy know.     Thank you!     Arielle Mahmood, MORA, APRN, FMHNP-BC,

## 2024-06-18 NOTE — TELEPHONE ENCOUNTER
Reason for call:  Medication   If this is a refill request, has the caller requested the refill from the pharmacy already? Yes  Will the patient be using a [facility]? No  Name of the pharmacy and phone number for the current request: [facility] [phone]     Name of the medication requested: Amphetamine-Dextroamphet (Adderall XR)    Other request: Patient wants an update on where Marie zee's team is at about an early refill due to patient going to [place] and is going to run out on her trip.  Patient said she needs to be able to pick it up sometime tomorrow.     Phone number to reach patient:  Cell number on file:    Telephone Information:   Mobile [phone]       Best Time:  Anytime    Can we leave a detailed message on this number?  YES    Travel screening: Not Applicable

## 2024-06-18 NOTE — TELEPHONE ENCOUNTER
MyC message from the pt re D-Ampheatmine refill -  Hi,  I m writing again regarding my refill. The pharmacy will not fill my RX before the 24th. I will be in [place] until July 1.  Talked to the pharmacist and he said if you call them and ok an early refill due to vacation they will refill it early.  I will have to pay out of pocket for it.   I leave Thursday, June 20 and will need to pick it up by Wednesday, June 19.   Sorry for the hassle.       Rn reviewed refill/order history -  Outpatient Medication Detail   Disp Refills Start End MICKY   amphetamine-dextroamphetamine (ADDERALL XR) 15 MG 24 hr capsule 30 capsule 0 6/20/2024 -- No   Sig - Route: Take 1 capsule (15 mg) by mouth daily - Oral   Sent to pharmacy as: Amphetamine-Dextroamphet ER 15 MG Oral Capsule Extended Release 24 Hour (Adderall XR)   Class: E-Prescribe   Earliest Fill Date: 6/17/2024   Order: [id number]   E-Prescribing Status: Receipt confirmed by pharmacy (6/13/2024  8:27 AM CDT)       Pharmacy  [id number] - [place], [place] - [address] AT [address] OF [facility] [address]  ( MA[address]) phone [phone]          [place]  checked[place]   Dextroamp-Amphet Er 15 Mg Cap was last sold on 6/10/2024 for quantity of 15.  Other controlled substance on [place] ?: Yes   If yes, are any new medications? No  Filled  Written  Sold  ID  Drug  QTY  Days  Prescriber  RX #  Dispenser  Refill  Daily Dose*  Pymt Type      06/06/2024 05/09/2024 06/10/2024 1 Dextroamp-Amphet Er 15 Mg Cap 15.00 15 Al Bau [id number] [facility] ([facility]4) 0/0  [place]   05/02/2024 05/02/2024 05/03/2024 1 Gabapentin 300 Mg Capsule 270.00 90 Ab Bas [id number] [facility] ([facility]4) 0/0  [place]   04/19/2024 04/19/2024 04/20/2024 1 Gabapentin 100 Mg Capsule 180.00 90 Ab Bas [id number] [facility] ([facility]4) 0/0  [place]   04/14/2024 01/19/2024 04/20/2024 1 Dextroamp-Amphetamin 10 Mg Tab 30.00 30 Al [facility] [id number] [facility] 033) 0/0  [place]   02/26/2024 01/19/2024 02/27/2024 1 Dextroamp-Amphetamin 10 Mg Tab 30.00 30 Al Serena  [id number] [facility] (0334) 0/0  [place]   01/26/2024 01/16/2024 01/30/2024 1 Gabapentin 300 Mg Capsule 270.00 90 Ab [place]             RN forwarding to Dr. Valentine for review as patient needs approval to  the prescription early d/t traveling to [place], leaving on 6/20/2024 needs to  script tomorrow 6/19/2024, gone through 7/1/2024.    Maryellen Calvo RN on 6/18/2024 at 12:17 PM

## 2024-06-18 NOTE — TELEPHONE ENCOUNTER
Received the following from the pharmacy via [facility] -    See pharmacy message note     Maryellen Calvo RN on 6/18/2024 at 12:55 PM

## 2024-06-19 NOTE — TELEPHONE ENCOUNTER
[id number] - [place], [place] - [address]  [address]) phone [phone], and spoke with the pharmacist, Hillary to let her know that a one-time early refill d/t travel is approved.    RN called the patient at [phone], she did not answer, but had identifiers on her voicemail.  RN LVM indicating the provider approved a one-time early refill d/t travel and the pharmacy has already been notified.     RN also sent the patient a [employer] message.     Maryellen Calvo RN on 6/19/2024 at 9:25 AM

## 2024-07-02 ENCOUNTER — TELEPHONE (OUTPATIENT)
Dept: FAMILY MEDICINE | Facility: CLINIC | Age: 64
End: 2024-07-02
Payer: COMMERCIAL

## 2024-07-02 DIAGNOSIS — R50.9 FEVER, UNSPECIFIED FEVER CAUSE: Primary | ICD-10-CM

## 2024-07-02 DIAGNOSIS — U07.1 INFECTION DUE TO 2019 NOVEL CORONAVIRUS: ICD-10-CM

## 2024-07-02 NOTE — TELEPHONE ENCOUNTER
Patient reports that she was given Paxlovid the AM and the original pharmacy does not have it in stock.  She is asking that it be transferred to the [facility] pharmacy. Patient is aware that this is done.  Questions answered on the Prescription(s) with the previous answers on the original Prescription(s).  Thank you. Amie Atkins R.N.    Original answers:  Notes to Pharmacy: Date of symptom onset: 6/28; Risk criteria met: Yes; Weight >40 kg Yes; Renal fxn: normal;  Drug-Drug interactions reviewed & addressed: Yes

## 2024-07-02 NOTE — TELEPHONE ENCOUNTER
RN COVID TREATMENT VISIT  07/02/24      The patient has been triaged and does not require a higher level of care.    Zari Francisco  64 year old  Current weight? 140    Has the patient been seen by a primary care provider at an [facility] or [facility] within the past two years? Yes.   Have you been in close proximity to/do you have a known exposure to a person with a confirmed case of influenza? No.     General treatment eligibility:  Date of positive COVID test (PCR or at home)?  7/2    Are you or have you been hospitalized for this COVID-19 infection? No.   Have you received monoclonal antibodies or antiviral treatment for COVID-19 since this positive test? No.   Do you have any of the following conditions that place you at risk of being very sick from COVID-19?   - Age 50 years or older  Yes, patient has at least one high risk condition as noted above.     Current COVID symptoms:   - fever or chills  - fatigue  - muscle or body aches  - sore throat  Yes. Patient has at least one symptom as selected.     How many days since symptoms started? 5 days or less. Established patient, 12 years or older weighing at least 88.2 lbs, who has symptoms that started in the past 5 days, has not been hospitalized nor received treatment already, and is at risk for being very sick from COVID-19.     Treatment eligibility by RN:  Are you currently pregnant or nursing? No  Do you have a clinically significant hypersensitivity to nirmatrelvir or ritonavir, or toxic epidermal necrolysis (TEN) or Madison-Martin Syndrome? No  Do you have a history of hepatitis, any hepatic impairment on the Problem List (such as Child-Lerma Class C, cirrhosis, fatty liver disease, alcoholic liver disease), or was the last liver lab (hepatic panel, ALT, AST, ALK Phos, bilirubin) elevated in the past 6 months? No  Do you have any history of severe renal impairment (eGFR < 30mL/min)? No    Is patient eligible to continue? Yes,  patient meets all eligibility requirements for the RN COVID treatment (as denoted by all no responses above).     Current Outpatient Medications   Medication Sig Dispense Refill    amphetamine-dextroamphetamine (ADDERALL XR) 15 MG 24 hr capsule Take 1 capsule (15 mg) by mouth daily 30 capsule 0    conjugated estrogens (PREMARIN) 0.625 MG/GM vaginal cream INSERT 0.5 GRAM INTRAVAGINALLY 2 TIMES A WEEK AS DIRECTED 30 g 3    escitalopram (LEXAPRO) 10 MG tablet Take 1 tablet (10 mg) by mouth daily 90 tablet 3    etodolac (LODINE) 500 MG tablet Take 1 tablet (500 mg) by mouth 2 times daily as needed (low back pain) 60 tablet 0    gabapentin (NEURONTIN) 100 MG capsule Take I capsule ant 2 pm and 2 caps at 430 pm 270 capsule 3    gabapentin (NEURONTIN) 300 MG capsule Take 3 tablets at bedtime 270 capsule 3    MAGNESIUM GLYCINATE PO Take 400 mg by mouth At Bedtime      Omega-3 Fatty Acids (FISH OIL) 1200 MG capsule Take 2,400 mg by mouth daily      rOPINIRole (REQUIP) 0.5 MG tablet Take 1 tablet (0.5 mg) by mouth 2 times daily 90 tablet 1       Medications from List 1 of the standing order (on medications that exclude the use of Paxlovid) that patient is taking: NONE. Is patient taking Marcela's Wort? No  Is patient taking Marcela's Wort or any meds from List 1? No.   Medications from List 2 of the standing order (on meds that provider needs to adjust) that patient is taking: NONE. Is patient on any of the meds from List 2? No.   Medications from List 3 of standing order (on meds that a RN needs to adjust) that patient is taking: NONE. Is patient on any meds from List 3? No.     Paxlovid has an approximate 90% reduction in hospitalization. Paxlovid can possibly cause altered sense of taste, diarrhea (loose, watery stools), high blood pressure, muscle aches.     Would patient like a Paxlovid prescription?   Yes.   Lab Results   Component Value Date    GFRESTIMATED >90 06/04/2024       Was last eGFR reduced? No, eGFR 60 or  greater/ No Result on record. Patient can receive the normal renal function dose. Paxlovid Rx sent to [facility] pharmacy   Pt prefers local [facility] pharmacy. Will call if it is not in stock.    Temporary change to home medications: None    All medication adjustments (holds, etc) were discussed with the patient and patient was asked to repeat back (teachback) their med adjustment.  Did patient understand med adjustment? Yes, patient repeated back and understood correctly.        Reviewed the following instructions with the patient:    Paxlovid (nimatrelvir and ritonavir)    How it works  Two medicines (nirmatrelvir and ritonavir) are taken together. They stop the virus from growing. Less amount of virus is easier for your body to fight.    How to take  Medicine comes in a daily container with both medicine tablets. Take by mouth twice daily (once in the morning, once at night) for 5 days.  The number of tablets to take varies by patient.  Don't chew or break capsules. Swallow whole.    When to take  Take as soon as possible after positive COVID-19 test result, and within 5 days of your first symptoms.    Possible side effects  Can cause altered sense of taste, diarrhea (loose, watery stools), high blood pressure, muscle aches.    Miryam Quintanilla RN

## 2024-08-13 ENCOUNTER — MYC MEDICAL ADVICE (OUTPATIENT)
Dept: SLEEP MEDICINE | Facility: CLINIC | Age: 64
End: 2024-08-13
Payer: COMMERCIAL

## 2024-08-13 ENCOUNTER — MYC MEDICAL ADVICE (OUTPATIENT)
Dept: PSYCHIATRY | Facility: CLINIC | Age: 64
End: 2024-08-13
Payer: COMMERCIAL

## 2024-08-13 DIAGNOSIS — F98.8 ATTENTION DEFICIT DISORDER (ADD) WITHOUT HYPERACTIVITY: ICD-10-CM

## 2024-08-14 RX ORDER — DEXTROAMPHETAMINE SACCHARATE, AMPHETAMINE ASPARTATE MONOHYDRATE, DEXTROAMPHETAMINE SULFATE AND AMPHETAMINE SULFATE 3.75; 3.75; 3.75; 3.75 MG/1; MG/1; MG/1; MG/1
15 CAPSULE, EXTENDED RELEASE ORAL DAILY
Qty: 30 CAPSULE | Refills: 0 | Status: SHIPPED | OUTPATIENT
Start: 2024-09-13 | End: 2024-10-13

## 2024-08-14 RX ORDER — DEXTROAMPHETAMINE SACCHARATE, AMPHETAMINE ASPARTATE MONOHYDRATE, DEXTROAMPHETAMINE SULFATE AND AMPHETAMINE SULFATE 3.75; 3.75; 3.75; 3.75 MG/1; MG/1; MG/1; MG/1
15 CAPSULE, EXTENDED RELEASE ORAL DAILY
Qty: 30 CAPSULE | Refills: 0 | Status: SHIPPED | OUTPATIENT
Start: 2024-10-13 | End: 2024-11-12

## 2024-08-14 RX ORDER — DEXTROAMPHETAMINE SACCHARATE, AMPHETAMINE ASPARTATE MONOHYDRATE, DEXTROAMPHETAMINE SULFATE AND AMPHETAMINE SULFATE 3.75; 3.75; 3.75; 3.75 MG/1; MG/1; MG/1; MG/1
15 CAPSULE, EXTENDED RELEASE ORAL DAILY
Qty: 30 CAPSULE | Refills: 0 | Status: SHIPPED | OUTPATIENT
Start: 2024-08-14 | End: 2024-09-13

## 2024-08-14 RX ORDER — DEXTROAMPHETAMINE SACCHARATE, AMPHETAMINE ASPARTATE MONOHYDRATE, DEXTROAMPHETAMINE SULFATE AND AMPHETAMINE SULFATE 3.75; 3.75; 3.75; 3.75 MG/1; MG/1; MG/1; MG/1
15 CAPSULE, EXTENDED RELEASE ORAL DAILY
Qty: 30 CAPSULE | Refills: 0 | Status: CANCELLED | OUTPATIENT
Start: 2024-08-14

## 2024-08-14 NOTE — TELEPHONE ENCOUNTER
MyC message received -   Hi,  I need a refill for my D-Amphetamine 15mg extended release.   Thank you,  Zari Francisco       Date of Last Office Visit: 5/9/2024  Date of Next Office Visit:  11/4/2024  No shows since last visit: No  More than one patient-initiated cancellation (with reschedule) since last seen in clinic? No    []Medication refilled per  Medication Refill in Ambulatory Care  policy.  [x]Medication unable to be refilled by RN due to criteria not met as indicated below:    []Eligibility: has not had a provider visit within last 6 months   []Supervision: no future appointment; < 7 days before next appointment   []Compliance: no shows; cancellations; lapse in therapy   []Verification: order discrepancy; may need modification...   [] > 30-day supply request   []Advanced refill request: > 7 days before refill date   [x]Controlled medication   []Medication not included in policy   []Review: new med; med adjusted ? 30 days; safety alert; requires lab monitoring...   []Scope of Practice: refill request processed by LPN/MA   []Other:      Medication(s) requested:   -  amphetamine-dextroamphetamine (ADDERALL XR) 15 MG 24 hr capsule   Date last ordered: 6/20/2024  Qty: 30  Refills: 0  Take 1 capsule (15 mg) by mouth daily     Appropriate for refill? Provider to review.      Any Controlled Substance(s)? Yes   [place]  checked? Yes   Dextroamphet-Amphet ER 15 mg cap was last sold on 6/19/2024 for quantity of 30.  Other controlled substance on [place] ?: Yes   If yes, are any new medications? Yes  amphetamine-dextroamphetamine (ADDERALL) 10 MG tablet #30 sold on 7/29/2024 for a script written on 1/19/2024 by Dr. Valentine       Requested medication(s) verified as identical to current order? Yes    Any lapse in adherence to medication(s) greater than 5 days? N/A     Additional action taken? routed encounter to provider for review.      Last visit treatment plan:   5/9/2024:  Patient overall doing quite well.  Has been overall  pretty stable on Lexapro 5 mg daily.  Some recent increase psychosocial stressors but otherwise doing fairly well.  Does feel a bit scattered in the afternoons, not sure if Adderall 10 mg is currently sufficient to treat ADHD symptoms.  We will trial Adderall XR 15 mg daily.  Patient is interested in transitioning from immediate release to extended release for a trial.  No acute safety concerns.  No SI.  No problematic drug or alcohol use.     Medication side effects and alternatives were reviewed. Health promotion activities recommended and reviewed today. All questions addressed. Education and counseling completed regarding risks and benefits of medications and psychotherapy options. Recommend therapy for additional support.      Treatment Plan:  Increase/Change to Adderall XR 15 mg daily as needed for ADHD. Let me know how it is going with this change before next refill.   Continue Lexapro/escitalopram 5 mg daily  for mood and anxiety.   Continue all other cares per primary care provider.   Continue all other medications as reviewed per electronic medical record today.   Safety plan reviewed. To the Emergency Department as needed or call after hours crisis line at [phone] or [phone]. [place] Crisis Text Line. Text [place] to 643900 or Suicide LifeLine Chat: suicidepreventionlifeline.org/chat  Continue individual psychotherapy/DBT for additional support and ongoing development of nonpharmacologic coping skills and strategies.  Schedule an appointment with me in about 6 months or sooner as needed. Call [facility] at [phone] to schedule.  Follow up with primary care provider as planned or for acute medical concerns.  Call the psychiatric nurse line with medication questions or concerns at [phone].  MyChart may be used to communicate with your provider, but this is not intended to be used for emergencies.       Any medication(s) require lab monitoring? No

## 2024-09-05 NOTE — TELEPHONE ENCOUNTER
ROPINIROLE 0.5 MG TABLETS    Last Written Prescription Date:  5/28/2024  Last Fill Quantity: 90,  # refills: 1   Last office visit: Visit date not found ; last virtual visit: 5/28/2024 with prescribing provider:  Cynthia Braxton PA-C   Future Office Visit:  None

## 2024-09-06 RX ORDER — ROPINIROLE 0.5 MG/1
0.5 TABLET, FILM COATED ORAL 2 TIMES DAILY
Qty: 90 TABLET | Refills: 1 | Status: SHIPPED | OUTPATIENT
Start: 2024-09-06

## 2024-09-11 ENCOUNTER — MYC MEDICAL ADVICE (OUTPATIENT)
Dept: SLEEP MEDICINE | Facility: CLINIC | Age: 64
End: 2024-09-11
Payer: COMMERCIAL

## 2024-09-21 ENCOUNTER — NURSE TRIAGE (OUTPATIENT)
Dept: NURSING | Facility: CLINIC | Age: 64
End: 2024-09-21
Payer: COMMERCIAL

## 2024-09-21 NOTE — TELEPHONE ENCOUNTER
Pt calls because pharmacy does not have staff this weekend and wants controlled substance Rx sent to new place. Informed pt of limitations of [facility] handling controlled substances. Suggested speaking with Pharmacy to send over Rx or calling clinic on Monday     Reason for Disposition   Caller requesting a CONTROLLED substance prescription refill (e.g., narcotics, ADHD medicines)    Protocols used: Medication Refill and Renewal Call-A-

## 2024-10-22 ENCOUNTER — MYC MEDICAL ADVICE (OUTPATIENT)
Dept: SLEEP MEDICINE | Facility: CLINIC | Age: 64
End: 2024-10-22
Payer: COMMERCIAL

## 2024-10-22 DIAGNOSIS — G25.81 RESTLESS LEG SYNDROME: ICD-10-CM

## 2024-10-23 ENCOUNTER — TRANSFERRED RECORDS (OUTPATIENT)
Dept: HEALTH INFORMATION MANAGEMENT | Facility: CLINIC | Age: 64
End: 2024-10-23
Payer: COMMERCIAL

## 2024-10-25 RX ORDER — GABAPENTIN 300 MG/1
CAPSULE ORAL
Qty: 270 CAPSULE | Refills: 3 | Status: SHIPPED | OUTPATIENT
Start: 2024-10-25

## 2024-11-04 ENCOUNTER — VIRTUAL VISIT (OUTPATIENT)
Dept: PSYCHIATRY | Facility: CLINIC | Age: 64
End: 2024-11-04
Payer: COMMERCIAL

## 2024-11-04 DIAGNOSIS — F17.200 TOBACCO DEPENDENCE: ICD-10-CM

## 2024-11-04 DIAGNOSIS — Z86.59 HX OF MAJOR DEPRESSION: ICD-10-CM

## 2024-11-04 DIAGNOSIS — F10.11 ALCOHOL ABUSE, IN REMISSION: ICD-10-CM

## 2024-11-04 DIAGNOSIS — F98.8 ATTENTION DEFICIT DISORDER (ADD) WITHOUT HYPERACTIVITY: ICD-10-CM

## 2024-11-04 DIAGNOSIS — F41.1 GENERALIZED ANXIETY DISORDER: Primary | ICD-10-CM

## 2024-11-04 PROCEDURE — G2211 COMPLEX E/M VISIT ADD ON: HCPCS | Mod: 95 | Performed by: PSYCHIATRY & NEUROLOGY

## 2024-11-04 PROCEDURE — 99214 OFFICE O/P EST MOD 30 MIN: CPT | Mod: 95 | Performed by: PSYCHIATRY & NEUROLOGY

## 2024-11-04 RX ORDER — DEXTROAMPHETAMINE SACCHARATE, AMPHETAMINE ASPARTATE MONOHYDRATE, DEXTROAMPHETAMINE SULFATE AND AMPHETAMINE SULFATE 3.75; 3.75; 3.75; 3.75 MG/1; MG/1; MG/1; MG/1
15 CAPSULE, EXTENDED RELEASE ORAL DAILY
Qty: 30 CAPSULE | Refills: 0 | Status: SHIPPED | OUTPATIENT
Start: 2024-12-04 | End: 2025-01-03

## 2024-11-04 RX ORDER — DEXTROAMPHETAMINE SACCHARATE, AMPHETAMINE ASPARTATE MONOHYDRATE, DEXTROAMPHETAMINE SULFATE AND AMPHETAMINE SULFATE 3.75; 3.75; 3.75; 3.75 MG/1; MG/1; MG/1; MG/1
15 CAPSULE, EXTENDED RELEASE ORAL DAILY
Qty: 30 CAPSULE | Refills: 0 | Status: SHIPPED | OUTPATIENT
Start: 2024-11-04 | End: 2024-12-04

## 2024-11-04 RX ORDER — POLYETHYLENE GLYCOL 3350 17 G
2 POWDER IN PACKET (EA) ORAL
Qty: 168 LOZENGE | Refills: 1 | Status: SHIPPED | OUTPATIENT
Start: 2024-11-04

## 2024-11-04 RX ORDER — DEXTROAMPHETAMINE SACCHARATE, AMPHETAMINE ASPARTATE MONOHYDRATE, DEXTROAMPHETAMINE SULFATE AND AMPHETAMINE SULFATE 3.75; 3.75; 3.75; 3.75 MG/1; MG/1; MG/1; MG/1
15 CAPSULE, EXTENDED RELEASE ORAL DAILY
Qty: 30 CAPSULE | Refills: 0 | Status: SHIPPED | OUTPATIENT
Start: 2025-01-03 | End: 2025-02-02

## 2024-11-04 RX ORDER — ESCITALOPRAM OXALATE 10 MG/1
10 TABLET ORAL DAILY
Qty: 90 TABLET | Refills: 3 | Status: CANCELLED | OUTPATIENT
Start: 2024-11-04

## 2024-11-04 ASSESSMENT — PAIN SCALES - GENERAL: PAINLEVEL_OUTOF10: NO PAIN (0)

## 2024-11-04 NOTE — PATIENT INSTRUCTIONS
Treatment Plan:  Continue Adderall XR 15 mg daily as needed for ADHD.   Continue Lexapro/escitalopram 10 mg daily  for mood and anxiety.   Continue all other cares per primary care provider.   Continue all other medications as reviewed per electronic medical record today.   Safety plan reviewed. To the Emergency Department as needed or call after hours crisis line at [phone] or [phone]. [place] Crisis Text Line. Text [place] to 071411 or Suicide LifeLine Chat: suicidepreventionlifeline.org/chat  Continue psychotherapy as needed for additional support and ongoing development of nonpharmacologic coping skills and strategies.  Schedule an appointment with me in about 6 months or sooner as needed. Call [facility] at [phone] to schedule.  Follow up with primary care provider as planned or for acute medical concerns.  Call the psychiatric nurse line with medication questions or concerns at [phone].  [employer] may be used to communicate with your provider, but this is not intended to be used for emergencies.    Have previously discussed risks of stimulant medication including, but not limited to, decreased appetite, risk of tics (and that they may be lasting), trouble sleeping, cardiac risks such as increased heart rate and blood pressure, and rare risk of sudden cardiac death.  Also risk of addiction/tolerance/dependence.    Patient Education   Collaborative Care Psychiatry Service  What to Expect  Here's what to expect from your Collaborative Care Psychiatry Service (CCPS).   About CCPS  CCPS means 2 people work together to help you get better. You'll meet with a behavioral health clinician and a psychiatric doctor. A behavioral health clinician helps people with mental health problems by talking with them. A psychiatric doctor helps people by giving them medicine.  How it works  At every visit, you'll see the behavioral health clinician (BHC) first. They'll talk with you about how you're  "doing and teach you how to feel better.   Then you'll see the psychiatric doctor. This doctor can help you deal with troubling thoughts and feelings by giving you medicine. They'll make sure you know the plan for your care.   CCPS usually takes 3 to 6 visits. If you need more visits, we may have you start seeing a different psychiatric doctor for ongoing care.  If you have any questions or concerns, we'll be glad to talk with you.  About visits  Be open  At your visits, please talk openly about your problems. It may feel hard, but it's the best way for us to help you.  Cancelling visits  If you can't come to your visit, please call us right away at [phone]. If you don't cancel at least 24 hours (1 full day) before your visit, that's \"late cancellation.\"  Being late to visits  Being very late is the same as not showing up. You will be a \"no show\" if:  Your appointment starts with a [facility], and you're more than 15 minutes late for a 30-minute (half hour) visit. This will also cancel your appointment with the psychiatric doctor.  Your appointment is with a psychiatric doctor only, and you're more than 15 minutes late for a 30-minute (half hour) visit.  Your appointment is with a psychiatric doctor only, and you're more than 30 minutes late for a 60-minute (full hour) visit.  If you cancel late or don't show up 2 times within 6 months, we may end your care.   Getting help between visits  If you need help between visits, you can call us Monday to Friday from 8 a.m. to 4:30 p.m. at [phone].  Emergency care  Call 911 or go to the nearest emergency department if your life or someone else's life is in danger.  Call 988 anytime to reach the national Suicide and Crisis hotline.  Medicine refills  To refill your medicine, call your pharmacy. You can also call [facility]'s Behavioral Access at [phone], Monday to Friday, 8 a.m. to 4:30 p.m. It can take 1 to 3 business days to get a refill.   Forms, " letters, and tests  You may have papers to fill out, like FMLA, short-term disability, and workability. We can help you with these forms at your visits, but you must have an appointment. You may need more than 1 visit for this, to be in an intensive therapy program, or both.  Before we can give you medicine for ADHD, we may refer you to get tested for it or confirm it another way.  We may not be able to give you an emotional support animal letter.  We don't do mental health checks ordered by the court.   We don't do mental health testing, but we can refer you to get tested.   Thank you for choosing us for your care.  For informational purposes only. Not to replace the advice of your health care provider. Copyright   2022 [facility]. All rights reserved. [employer] [id number] - 12/22.
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
## 2024-11-04 NOTE — NURSING NOTE
Is the patient currently in the state of [place]? YES    Current patient location:  At work.    Visit mode:VIDEO    If the visit is dropped, the patient can be reconnected by: VIDEO VISIT: Text to cell phone:   Telephone Information:   Mobile [phone]       Will anyone else be joining the visit? No  (If patient encounters technical issues they should call [phone])    Are changes needed to the allergy or medication list? Pt stated no changes to allergies and Pt stated no med changes    Are refills needed on medications prescribed by this physician? Yes    Rooming Documentation: Questionnaire(s) completed.    Reason for visit: JOSE No, PATRICIAF        
Yes

## 2024-11-04 NOTE — PROGRESS NOTES
"Virtual Visit Details    Type of service:  Video Visit   Video Start Time: {video visit start/end time for provider to select:449059}  Video End Time:{video visit start/end time for provider to select:193980}    Originating Location (pt. Location): {video visit patient location:719998::\"Home\"}  {PROVIDER LOCATION On-site should be selected for visits conducted from your clinic location or adjoining [facility] hospital, academic office, or other nearby [facility] building. Off-site should be selected for all other provider locations, including home:325085}  Distant Location (provider location):  {virtual location provider:250643}  Platform used for Video Visit: {Virtual Visit Platforms:614950::\"dooyoo\"}  "

## 2024-12-10 ENCOUNTER — MYC MEDICAL ADVICE (OUTPATIENT)
Dept: SLEEP MEDICINE | Facility: CLINIC | Age: 64
End: 2024-12-10
Payer: COMMERCIAL

## 2024-12-13 ENCOUNTER — MYC MEDICAL ADVICE (OUTPATIENT)
Dept: SLEEP MEDICINE | Facility: CLINIC | Age: 64
End: 2024-12-13
Payer: COMMERCIAL

## 2024-12-13 NOTE — TELEPHONE ENCOUNTER
Zari need her gabapentin will be out soon need ASAP, has been reaching out trying to get refill need filled today.

## 2024-12-16 ENCOUNTER — MYC MEDICAL ADVICE (OUTPATIENT)
Dept: SLEEP MEDICINE | Facility: CLINIC | Age: 64
End: 2024-12-16
Payer: COMMERCIAL

## 2024-12-16 NOTE — PROGRESS NOTES
Medication Therapy Management (MTM) Encounter    ASSESSMENT:                            Medication Adherence/Access: No issues identified    RLS: May benefit from increasing gabapentin dose. Will reach out to sleep medicine. In the meantime, refill for current gabapentin dose was sent to patient's pharmacy.      PLAN:                            Could consider increasing gabapentin to 400mg early afternoon, 400mg late afternoon and 900mg at bedtime.     Follow-up: after hearing back from sleep medicine.    SUBJECTIVE/OBJECTIVE:                          Zari Francisco is a 64 year old female seen for a follow up visit. She was referred to me from Susanna Dyer Follow up from 6/4/2024.  .  Reason for visit: RLS    Allergies/ADRs: Reviewed in chart  Past Medical History: Reviewed in chart  Tobacco: She reports that she has been smoking cigarettes. She has never used smokeless tobacco.  Alcohol: history of alcohol dependence; in recovery,  Caffeine: 1/2 caff 1/2 decaff; only in the am    Medication Adherence/Access: Per patient, misses medication 0 times per week.   Medication barriers: none.   The patient fills medications at [place]: NO, fills medications at [facility].  Sets alarm 11am for adderall, 430 for ropinirole and 815 for evening meds    RLS:  gabapentin 300mg at 1pm, 300mg of gabapentin at 430pm, and 900mg at bedtime.  ropinirole 0.5mg at bedtime (1-2 hours before bedtime)-is working on tapering off very slowly. In 6 months will go down to 0.25mg.    Is having a hard time getting her gabapentin refilled. Has reached out to the office several times and has not gotten a response. [employer] message 9/11/24 gabapentin dosage was confirmed to be 300mg early afternoon, 300mg late afternoon and 900mg at bedtime. The next appointment she could get was in March. Feels like her legs are worse and would like to increase her gabapentin dose and continue tapering ropinirole. Her gabapentin dose at bedtime is  working well for her. Has a harder time in the afternoon. She can't sit for more than 20 minutes when trying to read a book or if she is tired in the afternoon her legs are worse.   Denies side effects.     Today's Vitals: There were no vitals taken for this visit.  ----------------    I spent 16 minutes with this patient today. All changes were made via collaborative practice agreement with Susanna Dyer A copy of the visit note was provided to the patient's provider(s).    A summary of these recommendations was sent via [employer].    Nay Francois, Pharm.D, [facility]  Medication Therapy Management Pharmacist      Telemedicine Visit Details  The patient's medications can be safely assessed via a telemedicine encounter.  Type of service:  Video Conference via [employer]  Originating Location (pt. Location): Home    Distant Location (provider location):  On-site  Start Time:  8:30AM  End Time: 8:46 AM     Medication Therapy Recommendations  Restless leg syndrome   1 Current Medication: gabapentin (NEURONTIN) 300 MG capsule   Current Medication Sig: Take 300mg at 1:30pm and 300mg at 4pm and 900mg at bedtime   Rationale: Dose too low - Dosage too low - Effectiveness   Recommendation: Increase Dose   Status: Contact Provider - Awaiting Response   Identified Date: 12/17/2024

## 2024-12-17 ENCOUNTER — VIRTUAL VISIT (OUTPATIENT)
Dept: PHARMACY | Facility: CLINIC | Age: 64
End: 2024-12-17
Payer: COMMERCIAL

## 2024-12-17 DIAGNOSIS — G25.81 RESTLESS LEG SYNDROME: ICD-10-CM

## 2024-12-17 PROCEDURE — 99207 PR NO CHARGE LOS: CPT | Mod: 95 | Performed by: PHARMACIST

## 2024-12-17 RX ORDER — GABAPENTIN 300 MG/1
CAPSULE ORAL
Qty: 150 CAPSULE | Refills: 2 | Status: SHIPPED | OUTPATIENT
Start: 2024-12-17

## 2024-12-17 NOTE — PATIENT INSTRUCTIONS
"Recommendations from today's MTM visit:                                                         Could consider increasing gabapentin to 400mg early afternoon, 400mg late afternoon and 900mg at bedtime. Will reach out to sleep medicine.    Follow-up: after hearing back from sleep medicine    It was great speaking with you today.  I value your experience and would be very thankful for your time in providing feedback in our clinic survey. In the next few days, you may receive an email or text message from [employer] with a link to a survey related to your  clinical pharmacist.\"     To schedule another MTM appointment, please call the clinic directly or you may call the MTM scheduling line at [phone] or toll-free at [phone].     My Clinical Pharmacist's contact information:                                                      Please feel free to contact me with any questions or concerns you have.      Nay Francois, Pharm.D, [facility]  Medication Therapy Management Pharmacist      "

## 2024-12-17 NOTE — PROGRESS NOTES
Yes, the suggested adjustment to Zari's gabapentin regimen is reasonable. I will reach out to her.   Thanks   Cynthia

## 2024-12-19 ENCOUNTER — PATIENT OUTREACH (OUTPATIENT)
Dept: CARE COORDINATION | Facility: CLINIC | Age: 64
End: 2024-12-19
Payer: COMMERCIAL

## 2025-01-16 ENCOUNTER — OFFICE VISIT (OUTPATIENT)
Dept: ORTHOPEDICS | Facility: CLINIC | Age: 65
End: 2025-01-16
Payer: COMMERCIAL

## 2025-01-16 VITALS — BODY MASS INDEX: 25.33 KG/M2 | HEIGHT: 65 IN | WEIGHT: 152 LBS

## 2025-01-16 DIAGNOSIS — M54.16 LUMBAR RADICULAR PAIN: ICD-10-CM

## 2025-01-16 DIAGNOSIS — M51.362 DEGENERATION OF INTERVERTEBRAL DISC OF LUMBAR REGION WITH DISCOGENIC BACK PAIN AND LOWER EXTREMITY PAIN: Primary | ICD-10-CM

## 2025-01-16 DIAGNOSIS — M51.16 LUMBAR DISC HERNIATION WITH RADICULOPATHY: ICD-10-CM

## 2025-01-16 RX ORDER — METHOCARBAMOL 500 MG/1
500 TABLET, FILM COATED ORAL 3 TIMES DAILY PRN
Qty: 60 TABLET | Refills: 1 | Status: SHIPPED | OUTPATIENT
Start: 2025-01-16

## 2025-01-16 RX ORDER — ETODOLAC 500 MG/1
500 TABLET, EXTENDED RELEASE ORAL DAILY
Qty: 60 TABLET | Refills: 1 | Status: SHIPPED | OUTPATIENT
Start: 2025-01-16
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
## 2025-01-21 ENCOUNTER — HOSPITAL ENCOUNTER (OUTPATIENT)
Facility: CLINIC | Age: 65
Discharge: HOME | End: 2025-01-21
Admitting: PHYSICIAN ASSISTANT
Payer: COMMERCIAL

## 2025-01-21 ENCOUNTER — HOSPITAL ENCOUNTER (OUTPATIENT)
Dept: GENERAL RADIOLOGY | Facility: CLINIC | Age: 65
Discharge: HOME | End: 2025-01-21
Attending: PREVENTIVE MEDICINE
Payer: COMMERCIAL

## 2025-01-21 VITALS
HEART RATE: 98 BPM | SYSTOLIC BLOOD PRESSURE: 116 MMHG | DIASTOLIC BLOOD PRESSURE: 67 MMHG | OXYGEN SATURATION: 98 % | RESPIRATION RATE: 16 BRPM

## 2025-01-21 VITALS — SYSTOLIC BLOOD PRESSURE: 148 MMHG | DIASTOLIC BLOOD PRESSURE: 90 MMHG | OXYGEN SATURATION: 97 % | HEART RATE: 73 BPM

## 2025-01-21 DIAGNOSIS — M51.362 DEGENERATION OF INTERVERTEBRAL DISC OF LUMBAR REGION WITH DISCOGENIC BACK PAIN AND LOWER EXTREMITY PAIN: ICD-10-CM

## 2025-01-21 DIAGNOSIS — M51.16 LUMBAR DISC HERNIATION WITH RADICULOPATHY: ICD-10-CM

## 2025-01-21 PROCEDURE — 250N000009 HC RX 250: Performed by: PREVENTIVE MEDICINE

## 2025-01-21 PROCEDURE — 250N000011 HC RX IP 250 OP 636: Performed by: PREVENTIVE MEDICINE

## 2025-01-21 PROCEDURE — 255N000002 HC RX 255 OP 636: Performed by: PREVENTIVE MEDICINE

## 2025-01-21 PROCEDURE — 62323 NJX INTERLAMINAR LMBR/SAC: CPT

## 2025-01-21 RX ORDER — LIDOCAINE 40 MG/G
CREAM TOPICAL
Status: CANCELLED | OUTPATIENT
Start: 2025-01-21

## 2025-01-21 RX ORDER — DEXAMETHASONE SODIUM PHOSPHATE 10 MG/ML
20 INJECTION, SOLUTION INTRAMUSCULAR; INTRAVENOUS ONCE
Status: COMPLETED | OUTPATIENT
Start: 2025-01-21 | End: 2025-01-21

## 2025-01-21 RX ORDER — LIDOCAINE HYDROCHLORIDE 10 MG/ML
30 INJECTION, SOLUTION EPIDURAL; INFILTRATION; INTRACAUDAL; PERINEURAL ONCE
Status: COMPLETED | OUTPATIENT
Start: 2025-01-21 | End: 2025-01-21

## 2025-01-21 RX ORDER — LIDOCAINE 40 MG/G
CREAM TOPICAL
Status: DISCONTINUED | OUTPATIENT
Start: 2025-01-21 | End: 2025-01-21 | Stop reason: HOSPADM

## 2025-01-21 RX ORDER — NICOTINE POLACRILEX 4 MG
15-30 LOZENGE BUCCAL
Status: DISCONTINUED | OUTPATIENT
Start: 2025-01-21 | End: 2025-01-22 | Stop reason: HOSPADM

## 2025-01-21 RX ORDER — IOPAMIDOL 408 MG/ML
10 INJECTION, SOLUTION INTRATHECAL ONCE
Status: COMPLETED | OUTPATIENT
Start: 2025-01-21 | End: 2025-01-21

## 2025-01-21 RX ORDER — DEXTROSE MONOHYDRATE 25 G/50ML
25-50 INJECTION, SOLUTION INTRAVENOUS
Status: DISCONTINUED | OUTPATIENT
Start: 2025-01-21 | End: 2025-01-22 | Stop reason: HOSPADM

## 2025-01-21 RX ADMIN — DEXAMETHASONE SODIUM PHOSPHATE 20 MG: 10 INJECTION, SOLUTION INTRAMUSCULAR; INTRAVENOUS at 08:46

## 2025-01-21 RX ADMIN — IOPAMIDOL 2.5 ML: 408 INJECTION, SOLUTION INTRATHECAL at 08:48

## 2025-01-21 RX ADMIN — LIDOCAINE HYDROCHLORIDE 4 ML: 10 INJECTION, SOLUTION EPIDURAL; INFILTRATION; INTRACAUDAL; PERINEURAL at 08:51

## 2025-01-21 ASSESSMENT — ACTIVITIES OF DAILY LIVING (ADL)
ADLS_ACUITY_SCORE: 41
ADLS_ACUITY_SCORE: 41

## 2025-01-21 NOTE — DISCHARGE INSTRUCTIONS
Steroid Injection Discharge Instructions     After you go home:    You may resume your normal diet.    Care of Puncture Site:    If you have a bandaid on your puncture site, you may remove it the next morning  You may shower tomorrow  No bath tubs, whirlpools or swimming pool for at least 48 hours  Use ice packs as needed for discomfort     Activity:    Minimize your activity today. You may gradually resume your normal activity as tolerated  Avoid vigorous or strenuous activity until your symptoms improve or as directed by your doctor  Do NOT drive a vehicle for a few hours after the injection - or longer if you develop numbness in your arm or leg    Medicines:    You may resume all medications, including blood thinners  Resume your Warfarin/Coumadin at your regular dose today. Follow up with your provider to have your INR rechecked  Resume your Platelet Inhibitors and Aspirin tomorrow at your regular dose  For minor discomfort, you may take Acetaminophen (Tylenol) or Ibuprofen (Advil)    Pain:     You may experience increased or different pain over the next 24-48 hours  For the next 48 hrs - you may use ice packs for discomfort     Call your primary care doctor if:    You have severe pain that does not improve with pain medication  You have chills or a fever greater than 101 F (38 C)  The site is red, swollen, hot or tender  Increase in pain, weakness or numbness  New problems with your bowel or bladder  Any questions or concerns    What to watch for:    It can be normal to have some bruising or slight swelling at the puncture site.   After the procedure, you may have some new weakness or numbness down your arm/leg from the numbing medicine. This should resolve in a few hours.   You may feel some temporary relief from the numbing medicine, but that will wear off within a few hours.  Your symptoms may return to pre procedure level, or can even be worse for the first 1-2 days.  For many people, the steroid begins to  provide some relief within 2-3 days, but it can take up to 2 weeks to obtain the full results.  Some people will get lasting relief from a single injection. Others may require up to 3 injections to get results. If you have more than one steroid injection, they should be given 2 weeks apart.  If you have no improvement in your symptoms after two weeks, please contact the doctor who ordered this procedure to discuss the next steps.  Side effects of your steroid injection are mild and will go away in 2-3 days  Insomnia  Irritability  Flushed face  Water retention  Restlessness  Difficulty sleeping  Increased appetite  Increased blood sugar  If you are diabetic, monitor your blood sugar closely. Contact the provider who manages your diabetes to help you control your blood sugar if needed.    If you have questions or concerns call:                  [facility] Radiology Dept @ [phone]                                    between 8am-4:30pm Mon-Fri    If you have urgent questions outside of these normal business hours, please contact the [facility] on call doctor @ [phone]    Or you can contact your provider via My Chart

## 2025-01-21 NOTE — PROGRESS NOTES
0855 Pt returned from radiology. Bandaid CDI to left lower back. Area soft & flat. Pt denies pain at this time. Pt on flat bedrest. Pt denies numbness or tingling to BLE. Pt resting quietly. No complaints.    0930 OOB - steady on feet. Ambulates w/o difficulty. Bandaid CDI to puncture site.    0935 Pt discharged per w/c to private vehicle. All personal belongings taken with pt.

## 2025-01-21 NOTE — PROCEDURES
RADIOLOGY PROCEDURE NOTE  Patient name: Zari Francisco  MRN: [id number]  : 1960    Pre-procedure diagnosis: Back and left leg pain  Post-procedure diagnosis: Same    Procedure Date/Time: 2025  9:04 AM  Procedure: Left L4-L5 TFESI  Estimated blood loss: None  Specimen(s) collected with description: none  The patient tolerated the procedure well with no immediate complications.  Significant findings:none    See imaging dictation for procedural details.    Provider name: Tommie Stover PA-C  Assistant(s):None

## 2025-01-28 RX ORDER — ROPINIROLE 0.5 MG/1
0.5 TABLET, FILM COATED ORAL 2 TIMES DAILY
Qty: 90 TABLET | Refills: 1 | Status: SHIPPED | OUTPATIENT
Start: 2025-01-28

## 2025-01-28 NOTE — TELEPHONE ENCOUNTER
Ropinirole 0.5 MG Tablets      Last Written Prescription Date:  9/6/2024  Last Fill Quantity: 90,   # refills: 1  Last Office Visit: 5/28/2024  Future Office visit: 3/12/2025 karen Braxton.  Edie Vogel, CMA

## 2025-01-28 NOTE — TELEPHONE ENCOUNTER
Pending Prescriptions:                       Disp   Refills    rOPINIRole (REQUIP) 0.5 MG tablet         90 tab*1            Sig: Take 1 tablet (0.5 mg) by mouth 2 times daily.          Last Written Prescription Date: 9/6/24  Last Fill Quantity: 90   # refills: 1  Last Office Visit: 5/28/24  Future Office visit:  3/12/25    Routing refill request to provider for review/approval because:  Drug not on the [facility], [facility] or [facility] refill protocol or controlled substance

## 2025-01-29 ENCOUNTER — MYC MEDICAL ADVICE (OUTPATIENT)
Dept: PSYCHIATRY | Facility: CLINIC | Age: 65
End: 2025-01-29
Payer: COMMERCIAL

## 2025-01-29 DIAGNOSIS — F41.1 GENERALIZED ANXIETY DISORDER: Primary | ICD-10-CM

## 2025-01-29 DIAGNOSIS — F33.1 MODERATE EPISODE OF RECURRENT MAJOR DEPRESSIVE DISORDER (H): ICD-10-CM

## 2025-01-30 RX ORDER — DULOXETIN HYDROCHLORIDE 20 MG/1
CAPSULE, DELAYED RELEASE ORAL
Qty: 115 CAPSULE | Refills: 0 | Status: SHIPPED | OUTPATIENT
Start: 2025-01-30 | End: 2025-03-31

## 2025-01-30 NOTE — TELEPHONE ENCOUNTER
"1) Reviewed patient's [employer] message:  Dr Valentine,  I ve really been down, unlike any other time I can remember. Weird thoughts pop into my head about suicide. I catch myself saying what the heck I don t want to commit suicide. Like I said it s extremely weird. I ve only had this happen one other time and I believe it was on Wellbutrin. I know I ve been on just for a while Lexapro.. I ve spent the last six days on my sofa, binge watching Netflix and not going to work. I haven t called in and canceled on a job in 10 years or more. Is it possible to talk to you or get an appointment with you sooner rather than later? I think I need a med change.     2) Called and spoke with the patient. She reports that 3 weeks ago she noticed that her mood has been down. Having low energy, not wanting to get up from the couch, binge watching Netflix. Not interested in doing things she enjoys or doing things she's supposed to do, like getting the mail or taking care of her horse. Has called in sick to work twice the past few weeks and hasn't called in sick to work in 10 years. She's been binge eating carbs and sugar. She's also had \"bizarre thoughts\" - the word \"suicide\" just out of no where pops into her head. States \"I just don't know why this word pops into my head. I would never do anything like that.\" States that she has everything in the world to live for. Denies plan and intent. Has been taking Lexapro 10mg daily for over a year.     Recently going through some stressors - lots of financial issues, worried about losing her house, 's business is unraveling, her son is going through a very difficult divorce. Didn't picture her life to turn out this way at 64 years old.     3) She has some questions for Dr. Valentine  - I think I need a med change. Lexapro was very helpful when she first started it. Is not having side effects  - She thinks that what has worked best for her was Duloxetine. She stopped it because it was thought to " be making her restless leg syndrome worse. When she stopped it, she didn't notice any changes in RLS.   - Had the suicidal thoughts in the past when she took Wellbutrin, is the SI a side effect of Lexapro? Has otherwise never had SI.     4) Discussed [facility] as an option for the patient. She was interested in learning about it, but didn't feel like she needed it right now.     5) Ok to communicate via Phone or MyChart.       ANGELICA STRINGER RN on 1/30/2025 at 9:06 AM

## 2025-02-11 ENCOUNTER — MYC MEDICAL ADVICE (OUTPATIENT)
Dept: SLEEP MEDICINE | Facility: CLINIC | Age: 65
End: 2025-02-11
Payer: COMMERCIAL

## 2025-02-12 ENCOUNTER — ANCILLARY PROCEDURE (OUTPATIENT)
Dept: MAMMOGRAPHY | Facility: CLINIC | Age: 65
End: 2025-02-12
Attending: FAMILY MEDICINE
Payer: COMMERCIAL

## 2025-02-12 DIAGNOSIS — Z12.31 VISIT FOR SCREENING MAMMOGRAM: ICD-10-CM

## 2025-02-13 ENCOUNTER — MYC MEDICAL ADVICE (OUTPATIENT)
Dept: PSYCHIATRY | Facility: CLINIC | Age: 65
End: 2025-02-13
Payer: COMMERCIAL

## 2025-02-13 DIAGNOSIS — F33.1 MODERATE EPISODE OF RECURRENT MAJOR DEPRESSIVE DISORDER (H): Primary | ICD-10-CM

## 2025-02-13 RX ORDER — ESCITALOPRAM OXALATE 20 MG/1
20 TABLET ORAL DAILY
Qty: 30 TABLET | Refills: 0 | Status: SHIPPED | OUTPATIENT
Start: 2025-02-13

## 2025-02-13 NOTE — TELEPHONE ENCOUNTER
Date of Last Office Visit: 11/4/24  Date of Next Office Visit: None; routing for A to assist pt with scheduling.    No shows since last visit: No  More than one patient-initiated cancellation (with reschedule) since last seen in clinic? No    []Medication refilled per  Medication Refill in Ambulatory Care  policy.  [x]Medication unable to be refilled by RN due to criteria not met as indicated below:    []Eligibility: has not had a provider visit within last 6 months   [x]Supervision: no future appointment; < 7 days before next appointment   []Compliance: no shows; cancellations; lapse in therapy   []Verification: order discrepancy; may need modification...   [] > 30-day supply request   []Advanced refill request: > 7 days before refill date   []Controlled medication   []Medication not included in policy   [x]Review: new med; med adjusted <= 30 days; safety alert; requires lab monitoring...   []Scope of Practice: refill request processed by LPN/MA   []Other:      Medication(s) requested:     -  escitalopram (LEXAPRO) 10 MG tablet   Date last ordered: 6/13/24  Qty: 90  Refills: 3  Appropriate for refill? Provider to review. New dose     Any Controlled Substance(s)? No      Requested medication(s) verified as identical to current order? No: Increased to 20 mg on  1/30/25    Any lapse in adherence to medication(s) greater than 5 days? No      Additional action taken? cued up medication/order(s) and routed encounter to provider for review.      Last visit treatment plan:   See encounter from 1/29/25. . It does sound like depression and so I would recommend either increasing the Lexapro (up to 20 mg daily), OR, keep Lexapro the same for now and start duloxetine again. I sent a Rx for the duloxetine to take 20 mg daily for 5 days then can increase to 20 mg twice daily (or 40 mg at once) as tolerated in th ecase you want to tsart the duloxetine   Continue Adderall XR 15 mg daily as needed for ADHD.   Continue  Lexapro/escitalopram 10 mg daily  for mood and anxiety.   Schedule an appointment with me in about 6 months or sooner as needed.     Any medication(s) require lab monitoring? No    Abigail Rosenbaum RN on 2/13/2025 at 2:09 PM

## 2025-02-21 DIAGNOSIS — G25.81 RESTLESS LEG SYNDROME: ICD-10-CM

## 2025-02-21 NOTE — TELEPHONE ENCOUNTER
GABAPENTIN 300 MG CAPSULES    Last Written Prescription Date:  12/17/2024  Last Fill Quantity: 150,  # refills: 2   Last office visit: Visit date not found ; last virtual visit: 5/28/2024 with prescribing provider:  Cynthia Braxton PA-C   Future Office Visit: none

## 2025-02-24 RX ORDER — GABAPENTIN 300 MG/1
CAPSULE ORAL
Qty: 150 CAPSULE | Refills: 2 | Status: SHIPPED | OUTPATIENT
Start: 2025-03-20

## 2025-02-24 NOTE — TELEPHONE ENCOUNTER
Pending Prescriptions:                       Disp   Refills    gabapentin (NEURONTIN) 300 MG capsule     150 ca*2            Sig: Take 300mg at 1:30pm and 300mg at 4pm and 900mg           at bedtime        Last Written Prescription Date:  12/20/24  Last Fill Quantity: 150   # refills: 2  Last Office Visit: 5/28/24  Future Office visit: 3/12/25    Routing refill request to provider for review/approval because:  Drug not on the [facility], [facility] or [facility] refill protocol or controlled substance

## 2025-03-05 ENCOUNTER — VIRTUAL VISIT (OUTPATIENT)
Dept: ORTHOPEDICS | Facility: CLINIC | Age: 65
End: 2025-03-05
Attending: PREVENTIVE MEDICINE
Payer: COMMERCIAL

## 2025-03-05 DIAGNOSIS — M51.362 DEGENERATION OF INTERVERTEBRAL DISC OF LUMBAR REGION WITH DISCOGENIC BACK PAIN AND LOWER EXTREMITY PAIN: Primary | ICD-10-CM

## 2025-03-05 DIAGNOSIS — M54.16 LUMBAR RADICULAR PAIN: ICD-10-CM

## 2025-03-05 DIAGNOSIS — M51.16 LUMBAR DISC HERNIATION WITH RADICULOPATHY: ICD-10-CM

## 2025-03-05 DIAGNOSIS — M48.061 LUMBAR FORAMINAL STENOSIS: ICD-10-CM

## 2025-03-05 PROCEDURE — 98013 SYNCH AUDIO-ONLY EST LOW 20: CPT | Performed by: PREVENTIVE MEDICINE

## 2025-03-05 NOTE — LETTER
3/5/2025      Zari Francisco  [address]  [place] [address]      Dear Colleague,    Thank you for referring your patient, Zari Francisco, to the [facility] [place]. Please see a copy of my visit note below.    Zari is a 64 year old who is being evaluated via a billable telephone visit.    How would you like to obtain your AVS? MyChart  If the video visit is dropped, the invitation should be resent by email  Will anyone else be joining your video visit? No  Originating Location (pt. Location): Home    Distant Location (provider location):  Off-site  Telephone visit completed due to the video connection was lost and majority of visit was completed via audio-only. Patient was connected, provider unable to connect.  Phone call duration: 20 minutes  Patient is a  64   year old who is being evaluated via a billable telephone visit.      What phone number would you like to be contacted at? CELL  How would you like to obtain your AVS? MYCHART        Subjective   Patient is a   64  year old who presents by phone call visit for the following:     HPI   Zari reports that her symptoms have improved since epidural injection  But have not resolved/improved as much as her previous YENNIFER treatments  She still has some low back pain and some radiating pain into hip  Wondering what next to do      Review of Systems   Constitutional, HEENT, cardiovascular, pulmonary, gi and gu systems are negative, except as otherwise noted.      Objective           Vitals:  No vitals were obtained today due to virtual visit.    Physical Exam   healthy, alert, and no distress  PSYCH: Alert and oriented times 3; coherent speech, normal   rate and volume, able to articulate logical thoughts, able   to abstract reason, no tangential thoughts, no hallucinations   or delusions  His affect is normal  RESP: No cough, no audible wheezing, able to talk in full sentences  Remainder of exam unable to be completed due to telephone  visits    Assessment/Plan  63 yo female with lumbar pain, radicular pain, due to lumbar ddd, foraminal stenosis, improved, not resolved    I independently reviewed the following imaging studies : :  Lumbar MRI shows ddd, disc herniations, foraminal stenosis  Discussed consider injection again, however recommended lumbar MRI for further evaluation as it has been over a year since previous imaging and this injection although has provided over 70% improvement in her symptoms, has not resolved more symptoms as previous ESIs have  Cont. HEP  Followup after lumbar MRI and will discuss considering YENNIFER will order for scheduling now which she can schedule anytime AFTER 4/21/25        Phone call duration: 20 minutes  Phone call start: 830am  Phone call end: 850am  Dr Sweeney        Again, thank you for allowing me to participate in the care of your patient.        Sincerely,        Jacky Sweeney MD    Electronically signed

## 2025-03-05 NOTE — LETTER
3/5/2025      Zari Francisco  [address]  [place] [address]      Dear Colleague,    Thank you for referring your patient, Zari Francisco, to the [facility] [place]. Please see a copy of my visit note below.    Zari is a 64 year old who is being evaluated via a billable telephone visit.    How would you like to obtain your AVS? MyChart  If the video visit is dropped, the invitation should be resent by email  Will anyone else be joining your video visit? No  Originating Location (pt. Location): Home    Distant Location (provider location):  Off-site  Telephone visit completed due to the video connection was lost and majority of visit was completed via audio-only. Patient was connected, provider unable to connect.  Phone call duration: 20 minutes  Patient is a  64   year old who is being evaluated via a billable telephone visit.      What phone number would you like to be contacted at? CELL  How would you like to obtain your AVS? MYCHART        Subjective   Patient is a   64  year old who presents by phone call visit for the following:     HPI   Zari reports that her symptoms have improved since epidural injection  But have not resolved/improved as much as her previous YENNIFER treatments  She still has some low back pain and some radiating pain into hip  Wondering what next to do      Review of Systems   Constitutional, HEENT, cardiovascular, pulmonary, gi and gu systems are negative, except as otherwise noted.      Objective           Vitals:  No vitals were obtained today due to virtual visit.    Physical Exam   healthy, alert, and no distress  PSYCH: Alert and oriented times 3; coherent speech, normal   rate and volume, able to articulate logical thoughts, able   to abstract reason, no tangential thoughts, no hallucinations   or delusions  His affect is normal  RESP: No cough, no audible wheezing, able to talk in full sentences  Remainder of exam unable to be completed due to telephone  visits    Assessment/Plan  65 yo female with lumbar pain, radicular pain, due to lumbar ddd, foraminal stenosis, improved, not resolved    I independently reviewed the following imaging studies : :  Lumbar MRI shows ddd, disc herniations, foraminal stenosis  Discussed consider injection again, however recommended lumbar MRI for further evaluation as it has been over a year since previous imaging and this injection although has provided over 70% improvement in her symptoms, has not resolved more symptoms as previous ESIs have  Cont. HEP  Followup after lumbar MRI and will discuss considering YENNIFER will order for scheduling now which she can schedule anytime AFTER 4/21/25        Phone call duration: 20 minutes  Phone call start: 830am  Phone call end: 850am  Dr Sweeney      Again, thank you for allowing me to participate in the care of your patient.        Sincerely,        Jacky Sweeney MD    Electronically signed

## 2025-03-05 NOTE — PROGRESS NOTES
Zari is a 64 year old who is being evaluated via a billable telephone visit.    How would you like to obtain your AVS? MyChart  If the video visit is dropped, the invitation should be resent by email  Will anyone else be joining your video visit? No  Originating Location (pt. Location): Home    Distant Location (provider location):  Off-site  Telephone visit completed due to the video connection was lost and majority of visit was completed via audio-only. Patient was connected, provider unable to connect.  Phone call duration: 20 minutes  Patient is a  64   year old who is being evaluated via a billable telephone visit.      What phone number would you like to be contacted at? CELL  How would you like to obtain your AVS? MYCHART        Subjective   Patient is a   64  year old who presents by phone call visit for the following:     HPI   Zari reports that her symptoms have improved since epidural injection  But have not resolved/improved as much as her previous YENNIFER treatments  She still has some low back pain and some radiating pain into hip  Wondering what next to do      Review of Systems   Constitutional, HEENT, cardiovascular, pulmonary, gi and gu systems are negative, except as otherwise noted.      Objective           Vitals:  No vitals were obtained today due to virtual visit.    Physical Exam   healthy, alert, and no distress  PSYCH: Alert and oriented times 3; coherent speech, normal   rate and volume, able to articulate logical thoughts, able   to abstract reason, no tangential thoughts, no hallucinations   or delusions  His affect is normal  RESP: No cough, no audible wheezing, able to talk in full sentences  Remainder of exam unable to be completed due to telephone visits    Assessment/Plan  65 yo female with lumbar pain, radicular pain, due to lumbar ddd, foraminal stenosis, improved, not resolved    I independently reviewed the following imaging studies : :  Lumbar MRI shows ddd, disc herniations,  foraminal stenosis  Discussed consider injection again, however recommended lumbar MRI for further evaluation as it has been over a year since previous imaging and this injection although has provided over 70% improvement in her symptoms, has not resolved more symptoms as previous ESIs have  Cont. HEP  Followup after lumbar MRI and will discuss considering YENNIFER will order for scheduling now which she can schedule anytime AFTER 4/21/25        Phone call duration: 20 minutes  Phone call start: 830am  Phone call end: 850am  Dr wSeeney

## 2025-03-09 ASSESSMENT — SLEEP AND FATIGUE QUESTIONNAIRES
HOW LIKELY ARE YOU TO NOD OFF OR FALL ASLEEP WHILE SITTING QUIETLY AFTER LUNCH WITHOUT ALCOHOL: WOULD NEVER DOZE
HOW LIKELY ARE YOU TO NOD OFF OR FALL ASLEEP WHILE SITTING AND READING: MODERATE CHANCE OF DOZING
HOW LIKELY ARE YOU TO NOD OFF OR FALL ASLEEP WHILE SITTING INACTIVE IN A PUBLIC PLACE: WOULD NEVER DOZE
HOW LIKELY ARE YOU TO NOD OFF OR FALL ASLEEP WHILE WATCHING TV: SLIGHT CHANCE OF DOZING
HOW LIKELY ARE YOU TO NOD OFF OR FALL ASLEEP WHEN YOU ARE A PASSENGER IN A CAR FOR AN HOUR WITHOUT A BREAK: SLIGHT CHANCE OF DOZING
HOW LIKELY ARE YOU TO NOD OFF OR FALL ASLEEP IN A CAR, WHILE STOPPED FOR A FEW MINUTES IN TRAFFIC: WOULD NEVER DOZE
HOW LIKELY ARE YOU TO NOD OFF OR FALL ASLEEP WHILE LYING DOWN TO REST IN THE AFTERNOON WHEN CIRCUMSTANCES PERMIT: SLIGHT CHANCE OF DOZING
HOW LIKELY ARE YOU TO NOD OFF OR FALL ASLEEP WHILE SITTING AND TALKING TO SOMEONE: WOULD NEVER DOZE

## 2025-03-12 ENCOUNTER — VIRTUAL VISIT (OUTPATIENT)
Dept: SLEEP MEDICINE | Facility: CLINIC | Age: 65
End: 2025-03-12
Payer: COMMERCIAL

## 2025-03-12 VITALS — WEIGHT: 140 LBS | HEIGHT: 66 IN | BODY MASS INDEX: 22.5 KG/M2

## 2025-03-12 DIAGNOSIS — G25.81 RESTLESS LEG SYNDROME: Primary | ICD-10-CM

## 2025-03-12 PROCEDURE — 98006 SYNCH AUDIO-VIDEO EST MOD 30: CPT | Performed by: PHYSICIAN ASSISTANT

## 2025-03-12 PROCEDURE — 1125F AMNT PAIN NOTED PAIN PRSNT: CPT | Mod: 95 | Performed by: PHYSICIAN ASSISTANT

## 2025-03-12 ASSESSMENT — PAIN SCALES - GENERAL: PAINLEVEL_OUTOF10: SEVERE PAIN (7)

## 2025-03-12 NOTE — PATIENT INSTRUCTIONS
Medication Information:  --Be alert for side effects such as compulsive behaviors (gambling, excessive shopping, eating, inappropriate sexual behavior, other compulsive behavior disruptive to self or others), dark moles on skin sudden episodes of falling asleep or other side effects.  --Reports worsened symptoms of RLS.  --This medication should not be discontinued abruptly and should be tapered over a 2-3 week period.  --This medication can be taken with or without food.  --This medication is not safe in pregnancy or while breast feeding.  If pregnancy occurs your should contact your provider and medication should be discontinued.    Conservative Measures for RLS (Restless Leg Syndrome):  Patients with restless leg syndrome (RLS) may be helped by nonpharmacologic approaches in addition to prescription medications.  Good sleep habits (sleep hygiene) may be helpful to patients with RLS.      Patients should avoid the following:  Extremes of sustained inactivity or excessive exercising.  However, moderate, regular exercise is recommended.  Antihistamines: include those found in most allergy, cold, and sinus preparations    Avoid late in the afternoon and evening:  Caffeine  Tobacco  Alcohol    Patients should try:  Mild stretching exercises at bedtime  Baths with water at extreme temperatures (either hot or cold)  Pastimes that promote mental activity, such as puzzles, video/computer, games, etc .     Your Body mass index is 22.94 kg/m .  Weight management is a personal decision.  If you are interested in exploring weight loss strategies, the following discussion covers the approaches that may be successful. Body mass index (BMI) is one way to tell whether you are at a healthy weight, overweight, or obese. It measures your weight in relation to your height.  A BMI of 18.5 to 24.9 is in the healthy range. A person with a BMI of 25 to 29.9 is considered overweight, and someone with a BMI of 30 or greater is considered  obese. More than two-thirds of American adults are considered overweight or obese.  Being overweight or obese increases the risk for further weight gain. Excess weight may lead to heart disease and diabetes.  Creating and following plans for healthy eating and physical activity may help you improve your health.  Weight control is part of healthy lifestyle and includes exercise, emotional health, and healthy eating habits. Careful eating habits lifelong are the mainstay of weight control. Though there are significant health benefits from weight loss, long-term weight loss with diet alone may be very difficult to achieve- studies show long-term success with dietary management in less than 10% of people. Attaining a healthy weight may be especially difficult to achieve in those with severe obesity. In some cases, medications, devices and surgical management might be considered.  What can you do?  If you are overweight or obese and are interested in methods for weight loss, you should discuss this with your provider.   Consider reducing daily calorie intake by 500 calories.   Keep a food journal.   Avoiding skipping meals, consider cutting portions instead.    Diet combined with exercise helps maintain muscle while optimizing fat loss. Strength training is particularly important for building and maintaining muscle mass. Exercise helps reduce stress, increase energy, and improves fitness. Increasing exercise without diet control, however, may not burn enough calories to loose weight.     Start walking three days a week 10-20 minutes at a time  Work towards walking thirty minutes five days a week   Eventually, increase the speed of your walking for 1-2 minutes at time    In addition, we recommend that you review healthy lifestyles and methods for weight loss available through the National [employer] of Health patient information sites:  http://win.niddk.nih.gov/publications/index.htm    And look into health and wellness  programs that may be available through your health insurance provider, employer, local community center, or [employer].
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
## 2025-03-12 NOTE — NURSING NOTE
Current patient location: [facility] [place] [address]    Is the patient currently in the state of [place]? YES    Visit mode: VIDEO    If the visit is dropped, the patient can be reconnected by:VIDEO VISIT: Send to e-mail at: [email or web address]    Will anyone else be joining the visit? NO  (If patient encounters technical issues they should call [phone]    Are changes needed to the allergy or medication list? Yes Pt would like to discuss the gabapentin    Are refills needed on medications prescribed by this physician? Discuss with provider    Rooming Documentation:  Questionnaire(s) completed    Pt reports pain in wrist because of carpel tunnel, pain score 7  Pt reports pain in lower back, pain score 4    Reason for visit: RECHECK    Joanne GOMEZF

## 2025-03-17 ENCOUNTER — ANCILLARY PROCEDURE (OUTPATIENT)
Dept: MAMMOGRAPHY | Facility: CLINIC | Age: 65
End: 2025-03-17
Attending: FAMILY MEDICINE
Payer: COMMERCIAL

## 2025-03-17 DIAGNOSIS — Z12.31 VISIT FOR SCREENING MAMMOGRAM: ICD-10-CM

## 2025-03-17 PROCEDURE — 77063 BREAST TOMOSYNTHESIS BI: CPT | Mod: GC | Performed by: STUDENT IN AN ORGANIZED HEALTH CARE EDUCATION/TRAINING PROGRAM

## 2025-03-17 PROCEDURE — 77067 SCR MAMMO BI INCL CAD: CPT | Mod: GC | Performed by: STUDENT IN AN ORGANIZED HEALTH CARE EDUCATION/TRAINING PROGRAM

## 2025-03-18 NOTE — PROGRESS NOTES
Medication Therapy Management (MTM) Encounter    ASSESSMENT:                            Medication Adherence/Access: No issues identified    RLS:   Stable    Anxiety and ADHD  Stable    Supplements  Stable    Back Pain  Stable    Tobacco Use Disorder  Would benefit from starting patch. Smoking cessation may also help manage restless legs.     Atrophic Vaginitis  May benefit from formulary alternative    PLAN:                            Recommend starting nicotine patch  Could consider formulary alternative to premarin cream. Zari to check insurance formulary fo alternatives    Follow-up: 1 month via [employer]    SUBJECTIVE/OBJECTIVE:                          Zari Francisco is a 64 year old female seen for a follow up visit. She was referred to me from Susanna Deyr Follow up from 6/4/2024.  .  Reason for visit: RLS    Allergies/ADRs: Reviewed in chart  Past Medical History: Reviewed in chart  Tobacco: She reports that she has been smoking cigarettes. She has never used smokeless tobacco.  Alcohol: history of alcohol dependence; in recovery  Caffeine: 1/2 caff  only in the am    Medication Adherence/Access: Per patient, misses medication 0 times per week.   Medication barriers: none.   The patient fills medications at [place]: NO, fills medications at [facility].  Sets alarm 11am for adderall, 430 for ropinirole and 815 for evening meds    RLS:  gabapentin 300mg at 2pm, 500mg of gabapentin at 430pm, and 900mg at bedtime.  ropinirole 0.5mg at bedtime (1-2 hours before bedtime)-is working on tapering off very slowly. Soon she will be decreasing her dose to 0.25mg at bedtime.    Has increased her 430pm dose to 400mg and that was not helpful so increased dose to 500mg. 900mg at bedtime is working well and 500mg at 430pm is working well.  Denies side effects. Follows with sleep medicine.    Mental Health     Anxiety and ADHD  Escitalopram 20mg once daily  Adderall XR 15mg once daily  Patient reports no  current medication side effects.  Current symptoms include: Improved.  Had an episode of depression in January and never had this before and Dr. Valentine increased her dose of escitalopram. Son is going through a divorce, her  is trying to holding on to his business.  Doesn't take adderall everyday, usually won't take on the weekends.    Follows with Dr. Valentine     Supplements     Fish Oil 1200mg daily  No reported issues at this time.        Back Pain  Etodolac 500mg twice daily as needed  Methocarbamol 500mg three times daily as needed    Rarely takes etodolac but will take the muscle relaxer a couple of times a week when her back flares up and this is effective and denies side effects.     Tobacco Use Disorder  Nicotine Replacement: Patch 7mg and Lozenge 2mg  Smoking a pack a week or not even. 2 cigarettes a day. Not currently using the patches or lozenges. Started smoking in her 50's.  When she is on the patch she wasn't smoking.  Currently has not been smoking for the last couple of the days and doesn't have any cigarettes at home.    Atrophic Vaginitis  Premarin 0.625mg cream insert 0.5gm intravaginally twice a week  Had been on oral medications and didn't like taking the orals.    Has been using once a week and has been effective. Insurance doesn't cover this medication.     Today's Vitals: There were no vitals taken for this visit.  ----------------    I spent 16 minutes with this patient today. All changes were made via collaborative practice agreement with Susanna Dyer A copy of the visit note was provided to the patient's provider(s).    A summary of these recommendations was sent via [employer].    Nay Francois, Pharm.D, BCACP  Medication Therapy Management Pharmacist    Telemedicine Visit Details  The patient's medications can be safely assessed via a telemedicine encounter.  Type of service:  Video Conference via [employer]  Originating Location (pt. Location): Home    Distant Location  (provider location):  On-site  Start Time: 8:35AM  End Time: 8:59AM     Medication Therapy Recommendations  Atrophic vaginitis   1 Current Medication: conjugated estrogens (PREMARIN) 0.625 MG/GM vaginal cream   Current Medication Sig: INSERT 0.5 GRAM INTRAVAGINALLY 2 TIMES A WEEK AS DIRECTED   Rationale: Cannot afford medication product - Cost - Adherence   Recommendation: Change Medication   Status: Accepted per CPA   Identified Date: 3/19/2025 Completed Date: 3/19/2025         Tobacco use disorder   1 Current Medication: nicotine (NICODERM CQ) 7 MG/24HR 24 hr patch   Current Medication Sig: Place 1 patch over 24 hours onto the skin every 24 hours.   Rationale: Frequency inappropriate - Dosage too low - Effectiveness   Recommendation: Increase Frequency   Status: Patient Agreed - Adherence/Education   Identified Date: 3/19/2025 Completed Date: 3/19/2025

## 2025-03-19 ENCOUNTER — VIRTUAL VISIT (OUTPATIENT)
Dept: PHARMACY | Facility: CLINIC | Age: 65
End: 2025-03-19
Payer: COMMERCIAL

## 2025-03-19 DIAGNOSIS — G25.81 RESTLESS LEG SYNDROME: Primary | ICD-10-CM

## 2025-03-19 DIAGNOSIS — Z78.9 TAKES DIETARY SUPPLEMENTS: ICD-10-CM

## 2025-03-19 DIAGNOSIS — M54.9 BACK PAIN, UNSPECIFIED BACK LOCATION, UNSPECIFIED BACK PAIN LATERALITY, UNSPECIFIED CHRONICITY: ICD-10-CM

## 2025-03-19 DIAGNOSIS — F98.8 ATTENTION DEFICIT DISORDER (ADD) WITHOUT HYPERACTIVITY: ICD-10-CM

## 2025-03-19 DIAGNOSIS — F17.200 TOBACCO USE DISORDER: ICD-10-CM

## 2025-03-19 DIAGNOSIS — F32.A ANXIETY AND DEPRESSION: ICD-10-CM

## 2025-03-19 DIAGNOSIS — F41.9 ANXIETY AND DEPRESSION: ICD-10-CM

## 2025-03-19 DIAGNOSIS — N95.2 ATROPHIC VAGINITIS: ICD-10-CM

## 2025-03-19 PROCEDURE — 99207 PR NO CHARGE LOS: CPT | Mod: 95 | Performed by: PHARMACIST

## 2025-03-19 RX ORDER — DEXTROAMPHETAMINE SACCHARATE, AMPHETAMINE ASPARTATE MONOHYDRATE, DEXTROAMPHETAMINE SULFATE AND AMPHETAMINE SULFATE 3.75; 3.75; 3.75; 3.75 MG/1; MG/1; MG/1; MG/1
15 CAPSULE, EXTENDED RELEASE ORAL DAILY
COMMUNITY

## 2025-03-19 RX ORDER — DEXTROAMPHETAMINE SACCHARATE, AMPHETAMINE ASPARTATE, DEXTROAMPHETAMINE SULFATE AND AMPHETAMINE SULFATE 3.75; 3.75; 3.75; 3.75 MG/1; MG/1; MG/1; MG/1
15 TABLET ORAL DAILY
COMMUNITY
End: 2025-03-19

## 2025-03-19 NOTE — PATIENT INSTRUCTIONS
"Recommendations from today's MTM visit:                                                         Recommend starting nicotine patch  Could consider formulary alternative to premarin cream. Zari to check insurance formulary fo alternatives    Follow-up: 1 month via [employer]    It was great speaking with you today.  I value your experience and would be very thankful for your time in providing feedback in our clinic survey. In the next few days, you may receive an email or text message from [facility] [employer] with a link to a survey related to your  clinical pharmacist.\"     To schedule another MTM appointment, please call the clinic directly or you may call the MTM scheduling line at [phone] or toll-free at [phone].     My Clinical Pharmacist's contact information:                                                      Please feel free to contact me with any questions or concerns you have.      Nay Francois, Pharm.D, BCACP  Medication Therapy Management Pharmacist      "

## 2025-03-19 NOTE — LETTER
"Recommended To-Do List      Prepared on: Mar 19, 2025       You can get the best results from your medications by completing the items on this \"To-Do List.\"      Bring your To-Do List when you go to your doctor. And, share it with your family or caregivers.    My To-Do List:  What we talked about: What I should do:   The cost of your medication(s)    Change the medication you are taking from conjugated estrogens (Premarin) to formulary alternative          What we talked about: What I should do:   Your medication dosage being too low    Increase how often you take nicotine (NICODERM CQ)          What we talked about: What I should do:                     "

## 2025-03-19 NOTE — LETTER
_  Medication List        Prepared on: Mar 19, 2025     Bring your Medication List when you go to the doctor, hospital, or   emergency room. And, share it with your family or caregivers.     Note any changes to how you take your medications.  Cross out medications when you no longer use them.    Medication How I take it Why I use it Prescriber   amphetamine-dextroamphetamine (ADDERALL XR) 15 MG 24 hr capsule Take 15 mg by mouth daily.  ADHD Marie Valentine DO   conjugated estrogens (PREMARIN) 0.625 MG/GM vaginal cream INSERT 0.5 GRAM INTRAVAGINALLY 2 TIMES A WEEK AS DIRECTED Atrophic Vaginitis Susanna Dyer MD   escitalopram (LEXAPRO) 20 MG tablet Take 1 tablet (20 mg) by mouth daily. Moderate episode of recurrent major depressive disorder (H) CARLOS Danielson CNP   etodolac (LODINE CR) 500 MG 24 hr tablet Take 1 tablet (500 mg) by mouth daily. Degeneration of intervertebral disc of lumbar region with discogenic back pain and lower extremity pain; Lumbar Disc Herniation with Radiculopathy Jacky Sweeney MD   gabapentin (NEURONTIN) 100 MG capsule Take 1 capsule (100 mg) by mouth 2 times daily. Restless Leg Syndrome IVANIA Marmolejo   gabapentin (NEURONTIN) 300 MG capsule [START ON 3/20/2025] Take 300mg at 1:30pm and 300mg at 4pm and 900mg at bedtime Restless Leg Syndrome IVANIA Marmolejo   methocarbamol (ROBAXIN) 500 MG tablet Take 1 tablet (500 mg) by mouth 3 times daily as needed for muscle spasms. Degeneration of intervertebral disc of lumbar region with discogenic back pain and lower extremity pain; Lumbar Disc Herniation with Radiculopathy Jacky Sweeney MD   nicotine (COMMIT) 2 MG lozenge Place 1 lozenge (2 mg) inside cheek every hour as needed for nicotine withdrawal symptoms. Tobacco Dependence Marie Valentine DO   nicotine (NICODERM CQ) 7 MG/24HR 24 hr patch Place 1 patch over 24 hours onto the skin every 24 hours. Tobacco Dependence Marie Valentine DO   Omega-3 Fatty Acids (FISH OIL) 1200  MG capsule Take 2,400 mg by mouth daily  General Health  Patient Reported   rOPINIRole (REQUIP) 0.5 MG tablet Take 1 tablet (0.5 mg) by mouth 2 times daily.  Restless Legs Syndrome IVANIA Marmolejo         Add new medications, over-the-counter drugs, herbals, vitamins, or  minerals in the blank rows below.    Medication How I take it Why I use it Prescriber                                      Allergies:      - Metal [staples]  - Sudafed [pseudoephedrine Hcl]  - Sympathomimetics  - Contrast Dye - Itching        Side effects I have had:      Not on File        Other Information:              My notes and questions:

## 2025-03-19 NOTE — LETTER
March 19, 2025  Zari Francisco  [address] [place] [address]    Dear Ms. Francisco, [facility] [place]     Thank you for talking with me on Mar 19, 2025 about your health and medications. As a follow-up to our conversation, I have included two documents:      Your Recommended To-Do List has steps you should take to get the best results from your medications.  Your Medication List will help you keep track of your medications and how to take them.    If you want to talk about these documents, please call Nay Francois RPH at phone: [phone], Monday-Friday 8-4:30pm.    I look forward to working with you and your doctors to make sure your medications work well for you.    Sincerely,  Nay Francois RPH  [facility] Pharmacist, [facility]

## 2025-03-25 ENCOUNTER — VIRTUAL VISIT (OUTPATIENT)
Dept: PSYCHIATRY | Facility: CLINIC | Age: 65
End: 2025-03-25
Payer: COMMERCIAL

## 2025-03-25 VITALS — BODY MASS INDEX: 22.5 KG/M2 | WEIGHT: 140 LBS | HEIGHT: 66 IN

## 2025-03-25 DIAGNOSIS — F98.8 ATTENTION DEFICIT DISORDER (ADD) WITHOUT HYPERACTIVITY: Primary | ICD-10-CM

## 2025-03-25 DIAGNOSIS — F10.11 ALCOHOL ABUSE, IN REMISSION: ICD-10-CM

## 2025-03-25 DIAGNOSIS — F33.41 RECURRENT MAJOR DEPRESSIVE DISORDER, IN PARTIAL REMISSION: ICD-10-CM

## 2025-03-25 DIAGNOSIS — F41.1 GAD (GENERALIZED ANXIETY DISORDER): ICD-10-CM

## 2025-03-25 PROCEDURE — 98006 SYNCH AUDIO-VIDEO EST MOD 30: CPT | Performed by: PSYCHIATRY & NEUROLOGY

## 2025-03-25 PROCEDURE — G2211 COMPLEX E/M VISIT ADD ON: HCPCS | Performed by: PSYCHIATRY & NEUROLOGY

## 2025-03-25 RX ORDER — ESCITALOPRAM OXALATE 20 MG/1
20 TABLET ORAL DAILY
Qty: 90 TABLET | Refills: 1 | Status: SHIPPED | OUTPATIENT
Start: 2025-03-25

## 2025-03-25 RX ORDER — DEXTROAMPHETAMINE SACCHARATE, AMPHETAMINE ASPARTATE MONOHYDRATE, DEXTROAMPHETAMINE SULFATE AND AMPHETAMINE SULFATE 3.75; 3.75; 3.75; 3.75 MG/1; MG/1; MG/1; MG/1
15 CAPSULE, EXTENDED RELEASE ORAL DAILY
Qty: 30 CAPSULE | Refills: 0 | Status: SHIPPED | OUTPATIENT
Start: 2025-05-24 | End: 2025-06-23

## 2025-03-25 RX ORDER — DEXTROAMPHETAMINE SACCHARATE, AMPHETAMINE ASPARTATE MONOHYDRATE, DEXTROAMPHETAMINE SULFATE AND AMPHETAMINE SULFATE 3.75; 3.75; 3.75; 3.75 MG/1; MG/1; MG/1; MG/1
15 CAPSULE, EXTENDED RELEASE ORAL DAILY
Qty: 30 CAPSULE | Refills: 0 | Status: SHIPPED | OUTPATIENT
Start: 2025-03-25 | End: 2025-04-24

## 2025-03-25 RX ORDER — DEXTROAMPHETAMINE SACCHARATE, AMPHETAMINE ASPARTATE MONOHYDRATE, DEXTROAMPHETAMINE SULFATE AND AMPHETAMINE SULFATE 3.75; 3.75; 3.75; 3.75 MG/1; MG/1; MG/1; MG/1
15 CAPSULE, EXTENDED RELEASE ORAL DAILY
Qty: 30 CAPSULE | Refills: 0 | Status: SHIPPED | OUTPATIENT
Start: 2025-04-24 | End: 2025-05-24

## 2025-03-25 RX ORDER — DEXTROAMPHETAMINE SACCHARATE, AMPHETAMINE ASPARTATE, DEXTROAMPHETAMINE SULFATE AND AMPHETAMINE SULFATE 2.5; 2.5; 2.5; 2.5 MG/1; MG/1; MG/1; MG/1
10 TABLET ORAL DAILY PRN
Qty: 30 TABLET | Refills: 0 | Status: SHIPPED | OUTPATIENT
Start: 2025-05-24 | End: 2025-06-23

## 2025-03-25 RX ORDER — DEXTROAMPHETAMINE SACCHARATE, AMPHETAMINE ASPARTATE, DEXTROAMPHETAMINE SULFATE AND AMPHETAMINE SULFATE 2.5; 2.5; 2.5; 2.5 MG/1; MG/1; MG/1; MG/1
10 TABLET ORAL DAILY PRN
Qty: 30 TABLET | Refills: 0 | Status: SHIPPED | OUTPATIENT
Start: 2025-03-25 | End: 2025-04-24

## 2025-03-25 RX ORDER — DEXTROAMPHETAMINE SACCHARATE, AMPHETAMINE ASPARTATE, DEXTROAMPHETAMINE SULFATE AND AMPHETAMINE SULFATE 2.5; 2.5; 2.5; 2.5 MG/1; MG/1; MG/1; MG/1
10 TABLET ORAL DAILY PRN
Qty: 30 TABLET | Refills: 0 | Status: SHIPPED | OUTPATIENT
Start: 2025-04-24 | End: 2025-05-24

## 2025-03-25 ASSESSMENT — PAIN SCALES - GENERAL: PAINLEVEL_OUTOF10: MODERATE PAIN (5)

## 2025-03-25 NOTE — PATIENT INSTRUCTIONS
Treatment Plan:  Continue Adderall XR 15 mg daily as needed for ADHD.   Start Adderall immediate release 10 mg daily as needed for ADHD.  Continue Lexapro/escitalopram 20 mg daily  for mood and anxiety.   Continue all other cares per primary care provider.   Continue all other medications as reviewed per electronic medical record today.   Safety plan reviewed. To the Emergency Department as needed or call after hours crisis line at [phone] or [phone]. [place] Crisis Text Line. Text [place] to 922596 or Suicide LifeLine Chat: suicidepreventionlifeline.org/chat  Continue psychotherapy as needed for additional support and ongoing development of nonpharmacologic coping skills and strategies.  Schedule an appointment with me in about 3 months or sooner as needed. Call [facility] at [phone] to schedule.  Follow up with primary care provider as planned or for acute medical concerns.  Call the psychiatric nurse line with medication questions or concerns at [phone].  [id number] may be used to communicate with your provider, but this is not intended to be used for emergencies.    Have previously discussed risks of stimulant medication including, but not limited to, decreased appetite, risk of tics (and that they may be lasting), trouble sleeping, cardiac risks such as increased heart rate and blood pressure, and rare risk of sudden cardiac death.  Also risk of addiction/tolerance/dependence.    Patient Education   Collaborative Care Psychiatry Service  What to Expect  Here's what to expect from your Collaborative Care Psychiatry Service (CCPS).   About CCPS  CCPS means 2 people work together to help you get better. You'll meet with a behavioral health clinician and a psychiatric doctor. A behavioral health clinician helps people with mental health problems by talking with them. A psychiatric doctor helps people by giving them medicine.  How it works  At every visit, you'll see the behavioral  "health clinician (BHC) first. They'll talk with you about how you're doing and teach you how to feel better.   Then you'll see the psychiatric doctor. This doctor can help you deal with troubling thoughts and feelings by giving you medicine. They'll make sure you know the plan for your care.   CCPS usually takes 3 to 6 visits. If you need more visits, we may have you start seeing a different psychiatric doctor for ongoing care.  If you have any questions or concerns, we'll be glad to talk with you.  About visits  Be open  At your visits, please talk openly about your problems. It may feel hard, but it's the best way for us to help you.  Cancelling visits  If you can't come to your visit, please call us right away at [phone]. If you don't cancel at least 24 hours (1 full day) before your visit, that's \"late cancellation.\"  Being late to visits  Being very late is the same as not showing up. You will be a \"no show\" if:  Your appointment starts with a BHC, and you're more than 15 minutes late for a 30-minute (half hour) visit. This will also cancel your appointment with the psychiatric doctor.  Your appointment is with a psychiatric doctor only, and you're more than 15 minutes late for a 30-minute (half hour) visit.  Your appointment is with a psychiatric doctor only, and you're more than 30 minutes late for a 60-minute (full hour) visit.  If you cancel late or don't show up 2 times within 6 months, we may end your care.   Getting help between visits  If you need help between visits, you can call us Monday to Friday from 8 a.m. to 4:30 p.m. at [phone].  Emergency care  Call 911 or go to the nearest emergency department if your life or someone else's life is in danger.  Call 598 anytime to reach the national Suicide and Crisis hotline.  Medicine refills  To refill your medicine, call your pharmacy. You can also call [facility]'s Behavioral Access at [phone], Monday to Friday, 8 a.m. to 4:30 " p.m. It can take 1 to 3 business days to get a refill.   Forms, letters, and tests  You may have papers to fill out, like FMLA, short-term disability, and workability. We can help you with these forms at your visits, but you must have an appointment. You may need more than 1 visit for this, to be in an intensive therapy program, or both.  Before we can give you medicine for ADHD, we may refer you to get tested for it or confirm it another way.  We may not be able to give you an emotional support animal letter.  We don't do mental health checks ordered by the court.   We don't do mental health testing, but we can refer you to get tested.   Thank you for choosing us for your care.  For informational purposes only. Not to replace the advice of your health care provider. Copyright   2022 [facility]. All rights reserved. [employer] 489920 - Rev 11/24.

## 2025-03-25 NOTE — PROGRESS NOTES
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 LifeLine Chat: [email or web address]/chat  Continue psychotherapy as needed for additional support and ongoing development of nonpharmacologic coping skills and strategies.  Schedule an appointment with me in about 3 months or sooner as needed. Call [facility] at [phone] to schedule.  Follow up with primary care provider as planned or for acute medical concerns.  Call the psychiatric nurse line with medication questions or concerns at [phone].  [employer] may be used to communicate with your provider, but this is not intended to be used for emergencies.    Have previously discussed risks of stimulant medication including, but not limited to, decreased appetite, risk of tics (and that they may be lasting), trouble sleeping, cardiac risks such as increased heart rate and blood pressure, and rare risk of sudden cardiac death.  Also risk of addiction/tolerance/dependence.    Administrative Billing:   Phone Call/Video Duration: 29 Minutes  Start: 9:00a  Stop: 9:29a      Patient Status:  Patient is a long-term/continuous care patient.     Signed:   Marie Valentine DO  [facility]    Disclaimer: This note consists of symbols derived from keyboarding, dictation and/or voice recognition software. As a result, there may be errors in the script that have gone undetected. Please  consider this when interpreting information found in this chart.

## 2025-03-25 NOTE — NURSING NOTE
Current patient location: [facility] [place] [address]    Is the patient currently in the state of [place]? YES    Visit mode: VIDEO    If the visit is dropped, the patient can be reconnected by:VIDEO VISIT: Send to e-mail at: [email or web address]    Will anyone else be joining the visit? NO  (If patient encounters technical issues they should call [phone])    Are changes needed to the allergy or medication list? No    Are refills needed on medications prescribed by this physician? Discuss with provider   Citalopram, adderall  Rooming Documentation:  Questionnaire(s) completed    Reason for visit: RECHFRANSISCO GOMEZF      
No

## 2025-03-25 NOTE — PROGRESS NOTES
"Virtual Visit Details    Type of service:  Video Visit   Video Start Time: {video visit start/end time for provider to select:562643}  Video End Time:{video visit start/end time for provider to select:185073}    Originating Location (pt. Location): {video visit patient location:318010::\"Home\"}  {PROVIDER LOCATION On-site should be selected for visits conducted from your clinic location or adjoining [facility] hospital, academic office, or other nearby [facility] building. Off-site should be selected for all other provider locations, including home:[address]}  Distant Location (provider location):  {virtual location provider:194784}  Platform used for Video Visit: {Virtual Visit Platforms:126130::\"Spectra [employer]\"}  "

## 2025-04-23 ENCOUNTER — OFFICE VISIT (OUTPATIENT)
Dept: ORTHOPEDICS | Facility: CLINIC | Age: 65
End: 2025-04-23
Payer: COMMERCIAL

## 2025-04-23 DIAGNOSIS — M51.16 LUMBAR DISC HERNIATION WITH RADICULOPATHY: ICD-10-CM

## 2025-04-23 DIAGNOSIS — M51.362 DEGENERATION OF INTERVERTEBRAL DISC OF LUMBAR REGION WITH DISCOGENIC BACK PAIN AND LOWER EXTREMITY PAIN: Primary | ICD-10-CM

## 2025-04-23 DIAGNOSIS — M54.16 LUMBAR RADICULAR PAIN: ICD-10-CM

## 2025-04-23 DIAGNOSIS — M48.061 LUMBAR FORAMINAL STENOSIS: ICD-10-CM
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
## 2025-04-30 DIAGNOSIS — G25.81 RESTLESS LEG SYNDROME: ICD-10-CM

## 2025-04-30 NOTE — TELEPHONE ENCOUNTER
Gabapentin 100 MG Capsules      Last Written Prescription Date:  12/20/2024  Last Fill Quantity: 60,   # refills: 3  Last Office Visit: 3/12/2025  Future Office visit:  Nothing scheduled with sleep.  Edie Vogel, CMA

## 2025-05-01 RX ORDER — GABAPENTIN 100 MG/1
100 CAPSULE ORAL 2 TIMES DAILY
Qty: 60 CAPSULE | Refills: 3 | Status: SHIPPED | OUTPATIENT
Start: 2025-05-01

## 2025-05-02 ENCOUNTER — ANCILLARY PROCEDURE (OUTPATIENT)
Dept: GENERAL RADIOLOGY | Facility: CLINIC | Age: 65
End: 2025-05-02
Attending: PREVENTIVE MEDICINE
Payer: COMMERCIAL

## 2025-05-02 DIAGNOSIS — M54.16 LUMBAR RADICULAR PAIN: ICD-10-CM

## 2025-05-02 DIAGNOSIS — M51.16 LUMBAR DISC HERNIATION WITH RADICULOPATHY: ICD-10-CM

## 2025-05-02 DIAGNOSIS — M51.362 DEGENERATION OF INTERVERTEBRAL DISC OF LUMBAR REGION WITH DISCOGENIC BACK PAIN AND LOWER EXTREMITY PAIN: ICD-10-CM

## 2025-05-02 PROCEDURE — 64483 NJX AA&/STRD TFRM EPI L/S 1: CPT | Mod: LT | Performed by: RADIOLOGY

## 2025-05-02 RX ORDER — METHYLPREDNISOLONE ACETATE 80 MG/ML
80 INJECTION, SUSPENSION INTRA-ARTICULAR; INTRALESIONAL; INTRAMUSCULAR; SOFT TISSUE ONCE
Status: COMPLETED | OUTPATIENT
Start: 2025-05-02 | End: 2025-05-02

## 2025-05-02 RX ORDER — BUPIVACAINE HYDROCHLORIDE 5 MG/ML
2 INJECTION, SOLUTION PERINEURAL ONCE
Status: COMPLETED | OUTPATIENT
Start: 2025-05-02 | End: 2025-05-02

## 2025-05-02 RX ORDER — IOPAMIDOL 408 MG/ML
10 INJECTION, SOLUTION INTRATHECAL ONCE
Status: COMPLETED | OUTPATIENT
Start: 2025-05-02 | End: 2025-05-02

## 2025-05-02 RX ADMIN — METHYLPREDNISOLONE ACETATE 80 MG: 80 INJECTION, SUSPENSION INTRA-ARTICULAR; INTRALESIONAL; INTRAMUSCULAR; SOFT TISSUE at 08:16

## 2025-05-02 RX ADMIN — BUPIVACAINE HYDROCHLORIDE 10 MG: 5 INJECTION, SOLUTION PERINEURAL at 08:16

## 2025-05-02 RX ADMIN — IOPAMIDOL 10 ML: 408 INJECTION, SOLUTION INTRATHECAL at 08:15

## 2025-05-02 NOTE — PROGRESS NOTES
Imaging Discharge Instructions - Post Injection       After you go home:  Be cautious when walking. Numbness or weakness in the lower extremities may occur for up to 6-8 hours after the procedure due to the effects of the local anesthetic   Minimize your activity for 24 hours  You may resume normal activity the following day   You may remove the bandage in the evening or the next morning   You may resume bathing the following day   Drink 2 extra glasses of fluid today (unless on fluid restrictions)   DO NOT drive or operate machinery at home or work for 24 hours after injections The use of local anesthetic can slow your reflexes  You may experience mild to moderate increase in pain for several days following your injection. You may use ice packs and/or over the counter pain relievers if needed   Visit an Urgent Care or Emergency Room if you experience the following:  Worsening redness or swelling at the injections site   Discharge from the injection site   You develop a temperature of 101  F or higher   Additional Instructions:  You may resume blood thinners at your regular dose the day following your procedure. Follow up with your physician to have your INR checked if indicated   Follow up with your ordering provider 2 weeks after injection regarding pain relief from this procedure   Contact Information:   To reach a nurse advisor at the [facility] during regular business hours please call [phone].  After hours, call [facility] at [phone]/Toll Free [phone] and ask for the Radiologist on call.

## 2025-05-26 SDOH — HEALTH STABILITY: PHYSICAL HEALTH: ON AVERAGE, HOW MANY DAYS PER WEEK DO YOU ENGAGE IN MODERATE TO STRENUOUS EXERCISE (LIKE A BRISK WALK)?: 4 DAYS

## 2025-05-26 SDOH — HEALTH STABILITY: PHYSICAL HEALTH: ON AVERAGE, HOW MANY MINUTES DO YOU ENGAGE IN EXERCISE AT THIS LEVEL?: 60 MIN

## 2025-05-27 ENCOUNTER — VIRTUAL VISIT (OUTPATIENT)
Dept: ORTHOPEDICS | Facility: CLINIC | Age: 65
End: 2025-05-27
Attending: PREVENTIVE MEDICINE
Payer: COMMERCIAL

## 2025-05-27 DIAGNOSIS — M51.16 LUMBAR DISC HERNIATION WITH RADICULOPATHY: ICD-10-CM

## 2025-05-27 DIAGNOSIS — M51.362 DEGENERATION OF INTERVERTEBRAL DISC OF LUMBAR REGION WITH DISCOGENIC BACK PAIN AND LOWER EXTREMITY PAIN: ICD-10-CM

## 2025-05-27 DIAGNOSIS — M51.369 DDD (DEGENERATIVE DISC DISEASE), LUMBAR: Primary | ICD-10-CM

## 2025-05-27 DIAGNOSIS — M54.16 LUMBAR RADICULAR PAIN: ICD-10-CM

## 2025-05-27 DIAGNOSIS — M48.061 LUMBAR FORAMINAL STENOSIS: ICD-10-CM

## 2025-05-27 PROCEDURE — 98005 SYNCH AUDIO-VIDEO EST LOW 20: CPT | Performed by: PREVENTIVE MEDICINE

## 2025-05-27 RX ORDER — ETODOLAC 500 MG/1
500 TABLET, EXTENDED RELEASE ORAL DAILY
Qty: 60 TABLET | Refills: 1 | Status: SHIPPED | OUTPATIENT
Start: 2025-05-27

## 2025-05-27 RX ORDER — METHOCARBAMOL 500 MG/1
500 TABLET, FILM COATED ORAL 3 TIMES DAILY PRN
Qty: 60 TABLET | Refills: 1 | Status: SHIPPED | OUTPATIENT
Start: 2025-05-27

## 2025-05-27 NOTE — LETTER
5/27/2025      Zari Francisco  [address]  [place] [address]      Dear Colleague,    Thank you for referring your patient, Zari Francisco, to the [facility] [place]. Please see a copy of my visit note below.    Zari is a 65 year old who is being evaluated via a billable video visit.    How would you like to obtain your AVS? MyChart  If the video visit is dropped, the invitation should be resent by: Text to cell phone: [phone]  Will anyone else be joining your video visit? No        Subjective  Zari is a 65 year old, presenting for the following health issues:  RECHECK (3 week follow up after lumbar YENNIFER)    Video Start Time: 10:36 AM    HPI      Followup for lumbar YENNIFER  Pain has improved in low back and neither leg has bothered her with sciatica  Overall feeling significant improvement - over 75% improvement  Still has some symptoms with increased overuse activities like gardening, but easier to manage symptoms with rest, hot pack and medications as prescribed        Review of Systems  Constitutional, HEENT, cardiovascular, pulmonary, gi and gu systems are negative, except as otherwise noted.      Objective          Vitals:  No vitals were obtained today due to virtual visit.    Physical Exam   GENERAL: alert and no distress  EYES: Eyes grossly normal to inspection.  No discharge or erythema, or obvious scleral/conjunctival abnormalities.  RESP: No audible wheeze, cough, or visible cyanosis.    SKIN: Visible skin clear. No significant rash, abnormal pigmentation or lesions.  NEURO: Cranial nerves grossly intact.  Mentation and speech appropriate for age.  PSYCH: Appropriate affect, tone, and pace of words    Assessment/Plan  64 yo female with lumbar ddd, disc herniations, radiculopathy, improved  \  Reviewed lumbar MRI shows ddd, disc herniations  Discussed can consider repeat YENNIFER by August IF necessary  Will followup in July for discussion of symptoms  Cont. Medications as  written  RX given for lodine and robaxin  Given referral to discuss spine surgery options as well          Video-Visit Details    Type of service:  Video Visit   Video End Time:10:50 AM  Originating Location (pt. Location): Home    Distant Location (provider location):  On-site  Platform used for Video Visit: Kasey  Signed Electronically by: Jacky Sweeney MD      Again, thank you for allowing me to participate in the care of your patient.        Sincerely,        Jacky Sweeney MD    Electronically signed

## 2025-05-27 NOTE — PROGRESS NOTES
Zari is a 65 year old who is being evaluated via a billable video visit.    How would you like to obtain your AVS? MyChart  If the video visit is dropped, the invitation should be resent by: Text to cell phone: [phone]  Will anyone else be joining your video visit? No        Subjective   Zari is a 65 year old, presenting for the following health issues:  RECHECK (3 week follow up after lumbar YENNIFER)    Video Start Time: 10:36 AM    HPI      Followup for lumbar YENNIFER  Pain has improved in low back and neither leg has bothered her with sciatica  Overall feeling significant improvement - over 75% improvement  Still has some symptoms with increased overuse activities like gardening, but easier to manage symptoms with rest, hot pack and medications as prescribed        Review of Systems  Constitutional, HEENT, cardiovascular, pulmonary, gi and gu systems are negative, except as otherwise noted.      Objective           Vitals:  No vitals were obtained today due to virtual visit.    Physical Exam   GENERAL: alert and no distress  EYES: Eyes grossly normal to inspection.  No discharge or erythema, or obvious scleral/conjunctival abnormalities.  RESP: No audible wheeze, cough, or visible cyanosis.    SKIN: Visible skin clear. No significant rash, abnormal pigmentation or lesions.  NEURO: Cranial nerves grossly intact.  Mentation and speech appropriate for age.  PSYCH: Appropriate affect, tone, and pace of words    Assessment/Plan  66 yo female with lumbar ddd, disc herniations, radiculopathy, improved  \  Reviewed lumbar MRI shows ddd, disc herniations  Discussed can consider repeat YENNIFER by August IF necessary  Will followup in July for discussion of symptoms  Cont. Medications as written  RX given for lodine and robaxin  Given referral to discuss spine surgery options as well          Video-Visit Details    Type of service:  Video Visit   Video End Time:10:50 AM  Originating Location (pt. Location): Home    Distant  Location (provider location):  On-site  Platform used for Video Visit: Kasey  Signed Electronically by: Jacky Sweeney MD

## 2025-05-27 NOTE — LETTER
5/27/2025      Zari Francisco  [address]  [place] [address]      Dear Colleague,    Thank you for referring your patient, Zari Francisco, to the [facility] [place]. Please see a copy of my visit note below.    Zari is a 65 year old who is being evaluated via a billable video visit.    How would you like to obtain your AVS? MyChart  If the video visit is dropped, the invitation should be resent by: Text to cell phone: [phone]  Will anyone else be joining your video visit? No        Subjective  Zari is a 65 year old, presenting for the following health issues:  RECHECK (3 week follow up after lumbar YENNIFER)    Video Start Time: 10:36 AM    HPI      Followup for lumbar YENNIFER  Pain has improved in low back and neither leg has bothered her with sciatica  Overall feeling significant improvement - over 75% improvement  Still has some symptoms with increased overuse activities like gardening, but easier to manage symptoms with rest, hot pack and medications as prescribed        Review of Systems  Constitutional, HEENT, cardiovascular, pulmonary, gi and gu systems are negative, except as otherwise noted.      Objective          Vitals:  No vitals were obtained today due to virtual visit.    Physical Exam   GENERAL: alert and no distress  EYES: Eyes grossly normal to inspection.  No discharge or erythema, or obvious scleral/conjunctival abnormalities.  RESP: No audible wheeze, cough, or visible cyanosis.    SKIN: Visible skin clear. No significant rash, abnormal pigmentation or lesions.  NEURO: Cranial nerves grossly intact.  Mentation and speech appropriate for age.  PSYCH: Appropriate affect, tone, and pace of words    Assessment/Plan  64 yo female with lumbar ddd, disc herniations, radiculopathy, improved  \  Reviewed lumbar MRI shows ddd, disc herniations  Discussed can consider repeat YENNIFER by August IF necessary  Will followup in July for discussion of symptoms  Cont. Medications as  written  RX given for lodine and robaxin  Given referral to discuss spine surgery options as well          Video-Visit Details    Type of service:  Video Visit   Video End Time:10:50 AM  Originating Location (pt. Location): Home    Distant Location (provider location):  On-site  Platform used for Video Visit: Kasey  Signed Electronically by: Jacky Sweeney MD      Again, thank you for allowing me to participate in the care of your patient.        Sincerely,        Jacky Sweeney MD    Electronically signed

## 2025-05-28 ENCOUNTER — PATIENT OUTREACH (OUTPATIENT)
Dept: CARE COORDINATION | Facility: CLINIC | Age: 65
End: 2025-05-28
Payer: COMMERCIAL

## 2025-06-16 ENCOUNTER — TELEPHONE (OUTPATIENT)
Dept: FAMILY MEDICINE | Facility: CLINIC | Age: 65
End: 2025-06-16
Payer: COMMERCIAL

## 2025-06-16 DIAGNOSIS — Z13.220 SCREENING FOR HYPERLIPIDEMIA: ICD-10-CM

## 2025-06-16 DIAGNOSIS — Z13.1 SCREENING FOR DIABETES MELLITUS (DM): ICD-10-CM

## 2025-06-16 DIAGNOSIS — G25.81 RESTLESS LEG SYNDROME: ICD-10-CM

## 2025-06-16 DIAGNOSIS — Z13.0 SCREENING FOR DEFICIENCY ANEMIA: Primary | ICD-10-CM

## 2025-06-16 DIAGNOSIS — R73.01 ELEVATED FASTING GLUCOSE: ICD-10-CM

## 2025-06-16 RX ORDER — GABAPENTIN 300 MG/1
CAPSULE ORAL
Qty: 150 CAPSULE | Refills: 2 | Status: SHIPPED | OUTPATIENT
Start: 2025-06-22

## 2025-06-16 NOTE — TELEPHONE ENCOUNTER
Gabapentin 300 MG Capsules   Last Written Prescription Date:  3/20/2025  Last Fill Quantity: 150,   # refills: 2  Last Office Visit: 3/12/2025, RTC 1 year.  Future Office visit: Nothing scheduled currently.    Edie Vogel, CMA

## 2025-06-16 NOTE — TELEPHONE ENCOUNTER
Patient has a scheduling conflict and needs to reschedule her appointment on Friday 6/20/25. Is there any time that would work before your leave?

## 2025-06-16 NOTE — TELEPHONE ENCOUNTER
General Call    Contacts       Contact Date/Time Type Contact Phone/Fax    06/16/2025 08:27 AM CDT Phone (Incoming) Zari Francisco DONAVAN (Self) [phone] (M)          Reason for Call:  calling back regarding appt change request    What are your questions or concerns:  pt called back. Changed mind, now states they will keep the 06/20/25 appt as it stands.     Date of last appointment with provider:     Could we send this information to you in [place] or would you prefer to receive a phone call?:   Patient would prefer a phone call   Okay to leave a detailed message?: Yes at Cell number on file:    Telephone Information:   Mobile [phone]

## 2025-06-16 NOTE — TELEPHONE ENCOUNTER
Unclear what PCP needs to do with this encounter if patient is already keeping the appointment as it is  Please clarify

## 2025-06-20 PROBLEM — R91.1 NODULE OF LEFT LUNG: Status: ACTIVE | Noted: 2018-02-12

## 2025-06-20 PROBLEM — Z80.3 FAMILY HISTORY OF BREAST CANCER: Status: ACTIVE | Noted: 2025-06-20

## 2025-06-23 ENCOUNTER — MYC MEDICAL ADVICE (OUTPATIENT)
Dept: PSYCHIATRY | Facility: CLINIC | Age: 65
End: 2025-06-23
Payer: COMMERCIAL

## 2025-06-24 ENCOUNTER — RESULTS FOLLOW-UP (OUTPATIENT)
Dept: FAMILY MEDICINE | Facility: CLINIC | Age: 65
End: 2025-06-24

## 2025-06-24 ENCOUNTER — LAB (OUTPATIENT)
Dept: LAB | Facility: CLINIC | Age: 65
End: 2025-06-24
Payer: COMMERCIAL

## 2025-06-24 ENCOUNTER — ANCILLARY PROCEDURE (OUTPATIENT)
Dept: ULTRASOUND IMAGING | Facility: CLINIC | Age: 65
End: 2025-06-24
Attending: FAMILY MEDICINE
Payer: COMMERCIAL

## 2025-06-24 DIAGNOSIS — R73.01 ELEVATED FASTING GLUCOSE: Primary | ICD-10-CM

## 2025-06-24 DIAGNOSIS — Z13.220 SCREENING FOR HYPERLIPIDEMIA: ICD-10-CM

## 2025-06-24 DIAGNOSIS — R93.7 ABNORMAL MRI, LUMBAR SPINE: ICD-10-CM

## 2025-06-24 DIAGNOSIS — Z13.0 SCREENING FOR DEFICIENCY ANEMIA: ICD-10-CM

## 2025-06-24 DIAGNOSIS — Z13.1 SCREENING FOR DIABETES MELLITUS (DM): ICD-10-CM

## 2025-06-24 DIAGNOSIS — K76.9 LESION OF LEFT LOBE OF LIVER: ICD-10-CM

## 2025-06-24 LAB
ALBUMIN SERPL BCG-MCNC: 4.2 G/DL (ref 3.5–5.2)
ALP SERPL-CCNC: 99 U/L (ref 40–150)
ALT SERPL W P-5'-P-CCNC: 17 U/L (ref 0–50)
ANION GAP SERPL CALCULATED.3IONS-SCNC: 11 MMOL/L (ref 7–15)
AST SERPL W P-5'-P-CCNC: 21 U/L (ref 0–45)
BILIRUB SERPL-MCNC: 0.4 MG/DL
BUN SERPL-MCNC: 22.1 MG/DL (ref 8–23)
CALCIUM SERPL-MCNC: 9.5 MG/DL (ref 8.8–10.4)
CHLORIDE SERPL-SCNC: 106 MMOL/L (ref 98–107)
CHOLEST SERPL-MCNC: 211 MG/DL
CREAT SERPL-MCNC: 0.73 MG/DL (ref 0.51–0.95)
EGFRCR SERPLBLD CKD-EPI 2021: 90 ML/MIN/1.73M2
ERYTHROCYTE [DISTWIDTH] IN BLOOD BY AUTOMATED COUNT: 12.2 % (ref 10–15)
EST. AVERAGE GLUCOSE BLD GHB EST-MCNC: 123 MG/DL
FASTING STATUS PATIENT QL REPORTED: YES
FASTING STATUS PATIENT QL REPORTED: YES
GLUCOSE SERPL-MCNC: 115 MG/DL (ref 70–99)
HBA1C MFR BLD: 5.9 % (ref 0–5.6)
HCO3 SERPL-SCNC: 24 MMOL/L (ref 22–29)
HCT VFR BLD AUTO: 39.8 % (ref 35–47)
HDLC SERPL-MCNC: 73 MG/DL
HGB BLD-MCNC: 13.5 G/DL (ref 11.7–15.7)
LDLC SERPL CALC-MCNC: 123 MG/DL
MCH RBC QN AUTO: 30.1 PG (ref 26.5–33)
MCHC RBC AUTO-ENTMCNC: 33.9 G/DL (ref 31.5–36.5)
MCV RBC AUTO: 89 FL (ref 78–100)
NONHDLC SERPL-MCNC: 138 MG/DL
PLATELET # BLD AUTO: 250 10E3/UL (ref 150–450)
POTASSIUM SERPL-SCNC: 4.6 MMOL/L (ref 3.4–5.3)
PROT SERPL-MCNC: 7 G/DL (ref 6.4–8.3)
RBC # BLD AUTO: 4.49 10E6/UL (ref 3.8–5.2)
SODIUM SERPL-SCNC: 141 MMOL/L (ref 135–145)
TRIGL SERPL-MCNC: 76 MG/DL
WBC # BLD AUTO: 6.6 10E3/UL (ref 4–11)

## 2025-06-24 PROCEDURE — 83036 HEMOGLOBIN GLYCOSYLATED A1C: CPT

## 2025-06-24 PROCEDURE — 80061 LIPID PANEL: CPT | Mod: GZ

## 2025-06-24 PROCEDURE — 36415 COLL VENOUS BLD VENIPUNCTURE: CPT

## 2025-06-24 PROCEDURE — 85027 COMPLETE CBC AUTOMATED: CPT

## 2025-06-24 PROCEDURE — 76700 US EXAM ABDOM COMPLETE: CPT | Performed by: RADIOLOGY

## 2025-06-24 PROCEDURE — 80053 COMPREHEN METABOLIC PANEL: CPT

## 2025-06-25 ENCOUNTER — VIRTUAL VISIT (OUTPATIENT)
Dept: PSYCHIATRY | Facility: CLINIC | Age: 65
End: 2025-06-25
Payer: COMMERCIAL

## 2025-06-25 DIAGNOSIS — F41.1 GAD (GENERALIZED ANXIETY DISORDER): ICD-10-CM

## 2025-06-25 DIAGNOSIS — F33.41 RECURRENT MAJOR DEPRESSIVE DISORDER, IN PARTIAL REMISSION: ICD-10-CM

## 2025-06-25 DIAGNOSIS — F98.8 ATTENTION DEFICIT DISORDER (ADD) WITHOUT HYPERACTIVITY: Primary | ICD-10-CM

## 2025-06-25 DIAGNOSIS — F10.11 ALCOHOL ABUSE, IN REMISSION: ICD-10-CM

## 2025-06-25 PROCEDURE — 98006 SYNCH AUDIO-VIDEO EST MOD 30: CPT | Performed by: PSYCHIATRY & NEUROLOGY

## 2025-06-25 RX ORDER — DEXTROAMPHETAMINE SACCHARATE, AMPHETAMINE ASPARTATE, DEXTROAMPHETAMINE SULFATE AND AMPHETAMINE SULFATE 2.5; 2.5; 2.5; 2.5 MG/1; MG/1; MG/1; MG/1
10 TABLET ORAL DAILY PRN
Qty: 30 TABLET | Refills: 0 | Status: SHIPPED | OUTPATIENT
Start: 2025-07-16

## 2025-06-25 RX ORDER — DEXTROAMPHETAMINE SACCHARATE, AMPHETAMINE ASPARTATE MONOHYDRATE, DEXTROAMPHETAMINE SULFATE AND AMPHETAMINE SULFATE 3.75; 3.75; 3.75; 3.75 MG/1; MG/1; MG/1; MG/1
15 CAPSULE, EXTENDED RELEASE ORAL DAILY
Qty: 30 CAPSULE | Refills: 0 | Status: SHIPPED | OUTPATIENT
Start: 2025-06-25 | End: 2025-07-25

## 2025-06-25 RX ORDER — DEXTROAMPHETAMINE SACCHARATE, AMPHETAMINE ASPARTATE, DEXTROAMPHETAMINE SULFATE AND AMPHETAMINE SULFATE 2.5; 2.5; 2.5; 2.5 MG/1; MG/1; MG/1; MG/1
10 TABLET ORAL DAILY PRN
Qty: 30 TABLET | Refills: 0 | Status: SHIPPED | OUTPATIENT
Start: 2025-08-15

## 2025-06-25 RX ORDER — DEXTROAMPHETAMINE SACCHARATE, AMPHETAMINE ASPARTATE MONOHYDRATE, DEXTROAMPHETAMINE SULFATE AND AMPHETAMINE SULFATE 3.75; 3.75; 3.75; 3.75 MG/1; MG/1; MG/1; MG/1
15 CAPSULE, EXTENDED RELEASE ORAL DAILY
Qty: 30 CAPSULE | Refills: 0 | Status: SHIPPED | OUTPATIENT
Start: 2025-08-24 | End: 2025-09-23

## 2025-06-25 RX ORDER — DEXTROAMPHETAMINE SACCHARATE, AMPHETAMINE ASPARTATE MONOHYDRATE, DEXTROAMPHETAMINE SULFATE AND AMPHETAMINE SULFATE 3.75; 3.75; 3.75; 3.75 MG/1; MG/1; MG/1; MG/1
15 CAPSULE, EXTENDED RELEASE ORAL DAILY
Qty: 30 CAPSULE | Refills: 0 | Status: SHIPPED | OUTPATIENT
Start: 2025-07-25 | End: 2025-08-24

## 2025-06-25 RX ORDER — ESCITALOPRAM OXALATE 20 MG/1
20 TABLET ORAL DAILY
Qty: 90 TABLET | Refills: 3 | Status: SHIPPED | OUTPATIENT
Start: 2025-06-25

## 2025-06-25 RX ORDER — DEXTROAMPHETAMINE SACCHARATE, AMPHETAMINE ASPARTATE, DEXTROAMPHETAMINE SULFATE AND AMPHETAMINE SULFATE 2.5; 2.5; 2.5; 2.5 MG/1; MG/1; MG/1; MG/1
10 TABLET ORAL DAILY PRN
Qty: 30 TABLET | Refills: 0 | Status: SHIPPED | OUTPATIENT
Start: 2025-09-14

## 2025-06-25 ASSESSMENT — PAIN SCALES - GENERAL: PAINLEVEL_OUTOF10: MODERATE PAIN (4)

## 2025-06-25 NOTE — PATIENT INSTRUCTIONS
Treatment Plan:  Continue Adderall XR 15 mg daily as needed for ADHD.   Continue Adderall immediate release 10 mg daily as needed for ADHD.  Continue Lexapro/escitalopram 20 mg daily  for mood and anxiety.   Continue all other cares per primary care provider.   Continue all other medications as reviewed per electronic medical record today.   Safety plan reviewed. To the Emergency Department as needed or call after hours crisis line at [phone] or [phone]. [place] Crisis Text Line. Text [place] to 921755 or Suicide LifeLine Chat: suicidepreventionlifeline.org/chat  Continue psychotherapy as needed for additional support and ongoing development of nonpharmacologic coping skills and strategies.  Schedule an appointment with me in about 6 months or sooner as needed. Call [facility] at [phone] to schedule.  Follow up with primary care provider as planned or for acute medical concerns.  Call the psychiatric nurse line with medication questions or concerns at [phone].  [employer] may be used to communicate with your provider, but this is not intended to be used for emergencies.    Have previously discussed risks of stimulant medication including, but not limited to, decreased appetite, risk of tics (and that they may be lasting), trouble sleeping, cardiac risks such as increased heart rate and blood pressure, and rare risk of sudden cardiac death.  Also risk of addiction/tolerance/dependence.    Patient Education   Collaborative Care Psychiatry Service  What to Expect  Here's what to expect from your Collaborative Care Psychiatry Service (CCPS).   About CCPS  CCPS means 2 people work together to help you get better. You'll meet with a behavioral health clinician and a psychiatric doctor. A behavioral health clinician helps people with mental health problems by talking with them. A psychiatric doctor helps people by giving them medicine.  How it works  At every visit, you'll see the behavioral  "health clinician (BHC) first. They'll talk with you about how you're doing and teach you how to feel better.   Then you'll see the psychiatric doctor. This doctor can help you deal with troubling thoughts and feelings by giving you medicine. They'll make sure you know the plan for your care.   CCPS usually takes 3 to 6 visits. If you need more visits, we may have you start seeing a different psychiatric doctor for ongoing care.  If you have any questions or concerns, we'll be glad to talk with you.  About visits  Be open  At your visits, please talk openly about your problems. It may feel hard, but it's the best way for us to help you.  Cancelling visits  If you can't come to your visit, please call us right away at [phone]. If you don't cancel at least 24 hours (1 full day) before your visit, that's \"late cancellation.\"  Being late to visits  Being very late is the same as not showing up. You will be a \"no show\" if:  Your appointment starts with a BHC, and you're more than 15 minutes late for a 30-minute (half hour) visit. This will also cancel your appointment with the psychiatric doctor.  Your appointment is with a psychiatric doctor only, and you're more than 15 minutes late for a 30-minute (half hour) visit.  Your appointment is with a psychiatric doctor only, and you're more than 30 minutes late for a 60-minute (full hour) visit.  If you cancel late or don't show up 2 times within 6 months, we may end your care.   Getting help between visits  If you need help between visits, you can call us Monday to Friday from 8 a.m. to 4:30 p.m. at [phone].  Emergency care  Call 911 or go to the nearest emergency department if your life or someone else's life is in danger.  Call 348 anytime to reach the national Suicide and Crisis hotline.  Medicine refills  To refill your medicine, call your pharmacy. You can also call [facility]'s Behavioral Access at [phone], Monday to Friday, 8 a.m. to 4:30 " p.m. It can take 1 to 3 business days to get a refill.   Forms, letters, and tests  You may have papers to fill out, like FMLA, short-term disability, and workability. We can help you with these forms at your visits, but you must have an appointment. You may need more than 1 visit for this, to be in an intensive therapy program, or both.  Before we can give you medicine for ADHD, we may refer you to get tested for it or confirm it another way.  We may not be able to give you an emotional support animal letter.  We don't do mental health checks ordered by the court.   We don't do mental health testing, but we can refer you to get tested.   Thank you for choosing us for your care.  For informational purposes only. Not to replace the advice of your health care provider. Copyright   2022 [facility]. All rights reserved. [employer] 028981 - Rev 11/24.

## 2025-06-25 NOTE — PROGRESS NOTES
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 but not limited to, decreased appetite, risk of tics (and that they may be lasting), trouble sleeping, cardiac risks such as increased heart rate and blood pressure, and rare risk of sudden cardiac death.  Also risk of addiction/tolerance/dependence.    Administrative Billing:   Phone Call/Video Duration: 23 Minutes  Start: 8:03a  Stop: 8:26a    The longitudinal plan of care for the diagnosis(es)/condition(s) as documented were addressed during this visit. Due to the added complexity in care, I will continue to support Zari in the subsequent management and with ongoing continuity of care.    Patient Status:  Patient is a long-term/continuous care patient.     Signed:   Marie Valentine DO  [facility]    Disclaimer: This note consists of symbols derived from keyboarding, dictation and/or voice recognition software. As a result, there may be errors in the script that have gone undetected. Please consider this when interpreting information found in this chart.

## 2025-06-25 NOTE — NURSING NOTE
Current patient location: [facility] [place] [address]    Is the patient currently in the state of [place]? YES    Visit mode: VIDEO    If the visit is dropped, the patient can be reconnected by:VIDEO VISIT: Text to cell phone:   Telephone Information:   Mobile [phone]       Will anyone else be joining the visit? NO  (If patient encounters technical issues they should call [phone]    Are changes needed to the allergy or medication list? Pt stated no changes to allergies and Pt stated no med changes  Patient stated they are no longer taking roPinole.    Are refills needed on medications prescribed by this physician? Discuss with provider    Rooming Documentation:  Questionnaire(s) completed    Reason for visit: RECHECK    Bebe MAYFIELD

## 2025-06-26 ENCOUNTER — PATIENT OUTREACH (OUTPATIENT)
Dept: CARE COORDINATION | Facility: CLINIC | Age: 65
End: 2025-06-26
Payer: COMMERCIAL

## 2025-06-30 ENCOUNTER — PATIENT OUTREACH (OUTPATIENT)
Dept: CARE COORDINATION | Facility: CLINIC | Age: 65
End: 2025-06-30
Payer: COMMERCIAL

## 2025-07-14 ASSESSMENT — SLEEP AND FATIGUE QUESTIONNAIRES
HOW LIKELY ARE YOU TO NOD OFF OR FALL ASLEEP IN A CAR, WHILE STOPPED FOR A FEW MINUTES IN TRAFFIC: WOULD NEVER DOZE
HOW LIKELY ARE YOU TO NOD OFF OR FALL ASLEEP WHILE SITTING AND READING: MODERATE CHANCE OF DOZING
HOW LIKELY ARE YOU TO NOD OFF OR FALL ASLEEP WHILE SITTING QUIETLY AFTER LUNCH WITHOUT ALCOHOL: SLIGHT CHANCE OF DOZING
HOW LIKELY ARE YOU TO NOD OFF OR FALL ASLEEP WHILE SITTING INACTIVE IN A PUBLIC PLACE: SLIGHT CHANCE OF DOZING
HOW LIKELY ARE YOU TO NOD OFF OR FALL ASLEEP WHILE LYING DOWN TO REST IN THE AFTERNOON WHEN CIRCUMSTANCES PERMIT: MODERATE CHANCE OF DOZING
HOW LIKELY ARE YOU TO NOD OFF OR FALL ASLEEP WHILE SITTING AND TALKING TO SOMEONE: WOULD NEVER DOZE
HOW LIKELY ARE YOU TO NOD OFF OR FALL ASLEEP WHEN YOU ARE A PASSENGER IN A CAR FOR AN HOUR WITHOUT A BREAK: SLIGHT CHANCE OF DOZING
HOW LIKELY ARE YOU TO NOD OFF OR FALL ASLEEP WHILE WATCHING TV: HIGH CHANCE OF DOZING

## 2025-07-16 ENCOUNTER — VIRTUAL VISIT (OUTPATIENT)
Dept: SLEEP MEDICINE | Facility: CLINIC | Age: 65
End: 2025-07-16
Payer: COMMERCIAL

## 2025-07-16 VITALS — WEIGHT: 157 LBS | BODY MASS INDEX: 25.23 KG/M2 | HEIGHT: 66 IN

## 2025-07-16 DIAGNOSIS — G25.81 RESTLESS LEG SYNDROME: Primary | ICD-10-CM

## 2025-07-16 ASSESSMENT — PAIN SCALES - GENERAL: PAINLEVEL_OUTOF10: MODERATE PAIN (4)
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
## 2025-07-16 NOTE — PATIENT INSTRUCTIONS
Your Body mass index is 25.34 kg/m .  Weight management is a personal decision.  If you are interested in exploring weight loss strategies, the following discussion covers the approaches that may be successful. Body mass index (BMI) is one way to tell whether you are at a healthy weight, overweight, or obese. It measures your weight in relation to your height.  A BMI of 18.5 to 24.9 is in the healthy range. A person with a BMI of 25 to 29.9 is considered overweight, and someone with a BMI of 30 or greater is considered obese. More than two-thirds of American adults are considered overweight or obese.  Being overweight or obese increases the risk for further weight gain. Excess weight may lead to heart disease and diabetes.  Creating and following plans for healthy eating and physical activity may help you improve your health.  Weight control is part of healthy lifestyle and includes exercise, emotional health, and healthy eating habits. Careful eating habits lifelong are the mainstay of weight control. Though there are significant health benefits from weight loss, long-term weight loss with diet alone may be very difficult to achieve- studies show long-term success with dietary management in less than 10% of people. Attaining a healthy weight may be especially difficult to achieve in those with severe obesity. In some cases, medications, devices and surgical management might be considered.  What can you do?  If you are overweight or obese and are interested in methods for weight loss, you should discuss this with your provider.   Consider reducing daily calorie intake by 500 calories.   Keep a food journal.   Avoiding skipping meals, consider cutting portions instead.    Diet combined with exercise helps maintain muscle while optimizing fat loss. Strength training is particularly important for building and maintaining muscle mass. Exercise helps reduce stress, increase energy, and improves fitness. Increasing  exercise without diet control, however, may not burn enough calories to loose weight.     Start walking three days a week 10-20 minutes at a time  Work towards walking thirty minutes five days a week   Eventually, increase the speed of your walking for 1-2 minutes at time    In addition, we recommend that you review healthy lifestyles and methods for weight loss available through the National Institutes of Health patient information sites:  http://win.niddk.nih.gov/publications/index.htm    And look into health and wellness programs that may be available through your health insurance provider, employer, local community center, or [employer].

## 2025-07-16 NOTE — NURSING NOTE
Current patient location: [facility] [place] [address]    Is the patient currently in the state of [place]? YES    Visit mode: VIDEO    If the visit is dropped, the patient can be reconnected by:VIDEO VISIT: Send to e-mail at: [email or web address]    Will anyone else be joining the visit? NO  (If patient encounters technical issues they should call [phone]    Are changes needed to the allergy or medication list? No    Are refills needed on medications prescribed by this physician? NO    Rooming Documentation:  Questionnaire(s) completed    Reason for visit: RECHECK    Joanne GOMEZF

## 2025-07-22 SDOH — HEALTH STABILITY: PHYSICAL HEALTH: ON AVERAGE, HOW MANY DAYS PER WEEK DO YOU ENGAGE IN MODERATE TO STRENUOUS EXERCISE (LIKE A BRISK WALK)?: 4 DAYS

## 2025-07-22 SDOH — HEALTH STABILITY: PHYSICAL HEALTH: ON AVERAGE, HOW MANY MINUTES DO YOU ENGAGE IN EXERCISE AT THIS LEVEL?: 30 MIN

## 2025-07-23 ENCOUNTER — VIRTUAL VISIT (OUTPATIENT)
Dept: PSYCHOLOGY | Facility: CLINIC | Age: 65
End: 2025-07-23
Attending: PSYCHIATRY & NEUROLOGY
Payer: COMMERCIAL

## 2025-07-23 ENCOUNTER — VIRTUAL VISIT (OUTPATIENT)
Dept: ORTHOPEDICS | Facility: CLINIC | Age: 65
End: 2025-07-23
Payer: COMMERCIAL

## 2025-07-23 DIAGNOSIS — F41.1 GAD (GENERALIZED ANXIETY DISORDER): ICD-10-CM

## 2025-07-23 DIAGNOSIS — F10.11 ALCOHOL ABUSE, IN REMISSION: ICD-10-CM

## 2025-07-23 DIAGNOSIS — M51.16 LUMBAR DISC HERNIATION WITH RADICULOPATHY: ICD-10-CM

## 2025-07-23 DIAGNOSIS — M51.362 DEGENERATION OF INTERVERTEBRAL DISC OF LUMBAR REGION WITH DISCOGENIC BACK PAIN AND LOWER EXTREMITY PAIN: Primary | ICD-10-CM

## 2025-07-23 DIAGNOSIS — M48.061 LUMBAR FORAMINAL STENOSIS: ICD-10-CM

## 2025-07-23 DIAGNOSIS — M54.16 LUMBAR RADICULAR PAIN: ICD-10-CM

## 2025-07-23 DIAGNOSIS — F33.41 RECURRENT MAJOR DEPRESSIVE DISORDER, IN PARTIAL REMISSION: ICD-10-CM

## 2025-07-23 PROCEDURE — 90791 PSYCH DIAGNOSTIC EVALUATION: CPT | Mod: 95 | Performed by: COUNSELOR

## 2025-07-23 ASSESSMENT — PATIENT HEALTH QUESTIONNAIRE - PHQ9
SUM OF ALL RESPONSES TO PHQ QUESTIONS 1-9: 4
10. IF YOU CHECKED OFF ANY PROBLEMS, HOW DIFFICULT HAVE THESE PROBLEMS MADE IT FOR YOU TO DO YOUR WORK, TAKE CARE OF THINGS AT HOME, OR GET ALONG WITH OTHER PEOPLE: SOMEWHAT DIFFICULT
SUM OF ALL RESPONSES TO PHQ QUESTIONS 1-9: 4

## 2025-07-23 ASSESSMENT — ANXIETY QUESTIONNAIRES
GAD7 TOTAL SCORE: 12
8. IF YOU CHECKED OFF ANY PROBLEMS, HOW DIFFICULT HAVE THESE MADE IT FOR YOU TO DO YOUR WORK, TAKE CARE OF THINGS AT HOME, OR GET ALONG WITH OTHER PEOPLE?: SOMEWHAT DIFFICULT
IF YOU CHECKED OFF ANY PROBLEMS ON THIS QUESTIONNAIRE, HOW DIFFICULT HAVE THESE PROBLEMS MADE IT FOR YOU TO DO YOUR WORK, TAKE CARE OF THINGS AT HOME, OR GET ALONG WITH OTHER PEOPLE: SOMEWHAT DIFFICULT
GAD7 TOTAL SCORE: 12
7. FEELING AFRAID AS IF SOMETHING AWFUL MIGHT HAPPEN: SEVERAL DAYS
4. TROUBLE RELAXING: MORE THAN HALF THE DAYS
6. BECOMING EASILY ANNOYED OR IRRITABLE: MORE THAN HALF THE DAYS
1. FEELING NERVOUS, ANXIOUS, OR ON EDGE: MORE THAN HALF THE DAYS
GAD7 TOTAL SCORE: 12
2. NOT BEING ABLE TO STOP OR CONTROL WORRYING: MORE THAN HALF THE DAYS
5. BEING SO RESTLESS THAT IT IS HARD TO SIT STILL: MORE THAN HALF THE DAYS
3. WORRYING TOO MUCH ABOUT DIFFERENT THINGS: SEVERAL DAYS
7. FEELING AFRAID AS IF SOMETHING AWFUL MIGHT HAPPEN: SEVERAL DAYS

## 2025-07-23 NOTE — PROGRESS NOTES
"    [facility]         PATIENT'S NAME: Zari Francisco  PREFERRED NAME: Zari  PRONOUNS: she/her/hers     MRN: [id number]  : 1960  ADDRESS: [address] [place] [address]  [facility]  [id number]  DATE OF SERVICE: 25  START TIME: 1300  END TIME: 1400  PREFERRED PHONE: [phone]  May we leave a program related message: Yes  EMERGENCY CONTACT: was obtained .  SERVICE MODALITY:  Video Visit:      Provider verified identity through the following two step process.  Patient provided:  Patient  and Patient address    Telemedicine Visit: The patient's condition can be safely assessed and treated via synchronous audio and visual telemedicine encounter.      Reason for Telemedicine Visit: Services only offered telehealth    Originating Site (Patient Location): Patient's home    Distant Site (Provider Location): Provider Remote Setting- Home Office    Consent:  The patient/guardian has verbally consented to: the potential risks and benefits of telemedicine (video visit) versus in person care; bill my insurance or make self-payment for services provided; and responsibility for payment of non-covered services.     Patient would like the video invitation sent by:  My Chart    Mode of Communication:  Video Conference via 1-800-DOCTORS    Distant Location (Provider):  Off-site    As the provider I attest to compliance with applicable laws and regulations related to telemedicine.    UNIVERSAL ADULT Mental Health DIAGNOSTIC ASSESSMENT    Identifying Information:  Patient is a 65 year old,   individual.  Patient was referred for an assessment by self.  Patient attended the session alone.    Chief Complaint:   The reason for seeking services at this time is: \"Managing my moods\".  The problem(s) began 25.    Patient has attempted to resolve these concerns in the past through DBT programming.    Social/Family History:  Patient reported they grew up in [place], [place].  They were raised by " "biological parents  .  Parents were always together.  Patient reported that their childhood was .  Patient described their current relationships with family of origin as \"close with sons and grandchildren\".     The patient describes their cultural background as .  Cultural influences and impact on patient's life structure, values, norms, and healthcare: Rural and I grew up Pentec[employer]al.  Contextual influences on patient's health include: Individual Factors financial stress.    These factors will be addressed in the Preliminary Treatment plan. Patient identified their preferred language to be English. Patient reported they does not need the assistance of an  or other support involved in therapy.     Patient reported had no significant delays in developmental tasks.   Patient's highest education level was associate degree / vocational certificate  .  Patient identified the following learning problems: none reported.  Modifications will not be used to assist communication in therapy.  Patient reports they are  able to understand written materials.    Patient reported the following relationship history:  Patient has been  for over 40 years.  Patient's current relationship status is .   Patient identified their sexual orientation as heterosexual.  Patient reported having 2 child(yenifer). Patient identified partner; mother; adult child; other as part of their support system.  Patient identified the quality of these relationships as stable and meaningful,  .      Patient's current living/housing situation involves staying in own home/apartment.  The immediate members of family and household include Luis, 68,  and they report that housing is stable.    Patient is currently employed part time.  Patient reports their finances are obtained through employment; spouse. Patient does identify finances as a current stressor.      Patient reported that they have not been involved with the legal " system.   Patient does not report being under probation/ parole/ jurisdiction. They are not under any current court jurisdiction. .    Patient's Strengths and Limitations:  Patient identified the following strengths or resources that will help them succeed in treatment: commitment to health and well being, family support, insight, and intelligence. Things that may interfere with the patient's success in treatment include: none identified.     Assessments:  The following assessments were completed by patient for this visit:    PHQ9:       2/17/2023     6:45 AM 6/7/2023    10:59 AM 7/13/2023     5:56 AM 1/18/2024     4:46 PM 5/9/2024     7:28 AM 6/20/2025     1:56 PM 7/23/2025    11:04 AM   PHQ-9 SCORE   PHQ-9 Total Score MyChart 3 (Minimal depression) 3 (Minimal depression) 4 (Minimal depression) 0 1 (Minimal depression)  4 (Minimal depression)   PHQ-9 Total Score 3 3 4 0 1 4 4        Patient-reported       GAD7:       12/20/2011     3:36 PM 11/8/2013     4:50 PM 2/25/2022     7:32 AM 9/29/2022     7:49 AM 11/9/2022    10:12 AM 6/5/2023     3:32 PM 7/23/2025    11:23 AM   PUSHPA-7 SCORE   Total Score 5 8        Total Score   6 (mild anxiety)  9 (mild anxiety) 9 (mild anxiety) 12 (moderate anxiety)   Total Score   6 7 9 9    9 12        Patient-reported     CAGE-AID:       1/12/2022    12:44 PM 7/23/2025    11:24 AM   CAGE-AID Total Score   Total Score 0    0 0    Total Score MyChart 0 (A total score of 2 or greater is considered clinically significant) 0 (A total score of 2 or greater is considered clinically significant)       Patient-reported     PROMIS 10-Global Health (only subscores and total score):       10/6/2023    10:23 AM 1/15/2024     4:32 PM 5/6/2024     8:19 AM 10/30/2024     9:17 AM 3/23/2025     8:11 PM 6/20/2025     5:42 PM 7/23/2025    11:23 AM   PROMIS-10 Scores Only   Global Mental Health Score 14 18    18 19    19 17 16  16 13    Global Physical Health Score 19 16    16 16    16 17 16  17 16     PROMIS TOTAL - SUBSCORES 33 34    34 35    35 34 32  33 29        Patient-reported     [place] Suicide Severity Rating Scale (Lifetime/Recent)      1/17/2022     9:44 AM 1/19/2024     7:49 AM 7/23/2025     1:26 PM   [place] Suicide Severity Rating (Lifetime/Recent)   Q1 Wish to be Dead (Lifetime) No      Q2 Non-Specific Active Suicidal Thoughts (Lifetime) No      RETIRED: 1. Wish to be Dead (Recent) No      RETIRED: 2. Non-Specific Active Suicidal Thoughts (Recent) No     3. Active Suicidal Ideation with any Methods (Not Plan) Without Intent to Act (Lifetime) No     RETIRED: 3. Active Suicidal Ideation with any Methods (Not Plan) Without Intent to Act (Recent) No      RETIRE: 4. Active Suicidal Ideation with Some Intent to Act, Without Specific Plan (Lifetime) No     4. Active Suicidal Ideation with Some Intent to Act, Without Specific Plan (Recent) No     RETIRE: 5. Active Suicidal Ideation with Specific Plan and Intent (Lifetime) No     RETIRED: 5. Active Suicidal Ideation with Specific Plan and Intent (Recent) No      Actual Attempt (Lifetime) No     Actual Attempt (Past 3 Months) No     Has subject engaged in non-suicidal self-injurious behavior? (Lifetime) No     Has subject engaged in non-suicidal self-injurious behavior? (Past 3 Months) No     1. Wish to be Dead (Lifetime)  N N   1. Wish to be Dead (Past 1 Month)   N   2. Non-Specific Active Suicidal Thoughts (Lifetime)  N N   Actual Attempt (Lifetime)  N N   Has subject engaged in non-suicidal self-injurious behavior? (Lifetime)  N N   Interrupted Attempts (Lifetime)  N N   Aborted or Self-Interrupted Attempt (Lifetime)  N N   Preparatory Acts or Behavior (Lifetime)  N N   Calculated C-SSRS Risk Score (Lifetime/Recent)  No Risk Indicated No Risk Indicated       Data saved with a previous flowsheet row definition       Personal and Family Medical History:  Patient does report a family history of mental health concerns.  Patient reports family history  includes Alcohol/Drug in an other family member; Allergies in her mother; Arthritis in her mother and sister; Breast Cancer in her maternal grandmother; Cancer in her father, maternal aunt, and maternal grandmother; Heart Disease in her paternal grandfather; Hypertension in her mother; Lipids in her father; Obesity in her mother; Other Cancer in her father..     Patient does report Mental Health Diagnosis and/or Treatment.  Patient reported the following previous diagnoses which include(s): ADHD; an anxiety disorder .  Patient reported symptoms began about one year ago.  Patient has received mental health services in the past:  therapy  .  Psychiatric Hospitalizations: none Patient denies a history of civil commitment.      Currently, patient is receiving other mental health services.  These include psychiatry with Dr Valentine.  Next appointment: TBS.       Patient has had a physical exam to rule out medical causes for current symptoms.  Date of last physical exam was within the past year. Client was encouraged to follow up with PCP if symptoms were to develop. The patient has a [facility] Provider, who is named Susanna Dyer.  Patient reports the following current medical concerns:  .  Patient reports pain concerns including back pain.  Patient does not want help addressing pain concerns..   There are not significant appetite / nutritional concerns / weight changes.   Patient does not report a history of head injury / trauma / cognitive impairment.      Patient reports current meds as:   Current Outpatient Medications   Medication Sig Dispense Refill    amphetamine-dextroamphetamine (ADDERALL XR) 15 MG 24 hr capsule Take 1 capsule (15 mg) by mouth daily. 30 capsule 0    [START ON 7/25/2025] amphetamine-dextroamphetamine (ADDERALL XR) 15 MG 24 hr capsule Take 1 capsule (15 mg) by mouth daily. 30 capsule 0    [START ON 8/24/2025] amphetamine-dextroamphetamine (ADDERALL XR) 15 MG 24 hr capsule Take 1  capsule (15 mg) by mouth daily. 30 capsule 0    amphetamine-dextroamphetamine (ADDERALL) 10 MG tablet Take 1 tablet (10 mg) by mouth daily as needed (ADHD). 30 tablet 0    [START ON 8/15/2025] amphetamine-dextroamphetamine (ADDERALL) 10 MG tablet Take 1 tablet (10 mg) by mouth daily as needed (ADHD). 30 tablet 0    [START ON 9/14/2025] amphetamine-dextroamphetamine (ADDERALL) 10 MG tablet Take 1 tablet (10 mg) by mouth daily as needed (ADHD). 30 tablet 0    conjugated estrogens (PREMARIN) 0.625 MG/GM vaginal cream INSERT 0.5 GRAM INTRAVAGINALLY 2 TIMES A WEEK AS DIRECTED 30 g 3    escitalopram (LEXAPRO) 20 MG tablet Take 1 tablet (20 mg) by mouth daily. 90 tablet 3    etodolac (LODINE CR) 500 MG 24 hr tablet Take 1 tablet (500 mg) by mouth daily. 60 tablet 1    gabapentin (NEURONTIN) 100 MG capsule Take 1 capsule (100 mg) by mouth 2 times daily. 60 capsule 3    gabapentin (NEURONTIN) 300 MG capsule Take 300mg at 1:30pm and 300mg at 4pm and 900mg at bedtime 150 capsule 2    methocarbamol (ROBAXIN) 500 MG tablet Take 1 tablet (500 mg) by mouth 3 times daily as needed for muscle spasms. 60 tablet 1    nicotine (COMMIT) 2 MG lozenge Place 1 lozenge (2 mg) inside cheek every hour as needed for nicotine withdrawal symptoms. 168 lozenge 1    nicotine (NICODERM CQ) 7 MG/24HR 24 hr patch Place 1 patch over 24 hours onto the skin every 24 hours. 30 patch 2    Omega-3 Fatty Acids (FISH OIL) 1200 MG capsule Take 2,400 mg by mouth daily       No current facility-administered medications for this visit.     Facility-Administered Medications Ordered in Other Visits   Medication Dose Route Frequency Provider Last Rate Last Admin    iopamidol (ISOVUE-M 200) solution 10 mL  10 mL EPIDURAL Once Jacky Sweeney MD           Medication Adherence:  Patient reports taking.  taking psychiatric medications as prescribed.    Patient Allergies:    Allergies   Allergen Reactions    Metal [Staples]     Sudafed [Pseudoephedrine Hcl]      Sympathomimetics      Clariten D    Contrast Dye Itching     Isovue 370-CT contrast. Very small area of itchiness after contrast injection       Medical History:    Past Medical History:   Diagnosis Date    Anxiety     Bell palsy     Cervical high risk HPV (human papillomavirus) test positive 05/06/2019    See problem list    DDD (degenerative disc disease), lumbar 01/05/2023    S/P MARYLIN     still has cervix    Seasonal allergies          Current Mental Status Exam:   Appearance:  Appropriate    Eye Contact:  Good   Psychomotor:  Normal       Gait / station:  no problem  Attitude / Demeanor: Cooperative  Interested  Speech      Rate / Production: Normal/ Responsive      Volume:  Normal  volume      Language:  intact and no problems  Mood:   Normal Sad   Affect:   Appropriate    Thought Content: Clear   Thought Process: Logical       Associations: No loosening of associations  Insight:   Good   Judgment:  Intact   Orientation:  All  Attention/concentration: Good    Substance Use:   Patient did not report a family history of substance use concerns; see medical history section for details.  Patient has received chemical dependency treatment in the past at [facility] for [facility].  Patient has ever been to detox.      Patient is not currently receiving any chemical dependency treatment.           Substance History of use Age of first use Date of last use     Pattern and duration of use (include amounts and frequency)   Alcohol used in the past   15 05/10/91 REPORTS SUBSTANCE USE: N/A   Cannabis   currently use 15 07/20/25 REPORTS SUBSTANCE USE: reports using substance 4 times per month and has 3 hits at a time.   Patient reports heaviest use is current use.     Amphetamines   never used        Cocaine/crack    used in the past 22  05/10/91  REPORTS SUBSTANCE USE: N/A   Hallucinogens never used            Inhalants never used            Heroin never used            Other Opiates never used        Benzodiazepine   never  used        Barbiturates never used        Over the counter meds never used        Caffeine currently use 15      Nicotine  currently use 15 07/23/25 REPORTS SUBSTANCE USE: N/A   Other substances not listed above:  Identify:  never used          Patient reported the following problems as a result of their substance use: no problems, not applicable.      Substance Use: No symptoms    Based on the CAGE score of 0 and clinical interview there  are not indications of drug or alcohol abuse.    Significant Losses / Trauma / Abuse / Neglect Issues:   Patient did not  serve in the .  There are not indications or report of significant loss, trauma, abuse or neglect issues related to: are no indications and client denies any losses, trauma, abuse, or neglect concerns.  Patient has not been a victim of exploitation.  Concerns for possible neglect are not present.     Safety Assessment:   Patient denies current or past homicidal ideation and behaviors.  Patient denies current or past suicidal ideation and behaviors.  Patient denies current or past self-injurious behaviors.  Patient denied risk behaviors associated with substance use.  Patient denies any high risk behaviors associated with mental health symptoms.  Patient denied current or past personal safety concerns.    Patient denies past of current/recent assaultive behaviors.    Patient denied a history of sexual assault behaviors.     Patient reports there  are, yes, they are secured. firearms in the house.    Patient reports the following protective factors:  forward or future oriented thinking; dedication to family or friends; safe and stable environment; regular sleep; regular physical activity; sense of belonging; purpose; secure attachment; help seeking behaviors when distressed; abstinence from substances; adherence with prescribed medication; living with other people; daily obligations; commitment to well being; sense of meaning; positive social skills;  healthy fear of risky behaviors or pain; financial stability; strong sense of self worth or esteem; sense of personal control or determination; access to a variety of clinical interventions and pets    Risk Plan:  See Recommendations for Safety and Risk Management Plan    Review of Symptoms per patient report:   Depression: Lack of interest or pleasure in doing things, Feeling sad, down, or depressed, Feelings of hopelessness, Change in energy level, Change in sleep, Difficulties concentrating, Ruminations, Irritability, and Withdrawn  Sharmila:  No Symptoms  Psychosis: No Symptoms  Anxiety: Excessive worry, Nervousness, Sleep disturbance, Ruminations, Poor concentration, Irritability, and Anger outbursts  Panic:  Sense of impending doom  Post Traumatic Stress Disorder:  No Symptoms   Eating Disorder: No Symptoms  ADD / ADHD:  No symptoms  Conduct Disorder: No symptoms  Autism Spectrum Disorder: No symptoms  Obsessive Compulsive Disorder: No Symptoms  Personality Disorders:  No symptoms    Patient reports the following compulsive behaviors and treatment history: None.      Diagnostic Criteria:   Generalized Anxiety Disorder  A. Excessive anxiety and worry about a number of events or activities (such as work or school performance).   B. The person finds it difficult to control the worry.  C. Select 3 or more symptoms (required for diagnosis). Only one item is required in children.   - Restlessness or feeling keyed up or on edge.    - Being easily fatigued.    - Difficulty concentrating or mind going blank.    - Irritability.    - Sleep disturbance (difficulty falling or staying asleep, or restless unsatisfying sleep).   D. The focus of the anxiety and worry is not confined to features of an Axis I disorder.  E. The anxiety, worry, or physical symptoms cause clinically significant distress or impairment in social, occupational, or other important areas of functioning.   F. The disturbance is not due to the direct  physiological effects of a substance (e.g., a drug of abuse, a medication) or a general medical condition (e.g., hyperthyroidism) and does not occur exclusively during a Mood Disorder, a Psychotic Disorder, or a Pervasive Developmental Disorder. Major Depressive Disorder  CRITERIA (A-C) REPRESENT A MAJOR DEPRESSIVE EPISODE - SELECT THESE CRITERIA  A) Recurrent episode(s) - symptoms have been present during the same 2-week period and represent a change from previous functioning 5 or more symptoms (required for diagnosis)   - Depressed mood. Note: In children and adolescents, can be irritable mood.     - Diminished interest or pleasure in all, or almost all, activities.    - Fatigue or loss of energy.    - Feelings of worthlessness or inappropriate guilt.    - Diminished ability to think or concentrate, or indecisiveness.   B) The symptoms cause clinically significant distress or impairment in social, occupational, or other important areas of functioning  C) The episode is not attributable to the physiological effects of a substance or to another medical condition  D) The occurence of major depressive episode is not better explained by other thought / psychotic disorders  E) There has never been a manic episode or hypomanic episode    Functional Status:  Patient reports the following functional impairments:  management of the household and or completion of tasks, money management, organization, relationship(s), self-care, and work / vocational responsibilities.     Nonprogrammatic care:  Patient is requesting basic services to address current mental health concerns.    Clinical Summary:  1. Psychosocial Factors:  Substance use history/concerns, Financial strain.  Cultural and Contextual Factors: None identified  2. Principal DSM5 Diagnoses  (Sustained by DSM5 Criteria Listed Above):   300.02 (F41.1) Generalized Anxiety Disorder.  3. Other Diagnoses that is relevant to services:   296.31 (F33.0) Major Depressive  Disorder, Recurrent Episode, Mild _.  4. Provisional Diagnosis:  Further diagnosis clarification may be beneficial.  5. Prognosis: Expect Improvement.  6. Likely consequences of symptoms if not treated: higher level of care.  7. Patient strengths include:  support of family, friends and providers, supportive, wants to learn, willing to ask questions, willing to relate to others, and work history .     Recommendations:     1. Plan for Safety and Risk Management:   Safety and Risk: Recommended that patient call 911 or go to the local ED should there be a change in any of these risk factors        Report to child / adult protection services was NA.     2. Patient's identified none identified.     3. Initial Treatment will focus on:    Depressed Mood -    Anxiety -  .     4. Resources/Service Plan:    services are not indicated.   Modifications to assist communication are not indicated.   Additional disability accommodations are not indicated.      5. Collaboration:   Collaboration / coordination of treatment will be initiated with the following  support professionals: psychiatry.      6.  Referrals:   The following referral(s) will be initiated: Outpatient Mental [facility].       A Release of Information has been obtained for the following: None at this time.     Clinical Substantiation/medical necessity for the above recommendations:    Patient is a 65 year old who presents with mood concerns related to financial stress.  Patient reports history of Anxiety.  Patient has historically been able to manage symptoms through DBT programming and medications.     Patients acute suicide risk was determined to be  minimal due to the following factors: Denial of suicidal thoughts, no history of suicide attempts. Patient denies current thoughts of suicidal ideation and self harm and reports ability to keep self safe.     Patient is not currently under the influence of alcohol or illicit substances, denies  experiencing command hallucinations, and has no immediate access to firearms. Protective factors include: Patient reports the following protective factors: dedication to family/friends, safe and stable environment, daily obligations, committment to well-being, sense of personal control or determination and access to a variety of clinical interventions    Patient denies current substance use concerns and reports ability to maintain safety when using substances.    Patient would benefit from more extensive mental health support such as individual therapy weekly to help mitigate risk of hospitalization or suicidality. Patient is in agreement with plan.      7. MANOLO:    MANOLO:  Discussed the general effects of drugs and alcohol on health and well-being. Provider gave patient printed information about the  effects of chemical use on their health and well being. Recommendations:  to abstain from all mood altering substances .     8. Records:   These were reviewed at time of assessment.   Information in this assessment was obtained from the medical record and  provided by patient who is a good historian.    Patient will have open access to their mental health medical record.    9.   Interactive Complexity: No    10. Safety Plan: No Safety plan indicated    Provider Name/ Credentials:  Venessa Villalobos [facility]  July 23, 2025

## 2025-07-23 NOTE — PROGRESS NOTES
Zari is a 65 year old who is being evaluated via a billable telephone visit.    What phone number would you like to be contacted at? listed  How would you like to obtain your AVS? Kristi  Originating Location (pt. Location): Home    Distant Location (provider location):  On-site  Telephone visit completed due to the patient did not consent to a video visit.  Phone call duration: 20 minutes  Patient is a  65   year old who is being evaluated via a billable telephone visit.      What phone number would you like to be contacted at? CELL  How would you like to obtain your AVS? KRISTI        Subjective   Patient is a  65   year old who presents by phone call visit for the following:     HPI   Followup for low back pain and sciatica  Has felt much better since lumbar YENNIFER in May  She has had 3 months of over 75% improvement in low back pain  She still has her episodes of pain and sciatica, but they are much more tolerable and she is content with her symptoms and plan at this point  She is using etodolac as prescribed PRN which also helps  She is doing PT and HEP        Review of Systems   Constitutional, HEENT, cardiovascular, pulmonary, gi and gu systems are negative, except as otherwise noted.      Objective           Vitals:  No vitals were obtained today due to virtual visit.    Physical Exam   healthy, alert, and no distress  PSYCH: Alert and oriented times 3; coherent speech, normal   rate and volume, able to articulate logical thoughts, able   to abstract reason, no tangential thoughts, no hallucinations   or delusions  His affect is normal  RESP: No cough, no audible wheezing, able to talk in full sentences  Remainder of exam unable to be completed due to telephone visits    Assessment/Plan  66 yo female with lumbar radicular pain, chronic, lumbar ddd, disc herniations, improved, not resolved    I independently reviewed the following imaging studies and discussed with patient:  Lumbar MRI shows ddd, disc  herniations, facet arthropathy  Cont. Lodine PRN  Cont. HEP  Will follow up in 2 months  Sooner if condition worsens  Has appointment for discussion about possible future surgical options for lumbar spine with Dr Olivera next month          Phone call duration: 20 minutes  Phone call start: 355pm  Phone call end: 415pm  Dr Sweeney

## 2025-07-23 NOTE — LETTER
7/23/2025      Zari Francisco  [address]  [place] [address]      Dear Colleague,    Thank you for referring your patient, Zari Francisco, to the [facility] [place]. Please see a copy of my visit note below.    Zari is a 65 year old who is being evaluated via a billable telephone visit.    What phone number would you like to be contacted at? listed  How would you like to obtain your AVS? Kristi  Originating Location (pt. Location): Home  {PROVIDER LOCATION On-site should be selected for visits conducted from your clinic location or adjoining [facility] hospital, academic office, or other nearby [facility] building. Off-site should be selected for all other provider locations, including home:[id number]}  Distant Location (provider location):  On-site  Telephone visit completed due to the patient did not consent to a video visit.  Phone call duration: 20 minutes  Patient is a  65   year old who is being evaluated via a billable telephone visit.      What phone number would you like to be contacted at? CELL  How would you like to obtain your AVS? KRISTI        Subjective   Patient is a  65   year old who presents by phone call visit for the following:     HPI   Followup for low back pain and sciatica  Has felt much better since lumbar YENNIFER in May  She has had 3 months of over 75% improvement in low back pain  She still has her episodes of pain and sciatica, but they are much more tolerable and she is content with her symptoms and plan at this point  She is using etodolac as prescribed PRN which also helps  She is doing PT and HEP        Review of Systems   Constitutional, HEENT, cardiovascular, pulmonary, gi and gu systems are negative, except as otherwise noted.      Objective           Vitals:  No vitals were obtained today due to virtual visit.    Physical Exam   healthy, alert, and no distress  PSYCH: Alert and oriented times 3; coherent speech, normal   rate and volume, able to articulate logical  thoughts, able   to abstract reason, no tangential thoughts, no hallucinations   or delusions  His affect is normal  RESP: No cough, no audible wheezing, able to talk in full sentences  Remainder of exam unable to be completed due to telephone visits    Assessment/Plan  64 yo female with lumbar radicular pain, chronic, lumbar ddd, disc herniations, improved, not resolved    I independently reviewed the following imaging studies and discussed with patient:  Lumbar MRI shows ddd, disc herniations, facet arthropathy  Cont. Lodine PRN  Cont. HEP  Will follow up in 2 months  Sooner if condition worsens  Has appointment for discussion about possible future surgical options for lumbar spine with Dr Olivera next month          Phone call duration: 20 minutes  Phone call start: 355pm  Phone call end: 415pm  Dr Sweeney     Again, thank you for allowing me to participate in the care of your patient.        Sincerely,        Jacky Sweeney MD    Electronically signed

## 2025-07-23 NOTE — LETTER
7/23/2025      Zari Francisco  [address]  [place] [address]      Dear Colleague,    Thank you for referring your patient, Zari Francisco, to the [facility] [place]. Please see a copy of my visit note below.    Zari is a 65 year old who is being evaluated via a billable telephone visit.    What phone number would you like to be contacted at? listed  How would you like to obtain your AVS? Kristi  Originating Location (pt. Location): Home  {PROVIDER LOCATION On-site should be selected for visits conducted from your clinic location or adjoining [facility] hospital, academic office, or other nearby [facility] building. Off-site should be selected for all other provider locations, including home:[id number]}  Distant Location (provider location):  On-site  Telephone visit completed due to the patient did not consent to a video visit.  Phone call duration: 20 minutes  Patient is a  65   year old who is being evaluated via a billable telephone visit.      What phone number would you like to be contacted at? CELL  How would you like to obtain your AVS? KRISTI        Subjective   Patient is a  65   year old who presents by phone call visit for the following:     HPI   Followup for low back pain and sciatica  Has felt much better since lumbar YENNIFER in May  She has had 3 months of over 75% improvement in low back pain  She still has her episodes of pain and sciatica, but they are much more tolerable and she is content with her symptoms and plan at this point  She is using etodolac as prescribed PRN which also helps  She is doing PT and HEP        Review of Systems   Constitutional, HEENT, cardiovascular, pulmonary, gi and gu systems are negative, except as otherwise noted.      Objective           Vitals:  No vitals were obtained today due to virtual visit.    Physical Exam   healthy, alert, and no distress  PSYCH: Alert and oriented times 3; coherent speech, normal   rate and volume, able to articulate logical  thoughts, able   to abstract reason, no tangential thoughts, no hallucinations   or delusions  His affect is normal  RESP: No cough, no audible wheezing, able to talk in full sentences  Remainder of exam unable to be completed due to telephone visits    Assessment/Plan  66 yo female with lumbar radicular pain, chronic, lumbar ddd, disc herniations, improved, not resolved    I independently reviewed the following imaging studies and discussed with patient:  Lumbar MRI shows ddd, disc herniations, facet arthropathy  Cont. Lodine PRN  Cont. HEP  Will follow up in 2 months  Sooner if condition worsens  Has appointment for discussion about possible future surgical options for lumbar spine with Dr Olivera next month          Phone call duration: 20 minutes  Phone call start: 355pm  Phone call end: 415pm  Dr Sweeney     Again, thank you for allowing me to participate in the care of your patient.        Sincerely,        Jacky Sweeney MD    Electronically signed

## 2025-07-31 ENCOUNTER — TELEPHONE (OUTPATIENT)
Dept: NEUROSURGERY | Facility: CLINIC | Age: 65
End: 2025-07-31
Payer: COMMERCIAL

## 2025-07-31 NOTE — TELEPHONE ENCOUNTER
Left Voicemail (1st Attempt) for the patient to call back and schedule the following:    Appointment type: New Lumbar Spine  Provider: Dr. Olivera  Return date: 8/15/2025 at 3 p.m.  Specialty phone number: [phone]  Additional appointment(s) needed: yes  Additonal Notes: xrays prior same day    Attempted to reschedule the appointment with Dr. Olivera, patient has a virtual visit prior to Dr. Olivera that will conflict with xrays and appointment with Dr. Olivera.    Reschedule to 3 p.m. with xrays prior in [place] on 8/15/2025. Ok to use the return spot.    [facility] PRUITT/Complex Procedure    [facility]   Neurology, NeuroSurgery, NeuroPsychology, Pain Management and Cardiology Specialties  Medical/Surgical Adult Specialties

## 2025-08-11 ENCOUNTER — ANCILLARY PROCEDURE (OUTPATIENT)
Dept: CT IMAGING | Facility: CLINIC | Age: 65
End: 2025-08-11
Attending: FAMILY MEDICINE
Payer: COMMERCIAL

## 2025-08-11 ENCOUNTER — ANCILLARY PROCEDURE (OUTPATIENT)
Dept: GENERAL RADIOLOGY | Facility: CLINIC | Age: 65
End: 2025-08-11
Attending: ORTHOPAEDIC SURGERY
Payer: COMMERCIAL

## 2025-08-11 DIAGNOSIS — R91.1 NODULE OF LEFT LUNG: ICD-10-CM

## 2025-08-11 DIAGNOSIS — M51.16 LUMBAR DISC HERNIATION WITH RADICULOPATHY: ICD-10-CM

## 2025-08-11 PROCEDURE — 71250 CT THORAX DX C-: CPT | Performed by: RADIOLOGY

## 2025-08-15 ENCOUNTER — VIRTUAL VISIT (OUTPATIENT)
Dept: PSYCHOLOGY | Facility: CLINIC | Age: 65
End: 2025-08-15
Payer: COMMERCIAL

## 2025-08-15 DIAGNOSIS — F41.1 GAD (GENERALIZED ANXIETY DISORDER): Primary | ICD-10-CM

## 2025-08-15 PROCEDURE — 90837 PSYTX W PT 60 MINUTES: CPT | Mod: 95 | Performed by: COUNSELOR

## 2025-08-16 ENCOUNTER — MYC MEDICAL ADVICE (OUTPATIENT)
Dept: SLEEP MEDICINE | Facility: CLINIC | Age: 65
End: 2025-08-16
Payer: COMMERCIAL

## 2025-08-18 ENCOUNTER — VIRTUAL VISIT (OUTPATIENT)
Dept: PHARMACY | Facility: CLINIC | Age: 65
End: 2025-08-18

## 2025-08-18 ENCOUNTER — TELEPHONE (OUTPATIENT)
Dept: SLEEP MEDICINE | Facility: CLINIC | Age: 65
End: 2025-08-18
Payer: COMMERCIAL

## 2025-08-18 DIAGNOSIS — F17.200 TOBACCO USE DISORDER: ICD-10-CM

## 2025-08-18 DIAGNOSIS — G25.81 RESTLESS LEG SYNDROME: Primary | ICD-10-CM

## 2025-08-18 PROCEDURE — 99207 PR NO CHARGE LOS: CPT | Mod: 95 | Performed by: PHARMACIST

## 2025-08-18 RX ORDER — NICOTINE 21 MG/24HR
PATCH, TRANSDERMAL 24 HOURS TRANSDERMAL EVERY 24 HOURS
Qty: 30 PATCH | Refills: 1 | Status: SHIPPED | OUTPATIENT
Start: 2025-08-18

## 2025-08-19 ENCOUNTER — TELEPHONE (OUTPATIENT)
Dept: FAMILY MEDICINE | Facility: CLINIC | Age: 65
End: 2025-08-19
Payer: COMMERCIAL

## 2025-08-19 DIAGNOSIS — F17.200 TOBACCO USE DISORDER: ICD-10-CM

## 2025-09-05 ENCOUNTER — PRE VISIT (OUTPATIENT)
Dept: NEUROSURGERY | Facility: CLINIC | Age: 65
End: 2025-09-05

## (undated) DEVICE — KIT ENDO FIRST STEP DISINFECTANT 200ML W/POUCH EP-4

## (undated) DEVICE — PREP CHLORAPREP 26ML TINTED ORANGE  260815

## (undated) DEVICE — PAD CHUX UNDERPAD 23X24" 7136

## (undated) RX ORDER — LIDOCAINE HYDROCHLORIDE 10 MG/ML
INJECTION, SOLUTION EPIDURAL; INFILTRATION; INTRACAUDAL; PERINEURAL
Status: DISPENSED
Start: 2025-01-21

## (undated) RX ORDER — BETAMETHASONE SODIUM PHOSPHATE AND BETAMETHASONE ACETATE 3; 3 MG/ML; MG/ML
INJECTION, SUSPENSION INTRA-ARTICULAR; INTRALESIONAL; INTRAMUSCULAR; SOFT TISSUE
Status: DISPENSED
Start: 2024-03-04

## (undated) RX ORDER — DEXAMETHASONE SODIUM PHOSPHATE 10 MG/ML
INJECTION, SOLUTION INTRAMUSCULAR; INTRAVENOUS
Status: DISPENSED
Start: 2025-01-21

## (undated) RX ORDER — DEXAMETHASONE SODIUM PHOSPHATE 10 MG/ML
INJECTION, SOLUTION INTRAMUSCULAR; INTRAVENOUS
Status: DISPENSED
Start: 2024-06-06

## (undated) RX ORDER — LIDOCAINE HYDROCHLORIDE 10 MG/ML
INJECTION, SOLUTION EPIDURAL; INFILTRATION; INTRACAUDAL; PERINEURAL
Status: DISPENSED
Start: 2024-03-04

## (undated) RX ORDER — LIDOCAINE HYDROCHLORIDE 10 MG/ML
INJECTION, SOLUTION EPIDURAL; INFILTRATION; INTRACAUDAL; PERINEURAL
Status: DISPENSED
Start: 2024-06-06